# Patient Record
Sex: FEMALE | Race: WHITE | Employment: OTHER | RURAL
[De-identification: names, ages, dates, MRNs, and addresses within clinical notes are randomized per-mention and may not be internally consistent; named-entity substitution may affect disease eponyms.]

---

## 2017-02-01 ENCOUNTER — TELEPHONE (OUTPATIENT)
Dept: FAMILY MEDICINE CLINIC | Age: 77
End: 2017-02-01

## 2017-02-01 NOTE — TELEPHONE ENCOUNTER
Pt has decided to go to Urgent care in [address] [facility] for signs of UTI. She states it is closer for her anyway.

## 2017-02-03 ENCOUNTER — TELEPHONE (OUTPATIENT)
Dept: FAMILY MEDICINE CLINIC | Age: 77
End: 2017-02-03

## 2017-02-03 NOTE — TELEPHONE ENCOUNTER
UTI.  WENT TO URGENT CARE WHEN WE COULDN'T GET HER IN.  NOW SHE IS WORSE. WANTED TO COME FOR A VISIT.   [phone]

## 2017-02-03 NOTE — TELEPHONE ENCOUNTER
HEMALATHA SW patient and she stated that she was diagnosed with an UTI on Wednesday / Urgent Care and was started on Cipro. She had to stop taking the medication last night because it was making her feel so sick, but now is having back pain and a temp this morning of 99.9. I stated to her that the UTI probably has not cleared up since she did not complete the treatment and that John Gautam might have her to come in to recheck her urine or give her another medication, but will send the message to John Gautam.

## 2017-02-03 NOTE — TELEPHONE ENCOUNTER
Returned patient call    She states she is continuing with the cipro she is cutting the pill in half and taking about an hour apart. She has a week prescription. Advised her to continue with current prescription  Increase fluid intake    Call next week if symptoms do not improve.

## 2017-02-13 ENCOUNTER — OFFICE VISIT (OUTPATIENT)
Dept: FAMILY MEDICINE CLINIC | Age: 77
End: 2017-02-13

## 2017-02-13 VITALS
DIASTOLIC BLOOD PRESSURE: 80 MMHG | HEART RATE: 56 BPM | OXYGEN SATURATION: 97 % | TEMPERATURE: 97.6 F | RESPIRATION RATE: 20 BRPM | HEIGHT: 65 IN | SYSTOLIC BLOOD PRESSURE: 160 MMHG | WEIGHT: 191.2 LBS | BODY MASS INDEX: 31.86 KG/M2

## 2017-02-13 DIAGNOSIS — R73.02 GLUCOSE INTOLERANCE (IMPAIRED GLUCOSE TOLERANCE): ICD-10-CM

## 2017-02-13 DIAGNOSIS — Z78.0 POSTMENOPAUSAL: ICD-10-CM

## 2017-02-13 DIAGNOSIS — Z12.11 COLON CANCER SCREENING: ICD-10-CM

## 2017-02-13 DIAGNOSIS — Z00.00 MEDICARE ANNUAL WELLNESS VISIT, INITIAL: ICD-10-CM

## 2017-02-13 DIAGNOSIS — I10 ESSENTIAL HYPERTENSION: Primary | ICD-10-CM

## 2017-02-13 DIAGNOSIS — Z09 NEED FOR IMMUNIZATION FOLLOW-UP: ICD-10-CM

## 2017-02-13 DIAGNOSIS — Z71.89 ACP (ADVANCE CARE PLANNING): ICD-10-CM

## 2017-02-13 DIAGNOSIS — E55.9 UNSPECIFIED VITAMIN D DEFICIENCY: ICD-10-CM

## 2017-02-13 DIAGNOSIS — E78.2 MIXED HYPERLIPIDEMIA: ICD-10-CM

## 2017-02-13 RX ORDER — LOSARTAN POTASSIUM 100 MG/1
100 TABLET ORAL DAILY
Qty: 90 TAB | Refills: 0 | Status: SHIPPED | OUTPATIENT
Start: 2017-02-13 | End: 2017-05-30 | Stop reason: SDUPTHER

## 2017-02-13 RX ORDER — PRAVASTATIN SODIUM 40 MG/1
40 TABLET ORAL
COMMUNITY
End: 2017-05-01 | Stop reason: SDUPTHER

## 2017-02-13 NOTE — MR AVS SNAPSHOT
Visit Information Date & Time Provider Department Dept. Phone Encounter #  
 2/13/2017  8:00 AM Chanell White MD [facility] 74 [phone] [id number] Upcoming Health Maintenance Date Due DTaP/Tdap/Td series (1 - Tdap) 6/12/1963 FOBT Q 1 YEAR AGE 50-75 6/12/1992 ZOSTER VACCINE AGE 60> 6/12/2002 GLAUCOMA SCREENING Q2Y 6/12/2007 OSTEOPOROSIS SCREENING (DEXA) 6/12/2007 Pneumococcal 65+ Low/Medium Risk (1 of 2 - PCV13) 6/12/2007 MEDICARE YEARLY EXAM 6/12/2007 Allergies as of 2/13/2017  Review Complete On: 2/13/2017 By: Gabriella Mahajan LPN Severity Noted Reaction Type Reactions Bactrim [Sulfamethoprim Ss] High 02/13/2017   Side Effect Nausea Only, Other (comments) Blisters On  Lip Ciprofloxacin  08/24/2015    Hives Lisinopril  08/05/2013    Other (comments) Felt bad Current Immunizations  Never Reviewed Name Date Pneumococcal Conjugate (PCV-13) 2/13/2017  9:15 AM  
  
 Not reviewed this visit You Were Diagnosed With   
  
 Codes Comments Essential hypertension    -  Primary ICD-10-CM: I10 
ICD-9-CM: 401.9 Mixed hyperlipidemia     ICD-10-CM: E78.2 ICD-9-CM: 272.2 Glucose intolerance (impaired glucose tolerance)     ICD-10-CM: R73.02 
ICD-9-CM: 790.22 Unspecified vitamin D deficiency     ICD-10-CM: E55.9 ICD-9-CM: 268.9 Need for immunization follow-up     ICD-10-CM: [address] ICD-9-CM: V67.59 Postmenopausal     ICD-10-CM: Z78.0 ICD-9-CM: V49.81 Medicare annual wellness visit, initial     ICD-10-CM: Z00.00 ICD-9-CM: V70.0 Colon cancer screening     ICD-10-CM: Z12.11 ICD-9-CM: V76.51   
 ACP (advance care planning)     ICD-10-CM: Z71.89 ICD-9-CM: V65.49 Vitals BP Pulse Temp Resp Height(growth percentile) Weight(growth percentile)  160/80 (BP 1 Location: Left arm, BP Patient Position: Sitting) (!) 56 97.6 °F (36.4 °C) (Oral) 20 5' 4.5\" (1.638 m) 191 lb 3.2 oz (86.7 kg) SpO2 BMI OB Status Smoking Status 97% 32.31 kg/m2 Postmenopausal Former Smoker BMI and BSA Data Body Mass Index Body Surface Area  
 32.31 kg/m 2 1.99 m 2 Preferred Pharmacy Pharmacy Name Phone [facility] [address] [facility], [address] [facility] [phone] Your Updated Medication List  
  
   
This list is accurate as of: 2/13/17  9:21 AM.  Always use your most recent med list.  
  
  
  
  
 aspirin delayed-release 81 mg tablet Take  by mouth daily. bisoprolol-hydroCHLOROthiazide 5-6.25 mg per tablet [facility] known [facility] TA[facility] ONE TABLET BY MOUTH ONCE DAILY  
  
 clorazepate 7.5 mg tablet Commonly known as:  TRANXENE Take 0.5 Tabs by mouth nightly. Max Daily Amount: 3.75 mg. COQ10  100-100 mg-unit Cap Generic drug:  coenzyme q10-vitamin e Take 1 Tab by mouth daily. FISH OIL 1,000 mg Cap Generic drug:  omega-3 fatty acids-vitamin e Take 2 Caps by mouth daily. losartan 100 mg tablet Commonly known as:  COZAAR Take 1 Tab by mouth daily. PRAVACHOL 40 mg tablet Generic drug:  pravastatin Take 40 mg by mouth nightly. VITAMIN D3 1,000 unit Cap Generic drug:  cholecalciferol Take 2 Caps by mouth daily. Prescriptions Sent to Pharmacy Refills  
 losartan (COZAAR) 100 mg tablet 0 Sig: Take 1 Tab by mouth daily. Class: Normal  
 Pharmacy: [id number] [facility], [address] [facility] Ph #: [phone] Route: Oral  
  
We Performed the Following CBC WITH AUTOMATED DIFF [15961 CPT(R)] COLLECTION VENOUS BLOOD,VENIPUNCTURE [id number] CPT(R)] HEMOGLOBIN A1C W/O EAG [83940 CPT(R)] LIPID PANEL WITH LDL/HDL RATIO [56036 CPT(R)] METABOLIC PANEL, COMPREHENSIVE [73358 CPT(R)] PNEUMOCOCCAL CONJ VACCINE 13 VALENT IM [id number] CPT(R)] VITAMIN D, 25 HYDROXY [id number] CPT(R)] To-Do List   
 02/13/2017 Lab:  OCCULT BLOOD, IMMUNOASSAY (FIT)   
  
 02/14/2017 Imaging:  DEXA BONE DENSITY STUDY AXIAL Patient Instructions Dual-Energy X-Ray Absorptiometry (DXA) Test: About This Test 
What is it? Dual-energy X-ray absorptiometry (DXA) is a test that uses two different X-ray beams to check bone thickness (density) in your spine and hip. This information is used to estimate the strength of your bones. Why is this test done? A DXA test is done to check for bone thinning and weakness, which can make it easier for you to break a bone. It is often done for: 
· People who are at risk for osteoporosis, including: ¨ Women who are age 72 and older. ¨ Older men. ¨ People who take some medications, such as corticosteroids. ¨ People who have certain medical conditions, such as hyperparathyroidism. · People who have osteoporosis, to see how well treatment is working. What happens before the test? 
· Women who are pregnant should not have this test. Let your doctor know if you are or might be pregnant. · You may be able to leave your clothes on for the test, but you will remove any metal buttons or jun for the test. 
What happens during the test? 
· You will lie down on your back on a padded table. · You may need to lie with your legs straight or with your lower legs resting on a platform built into the table. · The machine will scan your bones and measure the amount of radiation they absorb. During this test you are exposed to a very low dose of radiation. How long does the test take? · The test will take about 20 minutes. What happens after the test? 
· You will probably be able to go home right away. · You can go back to your usual activities right away. Follow-up care is a key part of your treatment and safety.  Be sure to make and go to all appointments, and call your doctor if you are having problems. It's also a good idea to keep a list of the medicines you take. Ask your doctor when you can expect to have your test results. Where can you learn more? Go to http://ruma-robert.info/. Enter N570 in the search box to learn more about \"Dual-Energy X-Ray Absorptiometry (DXA) Test: About This Test.\" Current as of: August 4, 2016 Content Version: 11.1 © 3326-5861 [employer] Care instructions adapted under license by [employer] (which disclaims liability or warranty for this information). If you have questions about a medical condition or this instruction, always ask your healthcare professional. [address] any warranty or liability for your use of this information. Medicare Wellness Visit, Female The best way to live healthy is to have a healthy lifestyle by eating a well-balanced diet, exercising regularly, limiting alcohol and stopping smoking. Regular physical exams and screening tests are another way to keep healthy. Preventive exams provided by your health care provider can find health problems before they become diseases or illnesses. Preventive services including immunizations, screening tests, monitoring and exams can help you take care of your own health. All people over age 72 should have a pneumovax  and and a prevnar shot to prevent pneumonia. These are once in a lifetime unless you and your provider decide differently. All people over 65 should have a yearly flu shot and a tetanus vaccine every 10 years. A bone mass density to screen for osteoporosis or thinning of the bones should be done every 2 years after 65. Screening for diabetes mellitus with a blood sugar test should be done every year.  
 
Glaucoma is a disease of the eye due to increased ocular pressure that can lead to blindness and it should be done every year by an eye professional. 
 
 Cardiovascular screening tests that check for elevated lipids (fatty part of blood) which can lead to heart disease and strokes should be done every 5 years. Colorectal screening that evaluates for blood or polyps in your colon should be done yearly as a stool test or every five years as a flexible sigmoidoscope or every 10 years as a colonoscopy up to age 76. Breast cancer screening with a mammogram is recommended biennially  for women age 54-69. Screening for cervical cancer with a pap smear and pelvic exam is recommended for women after age 72 years every 2 years up to age 79 or when the provider and patient decide to stop. If there is a history of cervical abnormalities or other increased risk for cancer then the test is recommended yearly. Hepatitis C screening is also recommended for anyone born between 80 through [address]. A shingles vaccine is also recommended once in a lifetime after age 61. Your Medicare Wellness Exam is recommended annually. Here is a list of your current Health Maintenance items with a due date: 
Health Maintenance Due Topic Date Due  
 DTaP/Tdap/Td  (1 - Tdap) 06/12/1963  Stool testing for trace blood  06/12/1992  Shingles Vaccine  06/12/2002  Glaucoma Screening   06/12/2007  Bone Density Screening  06/12/2007  Pneumococcal Vaccine (1 of 2 - PCV13) 06/12/2007 [facility] Annual Well Visit  06/12/2007 Pneumococcal 13-Valent Vaccine, Diphtheria Conjugate (By injection) Pneumococcal 13-Valent Vaccine, Diphtheria Conjugate (TGW-mrb-ZNN-al 13-VAY-lent VAX-een, dif-THEER-ee-a QNO-xgq-ynsn) Prevents infections, such as pneumonia and meningitis. Brand Name(s):Prevnar 13 There may be other brand names for this medicine. When This Medicine Should Not Be Used: This vaccine is not right for everyone. You should not receive this vaccine if you had an allergic reaction to pneumococcal or diphtheria vaccine. How to Use This Medicine:  
Injectable · A nurse or other health provider will give you this medicine. This vaccine is usually given as a shot into a muscle in the thigh or upper arm. · The vaccine schedule is different for different people. ¨ Tell your doctor if your child was born prematurely. Children who were premature may need to follow a different schedule. ¨ Children under 6: This vaccine is usually given as 3 or 4 separate shots over several months. Your child's doctor will tell you how many shots are needed and when to come back for the next one. ¨ Children over 6: This vaccine is given as a single shot. If your child recently received another pneumonia vaccine, this one should be given at least 8 weeks later. ¨ Adults over 50: This vaccine is given as a single dose. · It is very important for your child to receive all of the shots for the vaccine. · Missed dose: This vaccine must be given on a fixed schedule. If your child misses a dose, call your child's doctor for another appointment. Drugs and Foods to Avoid: Ask your doctor or pharmacist before using any other medicine, including over-the-counter medicines, vitamins, and herbal products. · Some medicines can affect how this vaccine works. Tell your doctor if you are receiving a treatment or medicine that causes a weak immune system. This includes radiation treatment, steroid medicine (such as hydrocortisone, methylprednisolone, prednisolone, prednisone), or cancer medicine. Warnings While Using This Medicine: · Adults and adolescents: Tell your doctor if you are pregnant or breastfeeding. · Tell your doctor if you have a weak immune system. You may not be fully protected by this vaccine. Possible Side Effects While Using This Medicine:  
Call your doctor right away if you notice any of these side effects: · Allergic reaction: Itching or hives, swelling in your face or hands, swelling or tingling in your mouth or throat, chest tightness, trouble breathing · High fever If you notice these less serious side effects, talk with your doctor: · Crying, irritability, or fussiness · Joint or muscle pain · Mild skin rash · Pain, burning, redness, or swelling where the shot was given · Poor appetite · Sleep changes If you notice other side effects that you think are caused by this medicine, tell your doctor. Call your doctor for medical advice about side effects. You may report side effects to FDA at 3-167-HHL-3116 © 2016 [address] is for End User's use only and may not be sold, redistributed or otherwise used for commercial purposes. The above information is an  only. It is not intended as medical advice for individual conditions or treatments. Talk to your doctor, nurse or pharmacist before following any medical regimen to see if it is safe and effective for you. Introducing [facility]! Dear Delaney Diehl: 
Thank you for requesting a [employer] account. Our records indicate that you have previously registered for a [employer] account but its currently inactive. Please call our [employer] support line at [phone]. Additional Information If you have questions, please visit the Frequently Asked Questions section of the [employer] website at [email or web address] Remember, MyChart is NOT to be used for urgent needs. For medical emergencies, dial 911. Now available from your iPhone and Android! Please provide this summary of care documentation to your next provider. Your primary care clinician is listed as Teresa Torrez. If you have any questions after today's visit, please call [phone].

## 2017-02-13 NOTE — ACP (ADVANCE CARE PLANNING)
Patient does not have a ACP in place at this time. Documents given to patient along with information about documents given to patient. Will discuss with family and once documents are completed they will bring copy into the office.

## 2017-02-13 NOTE — PROGRESS NOTES
HISTORY OF PRESENT ILLNESS  Mark Jorge is a 78 y.o. female. Chief Complaint   Patient presents with    Annual Wellness Visit       HPI  HTN  BP at home 140 syst  Once 200 syst    Hyperlipidemia  Had SE with 80 mg of Pravachol  Cutting it back to 40mg and aches went away    Prediabetes  Watching diet    HM reviewed    Review of Systems   HENT: Negative for congestion. Eyes: Positive for blurred vision (occ). Respiratory: Negative for cough. Cardiovascular: Negative for chest pain. Genitourinary: Negative for frequency. Neurological: Negative for dizziness and headaches. Endo/Heme/Allergies: Negative for polydipsia. Past Medical History   Diagnosis Date    Edema     Hypercholesterolemia     Hypertension     Insomnia, unspecified     Prediabetes     Unspecified vitamin D deficiency      Past Surgical History   Procedure Laterality Date    Hx tubal ligation       Current Outpatient Prescriptions   Medication Sig Dispense Refill    pravastatin (PRAVACHOL) 40 mg tablet Take 40 mg by mouth nightly.  losartan (COZAAR) 100 mg tablet Take 1 Tab by mouth daily. 90 Tab 0    clorazepate (TRANXENE) 7.5 mg tablet Take 0.5 Tabs by mouth nightly. Max Daily Amount: 3.75 mg. 45 Tab 1    bisoprolol-hydrochlorothiazide (ZIAC) 5-6.25 mg per tablet TAKE ONE TABLET BY MOUTH ONCE DAILY 90 Tab 3    coenzyme q10-vitamin e (COQ10 ) 100-100 mg-unit cap Take 1 Tab by mouth daily.  omega-3 fatty acids-vitamin e (FISH OIL) 1,000 mg cap Take 2 Caps by mouth daily.  Cholecalciferol, Vitamin D3, (VITAMIN D3) 1,000 unit cap Take 2 Caps by mouth daily.  aspirin delayed-release 81 mg tablet Take  by mouth daily.          Allergies   Allergen Reactions    Bactrim [Sulfamethoprim Ss] Nausea Only and Other (comments)     Blisters On  Lip     Ciprofloxacin Hives    Lisinopril Other (comments)     Felt bad     Visit Vitals    /80 (BP 1 Location: Left arm, BP Patient Position: Sitting)    Pulse (!) 56    Temp 97.6 °F (36.4 °C) (Oral)    Resp 20    Ht 5' 4.5\" (1.638 m)    Wt 191 lb 3.2 oz (86.7 kg)    SpO2 97%    BMI 32.31 kg/m2       Physical Exam   Constitutional: She is oriented to person, place, and time. She appears well-developed and well-nourished. No distress. HENT:   Head: Normocephalic and atraumatic. Eyes: Conjunctivae and EOM are normal.   Cardiovascular: Normal rate, regular rhythm and normal heart sounds. Pulmonary/Chest: Effort normal and breath sounds normal.   Musculoskeletal: She exhibits no edema. Neurological: She is alert and oriented to person, place, and time. Skin: Skin is warm and dry. Psychiatric: She has a normal mood and affect. Nursing note and vitals reviewed. ASSESSMENT and PLAN    ICD-10-CM ICD-9-CM    1. Essential hypertension I10 401.9 losartan (COZAAR) 100 mg tablet      CBC WITH AUTOMATED DIFF      METABOLIC PANEL, COMPREHENSIVE   2. Mixed hyperlipidemia E78.2 272.2 LIPID PANEL WITH LDL/HDL RATIO   3. Glucose intolerance (impaired glucose tolerance) R73.02 790.22 HEMOGLOBIN A1C W/O EAG   4. Unspecified vitamin D deficiency E55.9 268.9 VITAMIN D, 25 HYDROXY   5. Need for immunization follow-up Z09 V67.59 PNEUMOCOCCAL CONJ VACCINE 13 VALENT IM   6. Postmenopausal Z78.0 V49.81 DEXA BONE DENSITY STUDY AXIAL   7. Medicare annual wellness visit, initial Z00.00 V70.0    8. Colon cancer screening Z12.11 V76.51 OCCULT BLOOD, IMMUNOASSAY (FIT)   9.  ACP (advance care planning) Z71.89 V65.49

## 2017-02-13 NOTE — PATIENT INSTRUCTIONS
Dual-Energy X-Ray Absorptiometry (DXA) Test: About This Test  What is it? Dual-energy X-ray absorptiometry (DXA) is a test that uses two different X-ray beams to check bone thickness (density) in your spine and hip. This information is used to estimate the strength of your bones. Why is this test done? A DXA test is done to check for bone thinning and weakness, which can make it easier for you to break a bone. It is often done for:  · People who are at risk for osteoporosis, including:  ¨ Women who are age 72 and older. ¨ Older men. ¨ People who take some medications, such as corticosteroids. ¨ People who have certain medical conditions, such as hyperparathyroidism. · People who have osteoporosis, to see how well treatment is working. What happens before the test?  · Women who are pregnant should not have this test. Let your doctor know if you are or might be pregnant. · You may be able to leave your clothes on for the test, but you will remove any metal buttons or jun for the test.  What happens during the test?  · You will lie down on your back on a padded table. · You may need to lie with your legs straight or with your lower legs resting on a platform built into the table. · The machine will scan your bones and measure the amount of radiation they absorb. During this test you are exposed to a very low dose of radiation. How long does the test take? · The test will take about 20 minutes. What happens after the test?  · You will probably be able to go home right away. · You can go back to your usual activities right away. Follow-up care is a key part of your treatment and safety. Be sure to make and go to all appointments, and call your doctor if you are having problems. It's also a good idea to keep a list of the medicines you take. Ask your doctor when you can expect to have your test results. Where can you learn more? Go to http://ruma-robert.info/.   Enter J524 in the search box to learn more about \"Dual-Energy X-Ray Absorptiometry (DXA) Test: About This Test.\"  Current as of: August 4, 2016  Content Version: 11.1  © 3816-1322 [employer]. Care instructions adapted under license by [employer] (which disclaims liability or warranty for this information). If you have questions about a medical condition or this instruction, always ask your healthcare professional. [place] [address] any warranty or liability for your use of this information. Medicare Wellness Visit, Female    The best way to live healthy is to have a healthy lifestyle by eating a well-balanced diet, exercising regularly, limiting alcohol and stopping smoking. Regular physical exams and screening tests are another way to keep healthy. Preventive exams provided by your health care provider can find health problems before they become diseases or illnesses. Preventive services including immunizations, screening tests, monitoring and exams can help you take care of your own health. All people over age 72 should have a pneumovax  and and a prevnar shot to prevent pneumonia. These are once in a lifetime unless you and your provider decide differently. All people over 65 should have a yearly flu shot and a tetanus vaccine every 10 years. A bone mass density to screen for osteoporosis or thinning of the bones should be done every 2 years after 65. Screening for diabetes mellitus with a blood sugar test should be done every year. Glaucoma is a disease of the eye due to increased ocular pressure that can lead to blindness and it should be done every year by an eye professional.    Cardiovascular screening tests that check for elevated lipids (fatty part of blood) which can lead to heart disease and strokes should be done every 5 years.     Colorectal screening that evaluates for blood or polyps in your colon should be done yearly as a stool test or every five years as a flexible sigmoidoscope or every 10 years as a colonoscopy up to age 76. Breast cancer screening with a mammogram is recommended biennially  for women age 54-69. Screening for cervical cancer with a pap smear and pelvic exam is recommended for women after age 72 years every 2 years up to age 79 or when the provider and patient decide to stop. If there is a history of cervical abnormalities or other increased risk for cancer then the test is recommended yearly. Hepatitis C screening is also recommended for anyone born between 80 through [address]. A shingles vaccine is also recommended once in a lifetime after age 61. Your Medicare Wellness Exam is recommended annually. Here is a list of your current Health Maintenance items with a due date:  Health Maintenance Due   Topic Date Due    DTaP/Tdap/Td  (1 - Tdap) 06/12/1963    Stool testing for trace blood  06/12/1992    Shingles Vaccine  06/12/2002    Glaucoma Screening   06/12/2007    Bone Density Screening  06/12/2007    Pneumococcal Vaccine (1 of 2 - PCV13) 06/12/2007    Annual Well Visit  06/12/2007     Pneumococcal 13-Valent Vaccine, Diphtheria Conjugate (By injection)   Pneumococcal 13-Valent Vaccine, Diphtheria Conjugate (YAG-eca-ULB-al 13-VAY-lent VAX-een, dif-THEER-ee-a EUA-cqv-govm)  Prevents infections, such as pneumonia and meningitis. Brand Name(s):Prevnar 13   There may be other brand names for this medicine. When This Medicine Should Not Be Used: This vaccine is not right for everyone. You should not receive this vaccine if you had an allergic reaction to pneumococcal or diphtheria vaccine. How to Use This Medicine:   Injectable  · A nurse or other health provider will give you this medicine. This vaccine is usually given as a shot into a muscle in the thigh or upper arm. · The vaccine schedule is different for different people. ¨ Tell your doctor if your child was born prematurely.  Children who were premature may need to follow a different schedule. ¨ Children under 6: This vaccine is usually given as 3 or 4 separate shots over several months. Your child's doctor will tell you how many shots are needed and when to come back for the next one. ¨ Children over 6: This vaccine is given as a single shot. If your child recently received another pneumonia vaccine, this one should be given at least 8 weeks later. ¨ Adults over 50: This vaccine is given as a single dose. · It is very important for your child to receive all of the shots for the vaccine. · Missed dose: This vaccine must be given on a fixed schedule. If your child misses a dose, call your child's doctor for another appointment. Drugs and Foods to Avoid:   Ask your doctor or pharmacist before using any other medicine, including over-the-counter medicines, vitamins, and herbal products. · Some medicines can affect how this vaccine works. Tell your doctor if you are receiving a treatment or medicine that causes a weak immune system. This includes radiation treatment, steroid medicine (such as hydrocortisone, methylprednisolone, prednisolone, prednisone), or cancer medicine. Warnings While Using This Medicine:   · Adults and adolescents: Tell your doctor if you are pregnant or breastfeeding. · Tell your doctor if you have a weak immune system. You may not be fully protected by this vaccine.   Possible Side Effects While Using This Medicine:   Call your doctor right away if you notice any of these side effects:  · Allergic reaction: Itching or hives, swelling in your face or hands, swelling or tingling in your mouth or throat, chest tightness, trouble breathing  · High fever  If you notice these less serious side effects, talk with your doctor:   · Crying, irritability, or fussiness  · Joint or muscle pain  · Mild skin rash  · Pain, burning, redness, or swelling where the shot was given  · Poor appetite  · Sleep changes  If you notice other side effects that you think are caused by this medicine, tell your doctor. Call your doctor for medical advice about side effects. You may report side effects to FDA at 9-069-HQJ-8106  © 2016 [address] is for End User's use only and may not be sold, redistributed or otherwise used for commercial purposes. The above information is an  only. It is not intended as medical advice for individual conditions or treatments. Talk to your doctor, nurse or pharmacist before following any medical regimen to see if it is safe and effective for you.

## 2017-02-13 NOTE — ACP (ADVANCE CARE PLANNING)
Advance Care Planning (ACP) Provider Conversation Snapshot    Date of ACP Conversation: 02/13/17  Persons included in Conversation:  patient     HO given and pt will review and RTC

## 2017-02-14 LAB
25(OH)D3+25(OH)D2 SERPL-MCNC: 37.4 NG/ML (ref 30–100)
ALBUMIN SERPL-MCNC: 4.8 G/DL (ref 3.5–4.8)
ALBUMIN/GLOB SERPL: 1.8 {RATIO} (ref 1.1–2.5)
ALP SERPL-CCNC: 70 IU/L (ref 39–117)
ALT SERPL-CCNC: 27 IU/L (ref 0–32)
AST SERPL-CCNC: 30 IU/L (ref 0–40)
BASOPHILS # BLD AUTO: 0.1 X10E3/UL (ref 0–0.2)
BASOPHILS NFR BLD AUTO: 1 %
BILIRUB SERPL-MCNC: 0.5 MG/DL (ref 0–1.2)
BUN SERPL-MCNC: 23 MG/DL (ref 8–27)
BUN/CREAT SERPL: 21 (ref 11–26)
CALCIUM SERPL-MCNC: 9.8 MG/DL (ref 8.7–10.3)
CHLORIDE SERPL-SCNC: 92 MMOL/L (ref 96–106)
CHOLEST SERPL-MCNC: 160 MG/DL (ref 100–199)
CO2 SERPL-SCNC: 21 MMOL/L (ref 18–29)
CREAT SERPL-MCNC: 1.08 MG/DL (ref 0.57–1)
EOSINOPHIL # BLD AUTO: 0.4 X10E3/UL (ref 0–0.4)
EOSINOPHIL NFR BLD AUTO: 5 %
ERYTHROCYTE [DISTWIDTH] IN BLOOD BY AUTOMATED COUNT: 13.6 % (ref 12.3–15.4)
GLOBULIN SER CALC-MCNC: 2.6 G/DL (ref 1.5–4.5)
GLUCOSE SERPL-MCNC: 90 MG/DL (ref 65–99)
HBA1C MFR BLD: 6.3 % (ref 4.8–5.6)
HCT VFR BLD AUTO: 37.2 % (ref 34–46.6)
HDLC SERPL-MCNC: 35 MG/DL
HGB BLD-MCNC: 12.5 G/DL (ref 11.1–15.9)
IMM GRANULOCYTES # BLD: 0 X10E3/UL (ref 0–0.1)
IMM GRANULOCYTES NFR BLD: 0 %
INTERPRETATION: NORMAL
LDLC SERPL CALC-MCNC: 68 MG/DL (ref 0–99)
LDLC/HDLC SERPL: 1.9 RATIO UNITS (ref 0–3.2)
LYMPHOCYTES # BLD AUTO: 2.1 X10E3/UL (ref 0.7–3.1)
LYMPHOCYTES NFR BLD AUTO: 28 %
MCH RBC QN AUTO: 30.3 PG (ref 26.6–33)
MCHC RBC AUTO-ENTMCNC: 33.6 G/DL (ref 31.5–35.7)
MCV RBC AUTO: 90 FL (ref 79–97)
MONOCYTES # BLD AUTO: 0.5 X10E3/UL (ref 0.1–0.9)
MONOCYTES NFR BLD AUTO: 7 %
NEUTROPHILS # BLD AUTO: 4.4 X10E3/UL (ref 1.4–7)
NEUTROPHILS NFR BLD AUTO: 59 %
PLATELET # BLD AUTO: 270 X10E3/UL (ref 150–379)
POTASSIUM SERPL-SCNC: 5.5 MMOL/L (ref 3.5–5.2)
PROT SERPL-MCNC: 7.4 G/DL (ref 6–8.5)
RBC # BLD AUTO: 4.13 X10E6/UL (ref 3.77–5.28)
SODIUM SERPL-SCNC: 132 MMOL/L (ref 134–144)
TRIGL SERPL-MCNC: 283 MG/DL (ref 0–149)
VLDLC SERPL CALC-MCNC: 57 MG/DL (ref 5–40)
WBC # BLD AUTO: 7.5 X10E3/UL (ref 3.4–10.8)

## 2017-02-14 NOTE — PROGRESS NOTES
Call pt, the Chol is good except the Trig are high, add on a Vitamin called Niacin 500 mg and recheck fasting in 3 mo  And increase exercise to bring the good Chol up  The CBC is normal  The kidney tests are borderline elevated  The Potassium is high, recheck Potassium asap  The liver tests are normal  The HbA1C is 6.3, still in the prediabetes range, cont low conc sweets diet and recheck in 6 mo  The Vit D is normal

## 2017-03-02 DIAGNOSIS — Z78.0 POSTMENOPAUSAL: ICD-10-CM

## 2017-03-23 LAB — HEMOCCULT STL QL IA: NEGATIVE

## 2017-05-01 RX ORDER — PRAVASTATIN SODIUM 40 MG/1
40 TABLET ORAL
Qty: 90 TAB | Refills: 0 | Status: SHIPPED | OUTPATIENT
Start: 2017-05-01 | End: 2017-07-31 | Stop reason: SDUPTHER

## 2017-05-01 NOTE — TELEPHONE ENCOUNTER
Patient seen most recently by Dr. Linda Fan  Requesting refill of pravastatin  Patient recently started on niacin by Dr. Linda Fan  She supposed to have repeat lab work this month  Refill okay ×3 months  Remind patient to get lab work

## 2017-05-30 DIAGNOSIS — I10 ESSENTIAL HYPERTENSION: ICD-10-CM

## 2017-05-30 RX ORDER — LOSARTAN POTASSIUM 100 MG/1
100 TABLET ORAL DAILY
Qty: 30 TAB | Refills: 0 | Status: SHIPPED | OUTPATIENT
Start: 2017-05-30 | End: 2017-06-19 | Stop reason: SDUPTHER

## 2017-06-19 DIAGNOSIS — I10 ESSENTIAL HYPERTENSION: ICD-10-CM

## 2017-06-19 RX ORDER — LOSARTAN POTASSIUM 100 MG/1
TABLET ORAL
Qty: 20 TAB | Refills: 0 | Status: SHIPPED | OUTPATIENT
Start: 2017-06-19 | End: 2017-07-03 | Stop reason: SDUPTHER

## 2017-06-30 NOTE — TELEPHONE ENCOUNTER
Patient was only prescribed #20 on the losartan. She was seen by Dr. Deanne Flor on February 13 with no noted F/U. Can we find out why it wasn't a 90d supply?

## 2017-07-03 DIAGNOSIS — I10 ESSENTIAL HYPERTENSION: ICD-10-CM

## 2017-07-03 RX ORDER — LOSARTAN POTASSIUM 100 MG/1
TABLET ORAL
Qty: 15 TAB | Refills: 0 | Status: SHIPPED | OUTPATIENT
Start: 2017-07-03 | End: 2017-07-31 | Stop reason: SDUPTHER

## 2017-07-03 NOTE — TELEPHONE ENCOUNTER
Called and SW patient and she stated that she normally is seen every 6 months for a check up. Her last refill was 06-, but only picked up # 20 pills with no refill and last OV 02-. I informed her that maybe it was because she needed a follow up appointment sooner then the 6 months time, but that I will check with Dr. Dennis Kingsley. She stated that she is planing to come in next month for her 6 months follow up, but if she has to come sooner, then she will and that she has been keeping a log of her BP's and they have been doing very good. Message and refill request has been routed to Dr. Dennis Kingsley.

## 2017-07-03 NOTE — TELEPHONE ENCOUNTER
Does Dr. Dianne Denney want a NV or OV for the BP check? (See last phone note asking for refill on BP medication). Patient has been keeping a log at home.

## 2017-07-12 DIAGNOSIS — F41.9 ANXIETY: ICD-10-CM

## 2017-07-12 NOTE — TELEPHONE ENCOUNTER
Mrs Sobia Paredes called to see if Aldo Tisha had sent a refill request for Clorazepate. Please send Rx to [facility] order.   Last saw Dr Esme Villa on 2/17/2017

## 2017-07-13 RX ORDER — CLORAZEPATE DIPOTASSIUM 7.5 MG/1
3.75 TABLET ORAL
Qty: 45 TAB | Refills: 1 | OUTPATIENT
Start: 2017-07-13

## 2017-07-31 ENCOUNTER — OFFICE VISIT (OUTPATIENT)
Dept: FAMILY MEDICINE CLINIC | Age: 77
End: 2017-07-31

## 2017-07-31 VITALS
TEMPERATURE: 97.2 F | DIASTOLIC BLOOD PRESSURE: 70 MMHG | WEIGHT: 188.8 LBS | OXYGEN SATURATION: 96 % | SYSTOLIC BLOOD PRESSURE: 170 MMHG | HEART RATE: 60 BPM | BODY MASS INDEX: 31.46 KG/M2 | HEIGHT: 65 IN | RESPIRATION RATE: 18 BRPM

## 2017-07-31 DIAGNOSIS — E78.2 MIXED HYPERLIPIDEMIA: ICD-10-CM

## 2017-07-31 DIAGNOSIS — I10 ESSENTIAL HYPERTENSION: Primary | ICD-10-CM

## 2017-07-31 RX ORDER — PRAVASTATIN SODIUM 40 MG/1
40 TABLET ORAL
Qty: 90 TAB | Refills: 1 | Status: SHIPPED | OUTPATIENT
Start: 2017-07-31 | End: 2017-11-20 | Stop reason: SDUPTHER

## 2017-07-31 RX ORDER — BISOPROLOL FUMARATE 5 MG/1
5 TABLET ORAL DAILY
Qty: 90 TAB | Refills: 1 | Status: SHIPPED | OUTPATIENT
Start: 2017-07-31 | End: 2017-10-23

## 2017-07-31 RX ORDER — LOSARTAN POTASSIUM 100 MG/1
TABLET ORAL
Qty: 90 TAB | Refills: 0 | Status: SHIPPED | OUTPATIENT
Start: 2017-07-31 | End: 2017-09-25 | Stop reason: SDUPTHER

## 2017-07-31 RX ORDER — HYDROCHLOROTHIAZIDE 12.5 MG/1
12.5 TABLET ORAL DAILY
Qty: 30 TAB | Refills: 0 | Status: SHIPPED | OUTPATIENT
Start: 2017-07-31 | End: 2017-09-05 | Stop reason: SDUPTHER

## 2017-07-31 NOTE — MR AVS SNAPSHOT
Visit Information Date & Time Provider Department Dept. Phone Encounter #  
 7/31/2017  1:00 PM Rory Cain MD [address] [place] [address] [phone] [id number] Follow-up Instructions Return in about 4 weeks (around 8/28/2017). Follow-up and Disposition History Upcoming Health Maintenance Date Due DTaP/Tdap/Td series (1 - Tdap) 6/12/1963 ZOSTER VACCINE AGE 60> 4/12/2002 GLAUCOMA SCREENING Q2Y 6/12/2007 INFLUENZA AGE 9 TO ADULT 8/1/2017 Pneumococcal 65+ Low/Medium Risk (2 of 2 - PPSV23) 2/13/2018 MEDICARE YEARLY EXAM 2/14/2018 Allergies as of 7/31/2017  Review Complete On: 7/31/2017 By: Miya Gil RN Severity Noted Reaction Type Reactions Bactrim [Sulfamethoprim Ss] High 02/13/2017   Side Effect Nausea Only, Other (comments) Blisters On  Lip Ciprofloxacin  08/24/2015    Hives Lisinopril  08/05/2013    Other (comments) Felt bad Current Immunizations  Never Reviewed Name Date Pneumococcal Conjugate (PCV-13) 2/13/2017  9:15 AM  
  
 Not reviewed this visit You Were Diagnosed With   
  
 Codes Comments Essential hypertension    -  Primary ICD-10-CM: I10 
ICD-9-CM: 401.9 Mixed hyperlipidemia     ICD-10-CM: E78.2 ICD-9-CM: 272.2 Vitals BP Pulse Temp Resp Height(growth percentile) 170/70 (BP 1 Location: Left arm, BP Patient Position: Sitting) 60 97.2 °F (36.2 °C) (Temporal) 18 5' 4.5\" (1.638 m) Weight(growth percentile) SpO2 BMI OB Status Smoking Status 188 lb 12.8 oz (85.6 kg) 96% 31.91 kg/m2 Postmenopausal Former Smoker Vitals History BMI and BSA Data Body Mass Index Body Surface Area  
 31.91 kg/m 2 1.97 m 2 Preferred Pharmacy Pharmacy Name [facility] [address] [facility] [address] [phone] Your Updated Medication List  
  
   
This list is accurate as of: 7/31/17  1:31 PM.  Always use your most recent med list.  
  
  
  
  
 aspirin delayed-release 81 mg tablet Take  by mouth daily. bisoprolol 5 mg tablet Commonly known as:  [employer] Take 1 Tab by mouth daily. clorazepate 7.5 mg tablet Commonly known as:  TRANXENE Take 0.5 Tabs by mouth nightly. Max Daily Amount: 3.75 mg. COQ10  100-100 mg-unit Cap Generic drug:  coenzyme q10-vitamin e Take 1 Tab by mouth daily. FISH OIL 1,000 mg Cap Generic drug:  omega-3 fatty acids-vitamin e Take 2 Caps by mouth daily. hydroCHLOROthiazide 12.5 mg tablet Commonly known as:  HYDRODIURIL Take 1 Tab by mouth daily. losartan 100 mg tablet Commonly known as:  COZAAR  
TAKE ONE TABLET BY MOUTH ONCE DAILY pravastatin 40 mg tablet Commonly known as:  PRAVACHOL Take 1 Tab by mouth nightly. VITAMIN D3 1,000 unit Cap Generic drug:  cholecalciferol Take 2 Caps by mouth daily. Prescriptions Sent to Pharmacy Refills  
 bisoprolol (ZEBETA) 5 mg tablet 1 Sig: Take 1 Tab by mouth daily. Class: Normal  
 Pharmacy: [address], [facility] Ph #: [phone] Route: Oral  
 hydroCHLOROthiazide (HYDRODIURIL) 12.5 mg tablet 0 Sig: Take 1 Tab by mouth daily. Class: Normal  
 Pharmacy: [address], [facility] Ph #: [phone] Route: Oral  
 losartan (COZAAR) 100 mg tablet 0 Sig: TAKE ONE TABLET BY MOUTH ONCE DAILY Class: Normal  
 Pharmacy: [address], [facility] Ph #: [phone]  
 pravastatin (PRAVACHOL) 40 mg tablet 1 Sig: Take 1 Tab by mouth nightly. Class: Normal  
 Pharmacy: [address] [place] [address], [address] [facility] Ph #: [phone] Route: Oral  
  
Follow-up Instructions Return in about 4 weeks (around 8/28/2017). Introducing [facility]! Dear Cynthia Gomes: Thank you for requesting a [facility] account. Our records indicate that you have previously registered for a [facility] account but its currently inactive. Please call our [employer] support line at [phone]. Additional Information If you have questions, please visit the Frequently Asked Questions section of the [employer] website at [email or web address] Remember, [facility] is NOT to be used for urgent needs. For medical emergencies, dial 911. Now available from your iPhone and Android! Please provide this summary of care documentation to your next provider. Your primary care clinician is listed as Zulma Alegria. If you have any questions after today's visit, please call [phone].

## 2017-07-31 NOTE — PROGRESS NOTES
HISTORY OF PRESENT ILLNESS  Sue Oliva is a 78 y.o. female. Chief Complaint   Patient presents with    Hypertension     f/u       HPI  BP at home 130-140's  Pulse low  No SE with Losartan    Not fasting today  Due for BW in a mo except Potassium    Potassium was high    Taking Ca and Vit D 2 a day    Needs refill on Chol med  And BP med  Not fasting today  Will RTC in 1 mo      Review of Systems   Respiratory: Negative for cough and shortness of breath. Cardiovascular: Negative for chest pain and leg swelling. Musculoskeletal: Negative for myalgias. Neurological: Negative for dizziness and headaches. Past Medical History:   Diagnosis Date    Edema     Hypercholesterolemia     Hypertension     Insomnia, unspecified     Prediabetes     Unspecified vitamin D deficiency      Current Outpatient Prescriptions   Medication Sig Dispense Refill    bisoprolol (ZEBETA) 5 mg tablet Take 1 Tab by mouth daily. 90 Tab 1    hydroCHLOROthiazide (HYDRODIURIL) 12.5 mg tablet Take 1 Tab by mouth daily. 30 Tab 0    losartan (COZAAR) 100 mg tablet TAKE ONE TABLET BY MOUTH ONCE DAILY 90 Tab 0    pravastatin (PRAVACHOL) 40 mg tablet Take 1 Tab by mouth nightly. 90 Tab 1    coenzyme q10-vitamin e (COQ10 ) 100-100 mg-unit cap Take 1 Tab by mouth daily.  omega-3 fatty acids-vitamin e (FISH OIL) 1,000 mg cap Take 2 Caps by mouth daily.  Cholecalciferol, Vitamin D3, (VITAMIN D3) 1,000 unit cap Take 2 Caps by mouth daily.  aspirin delayed-release 81 mg tablet Take  by mouth daily.  clorazepate (TRANXENE) 7.5 mg tablet Take 0.5 Tabs by mouth nightly.  Max Daily Amount: 3.75 mg. 45 Tab 1     Allergies   Allergen Reactions    Bactrim [Sulfamethoprim Ss] Nausea Only and Other (comments)     Blisters On  Lip     Ciprofloxacin Hives    Lisinopril Other (comments)     Felt bad     Visit Vitals    /70 (BP 1 Location: Left arm, BP Patient Position: Sitting)    Pulse 60    Temp 97.2 °F (36.2 °C) (Temporal)    Resp 18    Ht 5' 4.5\" (1.638 m)    Wt 188 lb 12.8 oz (85.6 kg)    SpO2 96%    BMI 31.91 kg/m2     Physical Exam   Constitutional: She is oriented to person, place, and time. She appears well-developed and well-nourished. No distress. HENT:   Head: Normocephalic and atraumatic. Eyes: Conjunctivae and EOM are normal.   Pulmonary/Chest: Effort normal.   Neurological: She is alert and oriented to person, place, and time. Nursing note and vitals reviewed. ASSESSMENT and PLAN    ICD-10-CM ICD-9-CM    1. Essential hypertension I10 401.9 bisoprolol (ZEBETA) 5 mg tablet      hydroCHLOROthiazide (HYDRODIURIL) 12.5 mg tablet      losartan (COZAAR) 100 mg tablet   2.  Mixed hyperlipidemia E78.2 272.2 pravastatin (PRAVACHOL) 40 mg tablet     Recheck BP and BW in 1 mo FASTING

## 2017-09-25 ENCOUNTER — OFFICE VISIT (OUTPATIENT)
Dept: FAMILY MEDICINE CLINIC | Age: 77
End: 2017-09-25

## 2017-09-25 VITALS
RESPIRATION RATE: 18 BRPM | DIASTOLIC BLOOD PRESSURE: 74 MMHG | SYSTOLIC BLOOD PRESSURE: 192 MMHG | WEIGHT: 183.8 LBS | HEIGHT: 65 IN | HEART RATE: 58 BPM | TEMPERATURE: 97.4 F | OXYGEN SATURATION: 98 % | BODY MASS INDEX: 30.62 KG/M2

## 2017-09-25 DIAGNOSIS — E78.2 MIXED HYPERLIPIDEMIA: Primary | ICD-10-CM

## 2017-09-25 DIAGNOSIS — I10 ESSENTIAL HYPERTENSION: ICD-10-CM

## 2017-09-25 DIAGNOSIS — N28.9 RENAL INSUFFICIENCY: ICD-10-CM

## 2017-09-25 DIAGNOSIS — E87.5 HYPERKALEMIA: ICD-10-CM

## 2017-09-25 DIAGNOSIS — R73.09 ELEVATED GLUCOSE: ICD-10-CM

## 2017-09-25 RX ORDER — LOSARTAN POTASSIUM 100 MG/1
TABLET ORAL
Qty: 90 TAB | Refills: 1 | Status: SHIPPED | OUTPATIENT
Start: 2017-09-25 | End: 2018-04-16 | Stop reason: SDUPTHER

## 2017-09-25 RX ORDER — HYDROCHLOROTHIAZIDE 25 MG/1
25 TABLET ORAL DAILY
Qty: 90 TAB | Refills: 0 | Status: SHIPPED | OUTPATIENT
Start: 2017-09-25 | End: 2017-10-23

## 2017-09-25 RX ORDER — HYDROCHLOROTHIAZIDE 12.5 MG/1
TABLET ORAL
Qty: 90 TAB | Refills: 1 | Status: SHIPPED | OUTPATIENT
Start: 2017-09-25 | End: 2017-09-25 | Stop reason: DRUGHIGH

## 2017-09-25 RX ORDER — NIACIN 500 MG/1
500 TABLET, EXTENDED RELEASE ORAL
Qty: 30 TAB | Refills: 3 | Status: SHIPPED | OUTPATIENT
Start: 2017-09-25 | End: 2018-10-11 | Stop reason: ALTCHOICE

## 2017-09-25 NOTE — PROGRESS NOTES
HISTORY OF PRESENT ILLNESS  Jaclyn Brady is a 78 y.o. female. Chief Complaint   Patient presents with    Cholesterol Problem     checkup    Hypertension       HPI  HTN  BP at home 120-130/50's  Pulse in 50's  Brought in home cuff and reading same as our cuff  Took meds this am  No Sy    Hyperlipidemia  Last Triglycerides  Did not take Niacin, got flushing with it  Fasting today  No SE with any other meds    Prediabetes  Exercising more  Watching diet    Takes nerve pill half at night  Tried without and feels depressed if not taking it    Last BW K was high    Kidney tests borderline  Not on Aleve or Advil    Review of Systems   Constitutional: Negative for weight loss. HENT:        Itchy ears   Respiratory: Negative for cough and shortness of breath. Cardiovascular: Negative for chest pain. Gastrointestinal: Negative for blood in stool. Genitourinary: Negative for hematuria. Neurological: Negative for dizziness and headaches. Past Medical History:   Diagnosis Date    Edema     Hypercholesterolemia     Hypertension     Insomnia, unspecified     Prediabetes     Unspecified vitamin D deficiency      Current Outpatient Prescriptions   Medication Sig Dispense Refill    niacin ER (NIASPAN) 500 mg tablet Take 1 Tab by mouth nightly. 30 Tab 3    losartan (COZAAR) 100 mg tablet TAKE ONE TABLET BY MOUTH ONCE DAILY 90 Tab 1    hydroCHLOROthiazide (HYDRODIURIL) 25 mg tablet Take 1 Tab by mouth daily. 90 Tab 0    bisoprolol (ZEBETA) 5 mg tablet Take 1 Tab by mouth daily. 90 Tab 1    pravastatin (PRAVACHOL) 40 mg tablet Take 1 Tab by mouth nightly. 90 Tab 1    clorazepate (TRANXENE) 7.5 mg tablet Take 0.5 Tabs by mouth nightly. Max Daily Amount: 3.75 mg. 45 Tab 1    coenzyme q10-vitamin e (COQ10 ) 100-100 mg-unit cap Take 1 Tab by mouth daily.  omega-3 fatty acids-vitamin e (FISH OIL) 1,000 mg cap Take 2 Caps by mouth daily.       Cholecalciferol, Vitamin D3, (VITAMIN D3) 1,000 unit cap Take 2 Caps by mouth daily.  aspirin delayed-release 81 mg tablet Take  by mouth daily. Allergies   Allergen Reactions    Bactrim [Sulfamethoprim Ss] Nausea Only and Other (comments)     Blisters On  Lip     Ciprofloxacin Hives    Lisinopril Other (comments)     Felt bad     Visit Vitals    /74 (BP 1 Location: Left arm, BP Patient Position: Sitting)  Comment: wrist monitor 186/85    Pulse (!) 58    Temp 97.4 °F (36.3 °C) (Temporal)    Resp 18    Ht 5' 4.5\" (1.638 m)    Wt 183 lb 12.8 oz (83.4 kg)    SpO2 98%    BMI 31.06 kg/m2       Physical Exam   Constitutional: She is oriented to person, place, and time. She appears well-developed and well-nourished. No distress. HENT:   Head: Normocephalic and atraumatic. Right Ear: External ear normal.   Left Ear: External ear normal.   Mouth/Throat: Oropharynx is clear and moist. No oropharyngeal exudate. Eyes: Conjunctivae and EOM are normal. Pupils are equal, round, and reactive to light. Cardiovascular: Normal rate, regular rhythm and normal heart sounds. Pulmonary/Chest: Effort normal and breath sounds normal.   Musculoskeletal: She exhibits no edema. Lymphadenopathy:     She has no cervical adenopathy. Neurological: She is alert and oriented to person, place, and time. Skin: Skin is warm and dry. Psychiatric: She has a normal mood and affect. Nursing note and vitals reviewed. BP with own cuff 20 points lower    ASSESSMENT and PLAN    ICD-10-CM ICD-9-CM    1. Mixed hyperlipidemia E78.2 272.2 LIPID PANEL WITH LDL/HDL RATIO      niacin ER (NIASPAN) 500 mg tablet   2. Essential hypertension J95 402.7 METABOLIC PANEL, COMPREHENSIVE      losartan (COZAAR) 100 mg tablet      DISCONTINUED: hydroCHLOROthiazide (HYDRODIURIL) 12.5 mg tablet  Increased HCTZ to 25 mg  Recheck BP in 2 w   3. Elevated glucose R73.09 790.29 HEMOGLOBIN A1C W/O EAG   4. Hyperkalemia E87.5 276.7    5.  Renal insufficiency N28.9 593.9    recheck BP in 2 w

## 2017-09-25 NOTE — MR AVS SNAPSHOT
Visit Information Date & Time Provider Department Dept. Phone Encounter #  
 9/25/2017  8:00 AM Adams Talamantes MD [address] [place] [address] [phone] [id number] Follow-up Instructions Return in about 6 months (around 3/25/2018). Upcoming Health Maintenance Date Due DTaP/Tdap/Td series (1 - Tdap) 6/12/1963 ZOSTER VACCINE AGE 60> 4/12/2002 GLAUCOMA SCREENING Q2Y 6/12/2007 Pneumococcal 65+ Low/Medium Risk (2 of 2 - PPSV23) 2/13/2018 MEDICARE YEARLY EXAM 2/14/2018 Allergies as of 9/25/2017  Review Complete On: 9/25/2017 By: Peace Michele RN Severity Noted Reaction Type Reactions Bactrim [Sulfamethoprim Ss] High 02/13/2017   Side Effect Nausea Only, Other (comments) Blisters On  Lip Ciprofloxacin  08/24/2015    Hives Lisinopril  08/05/2013    Other (comments) Felt bad Current Immunizations  Never Reviewed Name Date Pneumococcal Conjugate (PCV-13) 2/13/2017  9:15 AM  
  
 Not reviewed this visit You Were Diagnosed With   
  
 Codes Comments Mixed hyperlipidemia    -  Primary ICD-10-CM: Q39.2 ICD-9-CM: 272.2 Essential hypertension     ICD-10-CM: I10 
ICD-9-CM: 401.9 Elevated glucose     ICD-10-CM: R73.09 
ICD-9-CM: 790.29 Hyperkalemia     ICD-10-CM: E87.5 ICD-9-CM: 276.7 Renal insufficiency     ICD-10-CM: N28.9 ICD-9-CM: 593.9 Vitals BP Pulse Temp Resp Height(growth percentile) 192/74 (BP 1 Location: Left arm, BP Patient Position: Sitting) (!) 58 97.4 °F (36.3 °C) (Temporal) 18 5' 4.5\" (1.638 m) Weight(growth percentile) SpO2 BMI OB Status Smoking Status 183 lb 12.8 oz (83.4 kg) 98% 31.06 kg/m2 Postmenopausal Former Smoker Vitals History BMI and BSA Data Body Mass Index Body Surface Area 31.06 kg/m 2 1.95 m 2 Preferred Pharmacy Pharmacy Name Phone [facility] [address] [facility], [facility] [address] [facility] [phone] Your Updated Medication List  
  
   
This list is accurate as of: 9/25/17  9:32 AM.  Always use your most recent med list.  
  
  
  
  
 aspirin delayed-release 81 mg tablet Take  by mouth daily. bisoprolol 5 mg tablet Commonly known as:  Aliza Bowling Take 1 Tab by mouth daily. clorazepate 7.5 mg tablet Commonly known as:  TRANXENE Take 0.5 Tabs by mouth nightly. Max Daily Amount: 3.75 mg. COQ10  100-100 mg-unit Cap Generic drug:  coenzyme q10-vitamin e Take 1 Tab by mouth daily. FISH OIL 1,000 mg Cap Generic drug:  omega-3 fatty acids-vitamin e Take 2 Caps by mouth daily. hydroCHLOROthiazide 25 mg tablet Commonly known as:  HYDRODIURIL Take 1 Tab by mouth daily. losartan 100 mg tablet Commonly known as:  COZAAR  
TAKE ONE TABLET BY MOUTH ONCE DAILY  
  
 niacin  mg tablet Commonly known as:  Nicholas Bacon Take 1 Tab by mouth nightly. pravastatin 40 mg tablet Commonly known as:  PRAVACHOL Take 1 Tab by mouth nightly. VITAMIN D3 1,000 unit Cap Generic drug:  cholecalciferol Take 2 Caps by mouth daily. Prescriptions Sent to Pharmacy Refills  
 niacin ER (NIASPAN) 500 mg tablet 3 Sig: Take 1 Tab by mouth nightly. Class: Normal  
 Pharmacy: [facility] [facility], [facility] [address] [facility] Ph #: [phone] Route: Oral  
 losartan (COZAAR) 100 mg tablet 1 Sig: TAKE ONE TABLET BY MOUTH ONCE DAILY Class: Normal  
 Pharmacy: [facility] [facility], [facility] [address] [facility] Ph #: [phone]  
 hydroCHLOROthiazide (HYDRODIURIL) 25 mg tablet 0 Sig: Take 1 Tab by mouth daily. Class: Normal  
 Pharmacy: [facility] [facility], [facility] [address] [facility] Ph #: [phone] Route: Oral  
  
We Performed the Following HEMOGLOBIN A1C W/O EAG [99655 CPT(R)] LIPID PANEL WITH LDL/HDL RATIO [41205 CPT(R)] METABOLIC PANEL, COMPREHENSIVE [37651 CPT(R)] Follow-up Instructions Return in about 6 months (around 3/25/2018). Introducing [facility]! Dear [facility]: 
Thank you for requesting a [employer] account. Our records indicate that you have previously registered for a [employer] account but its currently inactive. Please call our [employer] support line at [phone]. Additional Information If you have questions, please visit the Frequently Asked Questions section of the [employer] website at [email or web address] Remember, [employer] is NOT to be used for urgent needs. For medical emergencies, dial 911. Now available from your iPhone and Android! Please provide this summary of care documentation to your next provider. Your primary care clinician is listed as Ignacio Burrows. If you have any questions after today's visit, please call [phone].

## 2017-09-26 LAB
ALBUMIN SERPL-MCNC: 4.9 G/DL (ref 3.5–4.8)
ALBUMIN/GLOB SERPL: 1.9 {RATIO} (ref 1.2–2.2)
ALP SERPL-CCNC: 56 IU/L (ref 39–117)
ALT SERPL-CCNC: 35 IU/L (ref 0–32)
AST SERPL-CCNC: 34 IU/L (ref 0–40)
BILIRUB SERPL-MCNC: 0.6 MG/DL (ref 0–1.2)
BUN SERPL-MCNC: 21 MG/DL (ref 8–27)
BUN/CREAT SERPL: 19 (ref 12–28)
CALCIUM SERPL-MCNC: 9.8 MG/DL (ref 8.7–10.3)
CHLORIDE SERPL-SCNC: 88 MMOL/L (ref 96–106)
CHOLEST SERPL-MCNC: 150 MG/DL (ref 100–199)
CO2 SERPL-SCNC: 24 MMOL/L (ref 18–29)
CREAT SERPL-MCNC: 1.12 MG/DL (ref 0.57–1)
GLOBULIN SER CALC-MCNC: 2.6 G/DL (ref 1.5–4.5)
GLUCOSE SERPL-MCNC: 97 MG/DL (ref 65–99)
HBA1C MFR BLD: 5.9 % (ref 4.8–5.6)
HDLC SERPL-MCNC: 39 MG/DL
INTERPRETATION: NORMAL
LDLC SERPL CALC-MCNC: 73 MG/DL (ref 0–99)
LDLC/HDLC SERPL: 1.9 RATIO UNITS (ref 0–3.2)
POTASSIUM SERPL-SCNC: 5.3 MMOL/L (ref 3.5–5.2)
PROT SERPL-MCNC: 7.5 G/DL (ref 6–8.5)
SODIUM SERPL-SCNC: 128 MMOL/L (ref 134–144)
TRIGL SERPL-MCNC: 189 MG/DL (ref 0–149)
VLDLC SERPL CALC-MCNC: 38 MG/DL (ref 5–40)

## 2017-09-27 DIAGNOSIS — E87.8 ELECTROLYTE ABNORMALITY: Primary | ICD-10-CM

## 2017-09-27 NOTE — PROGRESS NOTES
Call pt, the HbA1C is 5.9, better than 6 mo ago, still in the prediabetes range, cont low conc sweets diet and exercises and weight loss  The Chol is better, still the Triglycerides are a little high, try the Niacin and recheck in 3-6 mo  The kidney tests are stable  The electrolytes are low, limit fluids to 64 oz a day and recheck a CMP in 2 w, I have placed a future order

## 2017-10-09 ENCOUNTER — OFFICE VISIT (OUTPATIENT)
Dept: FAMILY MEDICINE CLINIC | Age: 77
End: 2017-10-09

## 2017-10-09 VITALS
WEIGHT: 184.8 LBS | DIASTOLIC BLOOD PRESSURE: 70 MMHG | HEIGHT: 65 IN | BODY MASS INDEX: 30.79 KG/M2 | RESPIRATION RATE: 18 BRPM | TEMPERATURE: 98 F | SYSTOLIC BLOOD PRESSURE: 170 MMHG | HEART RATE: 56 BPM | OXYGEN SATURATION: 97 %

## 2017-10-09 DIAGNOSIS — I10 ESSENTIAL HYPERTENSION: Primary | ICD-10-CM

## 2017-10-09 DIAGNOSIS — G47.00 INSOMNIA, UNSPECIFIED TYPE: ICD-10-CM

## 2017-10-09 RX ORDER — CLORAZEPATE DIPOTASSIUM 3.75 MG/1
TABLET ORAL
Qty: 45 TAB | Refills: 0 | Status: SHIPPED | OUTPATIENT
Start: 2017-10-09 | End: 2017-12-21 | Stop reason: SDUPTHER

## 2017-10-09 NOTE — PROGRESS NOTES
HISTORY OF PRESENT ILLNESS  Jason Bethea is a 78 y.o. female. Chief Complaint   Patient presents with    Hypertension     f/u       HPI  HTN  -381 systolic at home  One morning felt bad and syst BP was 103     Bought new machine and brought it in  BP with that 168/62  Here with ours 174/64    Tried to drink more water  Here for recheck of CMP    Needs refill of Clorazepate  Taking it for burning in mouth and sleep  Taking half of 7.5 mg  Has tried to taper down      Review of Systems   Respiratory: Positive for cough (occ dry cough). Negative for shortness of breath. Cardiovascular: Negative for chest pain. Neurological: Negative for dizziness and headaches. Past Medical History:   Diagnosis Date    Edema     Hypercholesterolemia     Hypertension     Insomnia, unspecified     Prediabetes     Unspecified vitamin D deficiency      Current Outpatient Prescriptions   Medication Sig Dispense Refill    clorazepate (TRANXENE) 3.75 mg tablet Take half to one at night 45 Tab 0    niacin ER (NIASPAN) 500 mg tablet Take 1 Tab by mouth nightly. 30 Tab 3    losartan (COZAAR) 100 mg tablet TAKE ONE TABLET BY MOUTH ONCE DAILY 90 Tab 1    hydroCHLOROthiazide (HYDRODIURIL) 25 mg tablet Take 1 Tab by mouth daily. 90 Tab 0    bisoprolol (ZEBETA) 5 mg tablet Take 1 Tab by mouth daily. 90 Tab 1    pravastatin (PRAVACHOL) 40 mg tablet Take 1 Tab by mouth nightly. 90 Tab 1    coenzyme q10-vitamin e (COQ10 ) 100-100 mg-unit cap Take 1 Tab by mouth daily.  omega-3 fatty acids-vitamin e (FISH OIL) 1,000 mg cap Take 2 Caps by mouth daily.  Cholecalciferol, Vitamin D3, (VITAMIN D3) 1,000 unit cap Take 2 Caps by mouth daily.  aspirin delayed-release 81 mg tablet Take  by mouth daily.        Allergies   Allergen Reactions    Bactrim [Sulfamethoprim Ss] Nausea Only and Other (comments)     Blisters On  Lip     Ciprofloxacin Hives    Lisinopril Other (comments)     Felt bad     Visit Vitals  /70 (BP 1 Location: Left arm, BP Patient Position: Sitting)    Pulse (!) 56    Temp 98 °F (36.7 °C) (Temporal)    Resp 18    Ht 5' 4.5\" (1.638 m)    Wt 184 lb 12.8 oz (83.8 kg)    SpO2 97%    BMI 31.23 kg/m2     Physical Exam   Constitutional: She is oriented to person, place, and time. She appears well-developed and well-nourished. No distress. HENT:   Head: Normocephalic and atraumatic. Eyes: Conjunctivae and EOM are normal.   Cardiovascular: Normal rate and regular rhythm. Pulmonary/Chest: Effort normal and breath sounds normal.   Musculoskeletal: She exhibits no edema. Neurological: She is alert and oriented to person, place, and time. Skin: Skin is warm and dry. Psychiatric: She has a normal mood and affect. Nursing note and vitals reviewed. ASSESSMENT and PLAN    ICD-10-CM ICD-9-CM    1. Essential hypertension G99 961.6 METABOLIC PANEL, COMPREHENSIVE   2.  Insomnia, unspecified type G47.00 780.52 clorazepate (TRANXENE) 3.75 mg tablet   monitor BP at home   RTC if a lot over 140/90    Cont to try to taper Clorazepate to taking half of 3.75 prn

## 2017-10-09 NOTE — MR AVS SNAPSHOT
Visit Information Date & Time Provider Department Dept. Phone Encounter #  
 10/9/2017 10:00 AM Treasure Bullock MD [address] [place] [address] [phone] [id number] Upcoming Health Maintenance Date Due DTaP/Tdap/Td series (1 - Tdap) 6/12/1963 ZOSTER VACCINE AGE 60> 4/12/2002 GLAUCOMA SCREENING Q2Y 6/12/2007 Pneumococcal 65+ Low/Medium Risk (2 of 2 - PPSV23) 2/13/2018 MEDICARE YEARLY EXAM 2/14/2018 Allergies as of 10/9/2017  Review Complete On: 10/9/2017 By: Prema Wellington RN Severity Noted Reaction Type Reactions Bactrim [Sulfamethoprim Ss] High 02/13/2017   Side Effect Nausea Only, Other (comments) Blisters On  Lip Ciprofloxacin  08/24/2015    Hives Lisinopril  08/05/2013    Other (comments) Felt bad Current Immunizations  Never Reviewed Name Date Pneumococcal Conjugate (PCV-13) 2/13/2017  9:15 AM  
  
 Not reviewed this visit You Were Diagnosed With   
  
 Codes Comments Essential hypertension    -  Primary ICD-10-CM: I10 
ICD-9-CM: 401.9 Insomnia, unspecified type     ICD-10-CM: G47.00 ICD-9-CM: 780.52 Vitals BP Pulse Temp Resp Height(growth percentile) Weight(growth percentile) 170/70 (BP 1 Location: Left arm, BP Patient Position: Sitting) (!) 56 98 °F (36.7 °C) (Temporal) 18 5' 4.5\" (1.638 m) 184 lb 12.8 oz (83.8 kg) SpO2 BMI OB Status Smoking Status 97% 31.23 kg/m2 Postmenopausal Former Smoker BMI and BSA Data Body Mass Index Body Surface Area  
 31.23 kg/m 2 1.95 m 2 Preferred Pharmacy Pharmacy Name Phone [facility] [address] & [facility] [phone] Your Updated Medication List  
  
   
This list is accurate as of: 10/9/17 10:31 AM.  Always use your most recent med list.  
  
  
  
  
 aspirin delayed-release 81 mg tablet Take  by mouth daily. bisoprolol 5 mg tablet Commonly known as:  Randy Hernández Take 1 Tab by mouth daily. clorazepate 3.75 mg tablet Commonly known as:  TRANXENE Take half to one at night  
  
 COQ10  100-100 mg-unit Cap Generic drug:  coenzyme q10-vitamin e Take 1 Tab by mouth daily. FISH OIL 1,000 mg Cap Generic drug:  omega-3 fatty acids-vitamin e Take 2 Caps by mouth daily. hydroCHLOROthiazide 25 mg tablet Commonly known as:  HYDRODIURIL Take 1 Tab by mouth daily. losartan 100 mg tablet Commonly known as:  COZAAR  
TAKE ONE TABLET BY MOUTH ONCE DAILY  
  
 niacin  mg tablet Commonly known as:  Nicole Brayden Take 1 Tab by mouth nightly. pravastatin 40 mg tablet Commonly known as:  PRAVACHOL Take 1 Tab by mouth nightly. VITAMIN D3 1,000 unit Cap Generic drug:  cholecalciferol Take 2 Caps by mouth daily. Prescriptions Printed Refills  
 clorazepate (TRANXENE) 3.75 mg tablet 0 Sig: Take half to one at night Class: Print We Performed the Following METABOLIC PANEL, COMPREHENSIVE [24219 CPT(R)] Introducing [facility]! Dear Thania Arana: 
Thank you for requesting a [employer] account. Our records indicate that you have previously registered for a [employer] account but its currently inactive. Please call our [employer] support line at [phone]. Additional Information If you have questions, please visit the Frequently Asked Questions section of the [employer] website at [email or web address] Remember, [employer] is NOT to be used for urgent needs. For medical emergencies, dial 911. Now available from your iPhone and Android! Please provide this summary of care documentation to your next provider. Your primary care clinician is listed as Radha Day. If you have any questions after today's visit, please call [phone].

## 2017-10-10 LAB
ALBUMIN SERPL-MCNC: 4.7 G/DL (ref 3.5–4.8)
ALBUMIN/GLOB SERPL: 1.9 {RATIO} (ref 1.2–2.2)
ALP SERPL-CCNC: 55 IU/L (ref 39–117)
ALT SERPL-CCNC: 30 IU/L (ref 0–32)
AST SERPL-CCNC: 29 IU/L (ref 0–40)
BILIRUB SERPL-MCNC: 0.4 MG/DL (ref 0–1.2)
BUN SERPL-MCNC: 22 MG/DL (ref 8–27)
BUN/CREAT SERPL: 20 (ref 12–28)
CALCIUM SERPL-MCNC: 9.7 MG/DL (ref 8.7–10.3)
CHLORIDE SERPL-SCNC: 87 MMOL/L (ref 96–106)
CO2 SERPL-SCNC: 25 MMOL/L (ref 18–29)
CREAT SERPL-MCNC: 1.1 MG/DL (ref 0.57–1)
GLOBULIN SER CALC-MCNC: 2.5 G/DL (ref 1.5–4.5)
GLUCOSE SERPL-MCNC: 116 MG/DL (ref 65–99)
INTERPRETATION: NORMAL
POTASSIUM SERPL-SCNC: 4.9 MMOL/L (ref 3.5–5.2)
PROT SERPL-MCNC: 7.2 G/DL (ref 6–8.5)
SODIUM SERPL-SCNC: 125 MMOL/L (ref 134–144)

## 2017-10-10 NOTE — PROGRESS NOTES
Call pt, the sugar is a little up, avoid conc sweets  The kidney tests are stable  The electrolytes are low, limit fluids to 64 oz a day  The liver tests are normal

## 2017-10-23 ENCOUNTER — OFFICE VISIT (OUTPATIENT)
Dept: FAMILY MEDICINE CLINIC | Age: 77
End: 2017-10-23

## 2017-10-23 VITALS
WEIGHT: 182.6 LBS | HEIGHT: 65 IN | OXYGEN SATURATION: 97 % | TEMPERATURE: 97.8 F | BODY MASS INDEX: 30.42 KG/M2 | SYSTOLIC BLOOD PRESSURE: 173 MMHG | RESPIRATION RATE: 16 BRPM | HEART RATE: 76 BPM | DIASTOLIC BLOOD PRESSURE: 57 MMHG

## 2017-10-23 DIAGNOSIS — I10 ESSENTIAL HYPERTENSION: Primary | ICD-10-CM

## 2017-10-23 DIAGNOSIS — K21.9 GASTROESOPHAGEAL REFLUX DISEASE WITHOUT ESOPHAGITIS: ICD-10-CM

## 2017-10-23 DIAGNOSIS — G89.29 CHRONIC BILATERAL THORACIC BACK PAIN: ICD-10-CM

## 2017-10-23 DIAGNOSIS — M54.6 CHRONIC BILATERAL THORACIC BACK PAIN: ICD-10-CM

## 2017-10-23 RX ORDER — BISOPROLOL FUMARATE AND HYDROCHLOROTHIAZIDE 5; 6.25 MG/1; MG/1
TABLET ORAL
Qty: 90 TAB | Refills: 3 | OUTPATIENT
Start: 2017-10-23

## 2017-10-23 RX ORDER — PHENOL/SODIUM PHENOLATE
20 AEROSOL, SPRAY (ML) MUCOUS MEMBRANE DAILY
Qty: 30 TAB | Refills: 0 | Status: SHIPPED | OUTPATIENT
Start: 2017-10-23 | End: 2017-11-20 | Stop reason: SDUPTHER

## 2017-10-23 RX ORDER — AMLODIPINE BESYLATE 2.5 MG/1
2.5 TABLET ORAL DAILY
Qty: 30 TAB | Refills: 0 | Status: SHIPPED | OUTPATIENT
Start: 2017-10-23 | End: 2017-11-20 | Stop reason: SDUPTHER

## 2017-10-23 RX ORDER — BISOPROLOL FUMARATE AND HYDROCHLOROTHIAZIDE 5; 6.25 MG/1; MG/1
1 TABLET ORAL DAILY
Qty: 30 TAB | Refills: 0 | Status: SHIPPED | OUTPATIENT
Start: 2017-10-23 | End: 2017-11-20 | Stop reason: SDUPTHER

## 2017-10-23 NOTE — TELEPHONE ENCOUNTER
Mrs Jewel Hernández could not take the new medication Dr. Portia Pedroza put her on. She went back on her old medication(Bisoprolol HCTZ). Her BP is doing fine. Needs new RX for Bisoprolol sent to [facility] [address] [facility].

## 2017-10-23 NOTE — PATIENT INSTRUCTIONS
Therapeutic Ball: Back Exercises  Your Care Instructions  Here are some examples of typical exercises for your condition. Start each exercise slowly. Ease off the exercise if you start to have pain. Your doctor or physical therapist will tell you when you can start these exercises and which ones will work best for you. To prepare, make sure that your ball is the right size for you. When inflated and firm, it should allow you to sit with your hips and knees bent at about a 90-degree angle (like the letter L). How to do the exercises  Seated position on ball    Use this exercise to get used to moving on the ball and to find your best sitting position. 1. Sit comfortably on the ball with your feet about hip-width apart. If you feel unsteady, rest your hands on the ball near your hips. 2. As you do this exercise, try to keep your shoulders and upper body relaxed and still. 3. Using your stomach and back muscles to move your pelvis, roll the ball forward. This will round your back. 4. Still using your stomach and back muscles, roll the ball back. You will arch your back. 5. Repeat this rounding-arching motion a few times. 6. Stop in between the two positions, where your back is not rounded or arched. This is called your neutral position. Pelvic rotation    1. Sit tall on the ball. 2. Slowly rotate your hips in a Apache pattern. Keep the movement focused at your hips. 3. Repeat, but Apache in the other direction. 4. Repeat 8 to 12 times. Postural sitting    Use this position to find a stable, relaxed posture on the ball. You can use this position as your starting point for other ball exercises. If you feel unsteady on the ball, start on a chair first.  1. Sit on a ball or chair, with your feet planted straight in front of you. 2. Imagine that a string at the top of your head is pulling you straight up. Think of yourself as 2 inches taller than you are.  3. Slightly tuck your chin.   4. Keep your shoulders back and relaxed. Knee extension    1. Sit tall on the ball with your feet planted in front of you, hip-width apart. As you do this exercise, avoid slumping your shoulders and arching your back. 2. Rest your hands on the ball near your hip or a steady object next to you. (If you feel very stable on the ball, rest your hands in your lap or at your side.)  3. Slowly straighten one leg at the knee. Slowly lower it back down. Repeat with the other leg. 4. Repeat this exercise 8 to 12 times. Roll-ups    1. Lie on your back with your knees bent, feet resting on the floor. 2. Lay the ball on your thighs. Rest your hands up high on the ball. 3. Raising your head and shoulder blades, roll the ball up your thighs. Exhale as you roll up. 4. If this is hard on your neck, gently support your lower head and upper neck with one hand. Don't use that hand to pull your head up. 5. Repeat 8 to 12 times. Ball curls    1. Lie on your back with your ankles resting on the ball, knees straight. 2. Use your legs to roll the exercise ball toward you. Allow your knees to bend and move closer to your chest.  3. Pause briefly, and then roll the ball to the starting position. Try to keep the ball rolling straight. You will feel the muscles in your lower belly working. 4. Repeat 8 to 12 times. Bridge with ball under legs    1. Lie on your back with your legs up, calves resting on the ball. For more challenge, rest your heels on the ball. 2. Look up at the ceiling, and keep your chin relaxed. You can place a small pillow under your head or neck for comfort. 3. With your arms by your side, press your hands onto the floor for stability. 4. Tighten your belly muscles by pulling in your belly button toward your spine. 5. Push your heels down toward the floor, squeeze your buttocks, and lift your hips off the floor until your shoulders, hips, and knees are all in a straight line. 6. Try to keep the ball steady.  Hold for about 6 seconds as you continue to breathe normally. 7. Slowly lower your hips back down to the floor. 8. Repeat 8 to 12 times. Ball curls with bridge    1. Start flat on your back with your ankles resting on the ball. 2. Look up at the ceiling, and keep your chin relaxed. You can place a small pillow under your head or neck for comfort. 3. With your arms by your side, press your hands onto the floor for stability. 4. Tighten your belly muscles by pulling in your belly button toward your spine. 5. Push your heels down toward the floor, squeeze your buttocks, and lift your hips off the floor until your shoulders, hips, and knees are all in a straight line. 6. While holding the bridge position, roll the ball toward you with your heels. Keep your hips as level as you can. 7. Pause briefly, and then roll the ball back out. Try to keep the ball rolling straight. You will feel the muscles in your lower belly working as you straighten your legs. 8. Lower your hips, and return to your starting position. 9. Repeat 8 to 12 times. When you can keep your body and the ball steady throughout this exercise, you're ready for more challenge. Try keeping your hips raised while rolling the ball out, holding the bridge, and rolling back, a few times in a row. Praying veda    1. Kneel upright with the ball in front of you. 2. To start, clasp your hands together. Rest them on the ball in front of you. 3. As you do this exercise, keep your back and hips straight and tighten your belly and buttocks muscles. Keep your knees in place. 4. Press on the ball with your arms. Lean forward from the knees. This rolls the ball forward. You will bear most of your weight on your arms. 5. If your back starts to ache, you've gone too far. Pull back a bit. 6. Roll back to the start position. 7. Repeat 8 to 12 times. Walk-out plank on ball    1. Kneel over the ball. Place your hands on the floor in front of you.   2. Walk your hands forward until your legs are straight on the ball. This is the plank position. 3. When in plank position, hold your body straight and tighten your belly and buttocks muscles. Keep your chin slightly tucked. 4. Roll as far forward as you can without losing your balance or letting your hips drop. You may stop with the ball under your thighs, or even under your knees or shins. 5. Hold a few seconds, then walk your hands back and return to the start position. 6. Repeat 8 to 12 times. Push-up with thighs on ball    1. Kneel over the ball. Place your hands on the floor in front of you. 2. Walk your hands forward until your legs are straight on the ball. This is the plank position. 3. When in plank position, hold your body straight and tighten your belly and buttocks muscles. Keep your chin slightly tucked. 4. Roll as far forward as you can without losing your balance or letting your hips drop. You may stop with the ball under your thighs, or even under your knees or shins. 5. Bend your elbows. Slowly lower your body toward the ground as far as you can without losing your balance. 6. If your wrists hurt, try moving your hands a little farther apart so they're not right under your shoulders. 7. Slowly straighten your arms. 8. Do 8 to 12 of these push-ups. Wall squat with ball    1. Stand facing away from a wall. Place your feet about shoulder-width apart. 2. Place the ball between your middle back and the wall. Move your feet out in front of you so they are about a foot in front of your hips. 3. Keep your arms at your sides, or put your hands on your hips. 4. Slowly squat down as if you are going to sit in a chair, rolling your back over the ball as you squat. The ball should move with you but stay pressed into the wall. 5. Be sure that your knees do not go in front of your toes as you squat. 6. Hold for 6 seconds. 7. Slowly rise to your standing position. 8. Repeat 8 to 12 times.   Child's pose with ball    1. Kneeling upright with your back straight, rest your hands on the ball in front of you. 2. Breathe out as you bend at the hips, and roll the ball forward. Lower your chest toward the ground, and drop your hips back toward your heels. 3. To stretch your upper back and shoulders, hold this position for 15 to 30 seconds. 4. Repeat 2 to 4 times. Follow-up care is a key part of your treatment and safety. Be sure to make and go to all appointments, and call your doctor if you are having problems. It's also a good idea to know your test results and keep a list of the medicines you take. Where can you learn more? Go to http://ruma-robert.info/. Enter T177 in the search box to learn more about \"Therapeutic Ball: Back Exercises. \"  Current as of: March 21, 2017  Content Version: 11.3  © 8568-6013 [employer]. Care instructions adapted under license by [employer] (which disclaims liability or warranty for this information). If you have questions about a medical condition or this instruction, always ask your healthcare professional. [place] [address] any warranty or liability for your use of this information. Gastroesophageal Reflux Disease (GERD): Care Instructions  Your Care Instructions    Gastroesophageal reflux disease (GERD) is the backward flow of stomach acid into the esophagus. The esophagus is the tube that leads from your throat to your stomach. A one-way valve prevents the stomach acid from moving up into this tube. When you have GERD, this valve does not close tightly enough. If you have mild GERD symptoms including heartburn, you may be able to control the problem with antacids or over-the-counter medicine. Changing your diet, losing weight, and making other lifestyle changes can also help reduce symptoms. Follow-up care is a key part of your treatment and safety.  Be sure to make and go to all appointments, and call your doctor if you are having problems. Its also a good idea to know your test results and keep a list of the medicines you take. How can you care for yourself at home? · Take your medicines exactly as prescribed. Call your doctor if you think you are having a problem with your medicine. · Your doctor may recommend over-the-counter medicine. For mild or occasional indigestion, antacids, such as Tums, Gaviscon, Mylanta, or Maalox, may help. Your doctor also may recommend over-the-counter acid reducers, such as Pepcid AC, Tagamet HB, Zantac 75, or Prilosec. Read and follow all instructions on the label. If you use these medicines often, talk with your doctor. · Change your eating habits. ¨ Its best to eat several small meals instead of two or three large meals. ¨ After you eat, wait 2 to 3 hours before you lie down. ¨ Chocolate, mint, and alcohol can make GERD worse. ¨ Spicy foods, foods that have a lot of acid (like tomatoes and oranges), and coffee can make GERD symptoms worse in some people. If your symptoms are worse after you eat a certain food, you may want to stop eating that food to see if your symptoms get better. · Do not smoke or chew tobacco. Smoking can make GERD worse. If you need help quitting, talk to your doctor about stop-smoking programs and medicines. These can increase your chances of quitting for good. · If you have GERD symptoms at night, raise the head of your bed 6 to 8 inches by putting the frame on blocks or placing a foam wedge under the head of your mattress. (Adding extra pillows does not work.)  · Do not wear tight clothing around your middle. · Lose weight if you need to. Losing just 5 to 10 pounds can help. When should you call for help? Call your doctor now or seek immediate medical care if:  · You have new or different belly pain. · Your stools are black and tarlike or have streaks of blood.   Watch closely for changes in your health, and be sure to contact your doctor if:  · Your symptoms have not improved after 2 days. · Food seems to catch in your throat or chest.  Where can you learn more? Go to http://ruma-robert.info/. Enter R514 in the search box to learn more about \"Gastroesophageal Reflux Disease (GERD): Care Instructions. \"  Current as of: August 9, 2016  Content Version: 11.3  © 6801-5684 [employer]. Care instructions adapted under license by [employer] (which disclaims liability or warranty for this information). If you have questions about a medical condition or this instruction, always ask your healthcare professional. [place] [address] any warranty or liability for your use of this information.

## 2017-10-23 NOTE — PROGRESS NOTES
HISTORY OF PRESENT ILLNESS  Larry Live is a 78 y.o. female. Chief Complaint   Patient presents with    Hypertension    Follow-up       HPI  HTN  Was on Ziac and did well    When on Bisoprolol 5 and HCTZ 25  did not feel well  Less energy and jittery inside and diarrhea    Would like to go back to Ziac  BP still high here    Back pain in thoracic area in area of bra  [place] twice last Xmas  Hit back in that area, but was hurting before hand  Has to straighten up  9/10 sometimes  No radiation  Would like Xray  Sees Chiropracter    Review of Systems   Respiratory: Negative for cough. Cardiovascular: Negative for chest pain. Gastrointestinal: Positive for heartburn. Negative for abdominal pain. Belching   Genitourinary: Negative for dysuria, frequency and urgency. Musculoskeletal: Positive for back pain. Neurological: Negative for sensory change and focal weakness. Past Medical History:   Diagnosis Date    Edema     Hypercholesterolemia     Hypertension     Insomnia, unspecified     Prediabetes     Unspecified vitamin D deficiency      Current Outpatient Prescriptions   Medication Sig Dispense Refill    bisoprolol-hydroCHLOROthiazide (ZIAC) 5-6.25 mg per tablet Take 1 Tab by mouth daily. 30 Tab 0    amLODIPine (NORVASC) 2.5 mg tablet Take 1 Tab by mouth daily. 30 Tab 0    Omeprazole delayed release (PRILOSEC D/R) 20 mg tablet Take 1 Tab by mouth daily. Indications: Heartburn 30 Tab 0    clorazepate (TRANXENE) 3.75 mg tablet Take half to one at night 45 Tab 0    niacin ER (NIASPAN) 500 mg tablet Take 1 Tab by mouth nightly. 30 Tab 3    losartan (COZAAR) 100 mg tablet TAKE ONE TABLET BY MOUTH ONCE DAILY 90 Tab 1    pravastatin (PRAVACHOL) 40 mg tablet Take 1 Tab by mouth nightly. 90 Tab 1    coenzyme q10-vitamin e (COQ10 ) 100-100 mg-unit cap Take 1 Tab by mouth daily.  omega-3 fatty acids-vitamin e (FISH OIL) 1,000 mg cap Take 2 Caps by mouth daily.       Cholecalciferol, Vitamin D3, (VITAMIN D3) 1,000 unit cap Take 2 Caps by mouth daily.  aspirin delayed-release 81 mg tablet Take  by mouth daily. Allergies   Allergen Reactions    Bactrim [Sulfamethoprim Ss] Nausea Only and Other (comments)     Blisters On  Lip     Ciprofloxacin Hives    Lisinopril Other (comments)     Felt bad     Visit Vitals    /57    Pulse 76    Temp 97.8 °F (36.6 °C)    Resp 16    Ht 5' 4.5\" (1.638 m)    Wt 182 lb 9.6 oz (82.8 kg)    SpO2 97%    BMI 30.86 kg/m2       Physical Exam   Constitutional: She is oriented to person, place, and time. She appears well-developed and well-nourished. No distress. HENT:   Head: Normocephalic and atraumatic. Eyes: Conjunctivae and EOM are normal.   Cardiovascular: Normal rate and regular rhythm. Pulmonary/Chest: Effort normal and breath sounds normal.   Musculoskeletal: She exhibits no edema or tenderness. Neurological: She is alert and oriented to person, place, and time. Skin: Skin is warm and dry. Psychiatric: She has a normal mood and affect. Nursing note and vitals reviewed. ASSESSMENT and PLAN    ICD-10-CM ICD-9-CM    1. Essential hypertension I10 401.9 bisoprolol-hydroCHLOROthiazide (ZIAC) 5-6.25 mg per tablet  Cont Cozaar      amLODIPine (NORVASC) 2.5 mg tablet started   2. Chronic bilateral thoracic back pain M54.6 724.1 XR SPINE THORAC 3 V    G89.29 338.29    3.  Gastroesophageal reflux disease without esophagitis K21.9 530.81 Omeprazole delayed release (PRILOSEC D/R) 20 mg tablet  Diet discussed   exercises discussed and HO given  recheck in 1 mo

## 2017-10-27 ENCOUNTER — TELEPHONE (OUTPATIENT)
Dept: FAMILY MEDICINE CLINIC | Age: 77
End: 2017-10-27

## 2017-10-27 DIAGNOSIS — M54.6 CHRONIC BILATERAL THORACIC BACK PAIN: ICD-10-CM

## 2017-10-27 DIAGNOSIS — G89.29 CHRONIC BILATERAL THORACIC BACK PAIN: ICD-10-CM

## 2017-10-27 NOTE — TELEPHONE ENCOUNTER
Patient had xray done @ [facility]. I have given them a call and they will fax over results shortly to us. I will ask one of the PSR to scan to Dr Alan Torres in basket as soon as it comes in .

## 2017-11-20 ENCOUNTER — OFFICE VISIT (OUTPATIENT)
Dept: FAMILY MEDICINE CLINIC | Age: 77
End: 2017-11-20

## 2017-11-20 VITALS
HEIGHT: 65 IN | BODY MASS INDEX: 30.49 KG/M2 | DIASTOLIC BLOOD PRESSURE: 62 MMHG | RESPIRATION RATE: 22 BRPM | TEMPERATURE: 96.2 F | HEART RATE: 56 BPM | SYSTOLIC BLOOD PRESSURE: 160 MMHG | WEIGHT: 183 LBS | OXYGEN SATURATION: 99 %

## 2017-11-20 DIAGNOSIS — K21.9 GASTROESOPHAGEAL REFLUX DISEASE WITHOUT ESOPHAGITIS: ICD-10-CM

## 2017-11-20 DIAGNOSIS — M54.6 MIDLINE THORACIC BACK PAIN, UNSPECIFIED CHRONICITY: ICD-10-CM

## 2017-11-20 DIAGNOSIS — I10 ESSENTIAL HYPERTENSION: Primary | ICD-10-CM

## 2017-11-20 DIAGNOSIS — E78.2 MIXED HYPERLIPIDEMIA: ICD-10-CM

## 2017-11-20 RX ORDER — PHENOL/SODIUM PHENOLATE
20 AEROSOL, SPRAY (ML) MUCOUS MEMBRANE DAILY
Qty: 90 TAB | Refills: 1 | Status: SHIPPED | OUTPATIENT
Start: 2017-11-20 | End: 2017-12-21 | Stop reason: SDUPTHER

## 2017-11-20 RX ORDER — BISOPROLOL FUMARATE AND HYDROCHLOROTHIAZIDE 5; 6.25 MG/1; MG/1
1 TABLET ORAL DAILY
Qty: 90 TAB | Refills: 1 | Status: SHIPPED | OUTPATIENT
Start: 2017-11-20 | End: 2017-11-28 | Stop reason: SDUPTHER

## 2017-11-20 RX ORDER — PRAVASTATIN SODIUM 40 MG/1
40 TABLET ORAL
Qty: 90 TAB | Refills: 1 | Status: SHIPPED | OUTPATIENT
Start: 2017-11-20 | End: 2018-03-12 | Stop reason: SDUPTHER

## 2017-11-20 RX ORDER — AMLODIPINE BESYLATE 2.5 MG/1
2.5 TABLET ORAL DAILY
Qty: 90 TAB | Refills: 1 | Status: SHIPPED | OUTPATIENT
Start: 2017-11-20 | End: 2018-04-26 | Stop reason: SDUPTHER

## 2017-11-20 NOTE — MR AVS SNAPSHOT
Visit Information Date & Time Provider Department Dept. Phone Encounter #  
 11/20/2017 10:30 AM Prince Sara MD [address] [place] [address] [phone] [id number] Follow-up Instructions Return in about 3 months (around 2/20/2018). Follow-up and Disposition History Upcoming Health Maintenance Date Due DTaP/Tdap/Td series (1 - Tdap) 6/12/1963 ZOSTER VACCINE AGE 60> 4/12/2002 GLAUCOMA SCREENING Q2Y 6/12/2007 Pneumococcal 65+ Low/Medium Risk (2 of 2 - PPSV23) 2/13/2018 MEDICARE YEARLY EXAM 2/14/2018 Allergies as of 11/20/2017  Review Complete On: 11/20/2017 By: Ray Holt RN Severity Noted Reaction Type Reactions Bactrim [Sulfamethoprim Ss] High 02/13/2017   Side Effect Nausea Only, Other (comments) Blisters On  Lip Ciprofloxacin  08/24/2015    Hives Lisinopril  08/05/2013    Other (comments) Felt bad Current Immunizations  Never Reviewed Name Date Pneumococcal Conjugate (PCV-13) 2/13/2017  9:15 AM  
  
 Not reviewed this visit You Were Diagnosed With   
  
 Codes Comments Essential hypertension    -  Primary ICD-10-CM: I10 
ICD-9-CM: 401.9 Midline thoracic back pain, unspecified chronicity     ICD-10-CM: M54.6 ICD-9-CM: 724.1 Gastroesophageal reflux disease without esophagitis     ICD-10-CM: K21.9 ICD-9-CM: 530.81 Mixed hyperlipidemia     ICD-10-CM: E78.2 ICD-9-CM: 272.2 Vitals BP Pulse Temp Resp Height(growth percentile) 160/62 (BP 1 Location: Left arm, BP Patient Position: Sitting) (!) 56 96.2 °F (35.7 °C) (Temporal) 22 5' 4.5\" (1.638 m) Weight(growth percentile) SpO2 BMI OB Status Smoking Status 183 lb (83 kg) 99% 30.93 kg/m2 Postmenopausal Former Smoker Vitals History BMI and BSA Data Body Mass Index Body Surface Area 30.93 kg/m 2 1.94 m 2 Preferred Pharmacy Pharmacy Name [facility] [facility] [address] [phone] Your Updated Medication List  
  
   
This list is accurate as of: 11/20/17 11:20 AM.  Always use your most recent med list. amLODIPine 2.5 mg tablet Commonly known as:  Rajivmat Dhillon Take 1 Tab by mouth daily. aspirin delayed-release 81 mg tablet Take  by mouth daily. bisoprolol-hydroCHLOROthiazide 5-6.25 mg per tablet Commonly known as:  [facility] Take 1 Tab by mouth daily. clorazepate 3.75 mg tablet Commonly known as:  TRANXENE Take half to one at night  
  
 COQ10  100-100 mg-unit Cap Generic drug:  coenzyme q10-vitamin e Take 1 Tab by mouth daily. FISH OIL 1,000 mg Cap Generic drug:  omega-3 fatty acids-vitamin e Take 2 Caps by mouth daily. losartan 100 mg tablet Commonly known as:  COZAAR  
TAKE ONE TABLET BY MOUTH ONCE DAILY  
  
 niacin  mg tablet Commonly known as:  Chauncey Rye Take 1 Tab by mouth nightly. Omeprazole delayed release 20 mg tablet Commonly known as:  PRILOSEC D/R Take 1 Tab by mouth daily. Indications: Heartburn  
  
 pravastatin 40 mg tablet Commonly known as:  PRAVACHOL Take 1 Tab by mouth nightly. VITAMIN D3 1,000 unit Cap Generic drug:  cholecalciferol Take 2 Caps by mouth daily. Prescriptions Sent to Pharmacy Refills Omeprazole delayed release (PRILOSEC D/R) 20 mg tablet 1 Sig: Take 1 Tab by mouth daily. Indications: Heartburn Class: Normal  
 Pharmacy: [address] [facility], [address] [facility] Ph #: [phone] Route: Oral  
 pravastatin (PRAVACHOL) 40 mg tablet 1 Sig: Take 1 Tab by mouth nightly. Class: Normal  
 Pharmacy: [address] [facility], [address] [facility] Ph #: [phone] Route: Oral  
 amLODIPine (NORVASC) 2.5 mg tablet 1 Sig: Take 1 Tab by mouth daily.   
 Class: Normal  
 Pharmacy: [address] [facility], [address]  [place] Ph #: [phone] Route: Oral  
 bisoprolol-hydroCHLOROthiazide (ZIAC) 5-6.25 mg per tablet 1 Sig: Take 1 Tab by mouth daily. Class: Normal  
 Pharmacy: [address] [facility], [address]  [place] Ph #: [phone] Route: Oral  
  
Follow-up Instructions Return in about 3 months (around 2/20/2018). Introducing [facility] SERVICES! Dear Rafael Aden: 
Thank you for requesting a Risk Ident account. Our records indicate that you have previously registered for a Risk Ident account but its currently inactive. Please call our Risk Ident support line at [phone]. Additional Information If you have questions, please visit the Frequently Asked Questions section of the Risk Ident website at [email or web address] Remember, Risk Ident is NOT to be used for urgent needs. For medical emergencies, dial 911. Now available from your iPhone and Android! Please provide this summary of care documentation to your next provider. Your primary care clinician is listed as Nancy Wing. If you have any questions after today's visit, please call [phone].

## 2017-11-20 NOTE — PROGRESS NOTES
HISTORY OF PRESENT ILLNESS  Shamika Delgadillo is a 78 y.o. female. Chief Complaint   Patient presents with    Hypertension     BP check       HPI  HTN  On Ziac, Cozaar and Norvasc 2.5  Here for recheck of BP  BP at home 120-140, mostly 130's/70's  Higher when coming here  No SE  Has checked out home cuff and reading correctly    Cough for a while  Worse with talking  Dust in house    Back pain better  Doing strengthening exercises  Xrays showed spondilosis and mild scoliosis and osteopenia  On Ca and Vit D    Needs refills of BP meds      Review of Systems   HENT: Positive for congestion (allergies in winter with dry heat). Respiratory: Positive for cough. Negative for sputum production, shortness of breath and wheezing. Cardiovascular: Negative for chest pain. Neurological: Positive for dizziness (occ when getting up). Negative for headaches. Past Medical History:   Diagnosis Date    Edema     Hypercholesterolemia     Hypertension     Insomnia, unspecified     Osteopenia     Prediabetes     Unspecified vitamin D deficiency      Current Outpatient Prescriptions   Medication Sig Dispense Refill    Omeprazole delayed release (PRILOSEC D/R) 20 mg tablet Take 1 Tab by mouth daily. Indications: Heartburn 90 Tab 1    pravastatin (PRAVACHOL) 40 mg tablet Take 1 Tab by mouth nightly. 90 Tab 1    amLODIPine (NORVASC) 2.5 mg tablet Take 1 Tab by mouth daily. 90 Tab 1    bisoprolol-hydroCHLOROthiazide (ZIAC) 5-6.25 mg per tablet Take 1 Tab by mouth daily. 90 Tab 1    clorazepate (TRANXENE) 3.75 mg tablet Take half to one at night 45 Tab 0    losartan (COZAAR) 100 mg tablet TAKE ONE TABLET BY MOUTH ONCE DAILY 90 Tab 1    coenzyme q10-vitamin e (COQ10 ) 100-100 mg-unit cap Take 1 Tab by mouth daily.  omega-3 fatty acids-vitamin e (FISH OIL) 1,000 mg cap Take 2 Caps by mouth daily.  Cholecalciferol, Vitamin D3, (VITAMIN D3) 1,000 unit cap Take 2 Caps by mouth daily.       aspirin delayed-release 81 mg tablet Take  by mouth daily.  niacin ER (NIASPAN) 500 mg tablet Take 1 Tab by mouth nightly. 30 Tab 3     Allergies   Allergen Reactions    Bactrim [Sulfamethoprim Ss] Nausea Only and Other (comments)     Blisters On  Lip     Ciprofloxacin Hives    Lisinopril Other (comments)     Felt bad     Visit Vitals    /62 (BP 1 Location: Left arm, BP Patient Position: Sitting)    Pulse (!) 56    Temp 96.2 °F (35.7 °C) (Temporal)    Resp 22    Ht 5' 4.5\" (1.638 m)    Wt 183 lb (83 kg)    SpO2 99%    BMI 30.93 kg/m2     Physical Exam   Constitutional: She is oriented to person, place, and time. She appears well-developed and well-nourished. No distress. HENT:   Head: Normocephalic and atraumatic. Eyes: Conjunctivae and EOM are normal. Pupils are equal, round, and reactive to light. Cardiovascular: Normal rate, regular rhythm and normal heart sounds. Pulmonary/Chest: Effort normal and breath sounds normal.   Musculoskeletal: She exhibits no edema. Lymphadenopathy:     She has no cervical adenopathy. Neurological: She is alert and oriented to person, place, and time. Skin: Skin is warm and dry. Psychiatric: She has a normal mood and affect. Nursing note and vitals reviewed. ASSESSMENT and PLAN    ICD-10-CM ICD-9-CM    1. Essential hypertension I10 401.9 amLODIPine (NORVASC) 2.5 mg tablet      bisoprolol-hydroCHLOROthiazide (ZIAC) 5-6.25 mg per tablet   2. Midline thoracic back pain, unspecified chronicity M54.6 724.1    3. Gastroesophageal reflux disease without esophagitis K21.9 530.81 Omeprazole delayed release (PRILOSEC D/R) 20 mg tablet   4.  Mixed hyperlipidemia E78.2 272.2 pravastatin (PRAVACHOL) 40 mg tablet

## 2017-11-21 DIAGNOSIS — I10 ESSENTIAL HYPERTENSION: ICD-10-CM

## 2017-11-21 RX ORDER — BISOPROLOL FUMARATE AND HYDROCHLOROTHIAZIDE 5; 6.25 MG/1; MG/1
TABLET ORAL
Qty: 30 TAB | Refills: 0 | OUTPATIENT
Start: 2017-11-21

## 2017-11-28 RX ORDER — BISOPROLOL FUMARATE AND HYDROCHLOROTHIAZIDE 5; 6.25 MG/1; MG/1
1 TABLET ORAL DAILY
Qty: 90 TAB | Refills: 1 | Status: SHIPPED | OUTPATIENT
Start: 2017-11-28 | End: 2018-06-05 | Stop reason: SDUPTHER

## 2017-11-28 NOTE — TELEPHONE ENCOUNTER
Please put in as a refill request  3 month supply  And set up her next appointment with me.   Thanks  OG

## 2017-11-29 NOTE — TELEPHONE ENCOUNTER
Patient would like to see Lucy Arshad now and schedule this appointment around her next lab draw. Can you either put in orders or let me know when they are due?

## 2017-12-15 ENCOUNTER — TELEPHONE (OUTPATIENT)
Dept: FAMILY MEDICINE CLINIC | Age: 77
End: 2017-12-15

## 2017-12-15 NOTE — TELEPHONE ENCOUNTER
----- Message from Luz Ayala sent at 12/15/2017  8:11 AM EST -----  Regarding: DALE Guerrero/Telephone  Pt stated she has problem with her nerves and requested an appt for Monday. Best contact number [phone](until 9:30 a.m.) (v)[phone].

## 2017-12-15 NOTE — TELEPHONE ENCOUNTER
Advised patient that at this time I do not have any openings but to call that morning for a SD. I'll also be on the look out for any cancellations.

## 2017-12-18 DIAGNOSIS — G47.00 INSOMNIA, UNSPECIFIED TYPE: ICD-10-CM

## 2017-12-18 RX ORDER — CLORAZEPATE DIPOTASSIUM 3.75 MG/1
TABLET ORAL
Qty: 45 TAB | Refills: 0 | OUTPATIENT
Start: 2017-12-18

## 2017-12-21 ENCOUNTER — OFFICE VISIT (OUTPATIENT)
Dept: FAMILY MEDICINE CLINIC | Age: 77
End: 2017-12-21

## 2017-12-21 VITALS
OXYGEN SATURATION: 99 % | TEMPERATURE: 97.3 F | BODY MASS INDEX: 30.49 KG/M2 | WEIGHT: 183 LBS | DIASTOLIC BLOOD PRESSURE: 74 MMHG | HEART RATE: 55 BPM | SYSTOLIC BLOOD PRESSURE: 140 MMHG | RESPIRATION RATE: 22 BRPM | HEIGHT: 65 IN

## 2017-12-21 DIAGNOSIS — K21.9 GASTROESOPHAGEAL REFLUX DISEASE WITHOUT ESOPHAGITIS: ICD-10-CM

## 2017-12-21 DIAGNOSIS — G47.00 INSOMNIA, UNSPECIFIED TYPE: ICD-10-CM

## 2017-12-21 RX ORDER — PHENOL/SODIUM PHENOLATE
20 AEROSOL, SPRAY (ML) MUCOUS MEMBRANE DAILY
Qty: 90 TAB | Refills: 1 | Status: SHIPPED | OUTPATIENT
Start: 2017-12-21 | End: 2019-03-26 | Stop reason: SDUPTHER

## 2017-12-21 RX ORDER — CLORAZEPATE DIPOTASSIUM 3.75 MG/1
TABLET ORAL
Qty: 45 TAB | Refills: 0 | Status: SHIPPED | OUTPATIENT
Start: 2017-12-21 | End: 2018-02-21 | Stop reason: SDUPTHER

## 2017-12-21 NOTE — MR AVS SNAPSHOT
Visit Information Date & Time Provider Department Dept. Phone Encounter #  
 12/21/2017  2:15 PM Leonides Farah NP [place] [id number] [facility] [phone] [id number] Upcoming Health Maintenance Date Due DTaP/Tdap/Td series (1 - Tdap) 6/12/1963 ZOSTER VACCINE AGE 60> 4/12/2002 GLAUCOMA SCREENING Q2Y 6/12/2007 Pneumococcal 65+ Low/Medium Risk (2 of 2 - PPSV23) 2/13/2018 MEDICARE YEARLY EXAM 2/14/2018 Allergies as of 12/21/2017  Review Complete On: 12/21/2017 By: Leonides Farah NP Severity Noted Reaction Type Reactions Bactrim [Sulfamethoprim Ss] High 02/13/2017   Side Effect Nausea Only, Other (comments) Blisters On  Lip Ciprofloxacin  08/24/2015    Hives Lisinopril  08/05/2013    Other (comments) Felt bad Current Immunizations  Never Reviewed Name Date Pneumococcal Conjugate (PCV-13) 2/13/2017  9:15 AM  
  
 Not reviewed this visit You Were Diagnosed With   
  
 Codes Comments Insomnia, unspecified type     ICD-10-CM: G47.00 ICD-9-CM: 780.52 Gastroesophageal reflux disease without esophagitis     ICD-10-CM: K21.9 ICD-9-CM: 530.81 Vitals BP Pulse Temp Resp Height(growth percentile) 140/74 (BP 1 Location: Left arm, BP Patient Position: Sitting) (!) 55 97.3 °F (36.3 °C) (Temporal) 22 5' 4.5\" (1.638 m) Weight(growth percentile) SpO2 BMI OB Status Smoking Status 183 lb (83 kg) 99% 30.93 kg/m2 Postmenopausal Former Smoker Vitals History BMI and BSA Data Body Mass Index Body Surface Area 30.93 kg/m 2 1.94 m 2 Preferred Pharmacy Pharmacy Name Phone [address] [facility] [address] [phone] Your Updated Medication List  
  
   
This list is accurate as of: 12/21/17  3:31 PM.  Always use your most recent med list. amLODIPine 2.5 mg tablet Commonly known as:  Kodi Elder Take 1 Tab by mouth daily. aspirin delayed-release 81 mg tablet Take  by mouth daily. bisoprolol-hydroCHLOROthiazide 5-6.25 mg per tablet Commonly known as:  [facility] Take 1 Tab by mouth daily. clorazepate 3.75 mg tablet Commonly known as:  TRANXENE Take half to one at night  Indications: anxiety COQ10  100-100 mg-unit Cap Generic drug:  coenzyme q10-vitamin e Take 1 Tab by mouth daily. FISH OIL 1,000 mg Cap Generic drug:  omega-3 fatty acids-vitamin e Take 2 Caps by mouth daily. losartan 100 mg tablet Commonly known as:  COZAAR  
TAKE ONE TABLET BY MOUTH ONCE DAILY  
  
 niacin  mg tablet Commonly known as:  Chauncey Rye Take 1 Tab by mouth nightly. Omeprazole delayed release 20 mg tablet Commonly known as:  PRILOSEC D/R Take 1 Tab by mouth daily. Indications: Heartburn  
  
 pravastatin 40 mg tablet Commonly known as:  PRAVACHOL Take 1 Tab by mouth nightly. VITAMIN D3 1,000 unit Cap Generic drug:  cholecalciferol Take 2 Caps by mouth daily. Prescriptions Printed Refills  
 clorazepate (TRANXENE) 3.75 mg tablet 0 Sig: Take half to one at night  Indications: anxiety Class: Print Prescriptions Sent to Pharmacy Refills Omeprazole delayed release (PRILOSEC D/R) 20 mg tablet 1 Sig: Take 1 Tab by mouth daily. Indications: Heartburn Class: Normal  
 Pharmacy: [address] [facility], [address] [facility] #: [phone] Route: Oral  
  
Introducing [facility] & HE[facility] SERVICES! Dear Anabell Rust: 
Thank you for requesting a Rain account. Our records indicate that you have previously registered for a Rain account but its currently inactive. Please call our Rain support line at [phone]. Additional Information If you have questions, please visit the Frequently Asked Questions section of the Rain website at [email or web address] Remember, MyChart is NOT to be used for urgent needs. For medical emergencies, dial 911. Now available from your iPhone and Android! Please provide this summary of care documentation to your next provider. Your primary care clinician is listed as Isidra Pereyra. If you have any questions after today's visit, please call [phone].

## 2018-02-21 ENCOUNTER — OFFICE VISIT (OUTPATIENT)
Dept: FAMILY MEDICINE CLINIC | Age: 78
End: 2018-02-21

## 2018-02-21 VITALS
TEMPERATURE: 98.1 F | OXYGEN SATURATION: 98 % | RESPIRATION RATE: 16 BRPM | HEIGHT: 63 IN | HEART RATE: 50 BPM | WEIGHT: 183 LBS | DIASTOLIC BLOOD PRESSURE: 70 MMHG | BODY MASS INDEX: 32.43 KG/M2 | SYSTOLIC BLOOD PRESSURE: 160 MMHG

## 2018-02-21 DIAGNOSIS — Z51.89 ENCOUNTER FOR PATIENT COMPLIANCE MONITORING IN DRUG TREATMENT PROGRAM: ICD-10-CM

## 2018-02-21 DIAGNOSIS — I10 ESSENTIAL HYPERTENSION: Primary | ICD-10-CM

## 2018-02-21 DIAGNOSIS — Z00.00 ENCOUNTER FOR MEDICARE ANNUAL WELLNESS EXAM: ICD-10-CM

## 2018-02-21 DIAGNOSIS — G47.00 INSOMNIA, UNSPECIFIED TYPE: ICD-10-CM

## 2018-02-21 DIAGNOSIS — E78.5 HYPERLIPIDEMIA, UNSPECIFIED HYPERLIPIDEMIA TYPE: ICD-10-CM

## 2018-02-21 RX ORDER — CLORAZEPATE DIPOTASSIUM 3.75 MG/1
TABLET ORAL
Qty: 45 TAB | Refills: 0 | Status: SHIPPED | OUTPATIENT
Start: 2018-02-21 | End: 2018-03-27 | Stop reason: SDUPTHER

## 2018-02-21 NOTE — MR AVS SNAPSHOT
[facility] 
 
 
 [address] 
[phone] Patient: Irene Corey MRN: [id number] RWS:[id number] Visit Information Date & Time Provider Department Dept. Phone Encounter #  
 2/21/2018  9:00 AM Mayi Sagastume NP [facility] [address] [facility] [phone] [id number] Your Appointments 2/21/2018  9:00 AM  
ESTABLISHED PATIENT with Mayi Sagastume NP  
[address] ([address]) Appt Note: check up/labs [address] [address]  
[address] [facility] [address] [address] Upcoming Health Maintenance Date Due  
 GLAUCOMA SCREENING Q2Y 6/12/2007 MEDICARE YEARLY EXAM 2/22/2019 DTaP/Tdap/Td series (2 - Td) 2/21/2028 Allergies as of 2/21/2018  Review Complete On: 2/21/2018 By: Karolina Hess RN Severity Noted Reaction Type Reactions Bactrim [Sulfamethoprim Ss] High 02/13/2017   Side Effect Nausea Only, Other (comments) Blisters On  Lip Ciprofloxacin  08/24/2015    Hives Lisinopril  08/05/2013    Other (comments) Felt bad Current Immunizations  Never Reviewed Name Date Pneumococcal Conjugate (PCV-13) 2/13/2017  9:15 AM  
  
 Not reviewed this visit You Were Diagnosed With   
  
 Codes Comments Essential hypertension    -  Primary ICD-10-CM: I10 
ICD-9-CM: 401.9 BMI 32.0-32.9,adult     ICD-10-CM: X80.23 
ICD-9-CM: V85.32 Insomnia, unspecified type     ICD-10-CM: G47.00 ICD-9-CM: 780.52 Encounter for Medicare annual wellness exam     ICD-10-CM: Z00.00 ICD-9-CM: V70.0 Hyperlipidemia, unspecified hyperlipidemia type     ICD-10-CM: E78.5 ICD-9-CM: 272.4 Encounter for patient compliance monitoring in drug treatment program     ICD-10-CM: Z51.89 ICD-9-CM: V72.85 Vitals BP Pulse Temp Resp Height(growth percentile) Weight(growth percentile) 160/70 (BP 1 Location: Left arm, BP Patient Position: Sitting) (!) 50 98.1 °F (36.7 °C) (Oral) 16 5' 2.5\" (1.588 m) 183 lb (83 kg) SpO2 BMI OB Status Smoking Status 98% 32.94 kg/m2 Postmenopausal Former Smoker BMI and BSA Data Body Mass Index Body Surface Area 32.94 kg/m 2 1.91 m 2 Preferred Pharmacy Pharmacy Name Phone [facility] [phone] [facility] [phone] Your Updated Medication List  
  
   
This list is accurate as of 2/21/18  8:46 AM.  Always use your most recent med list. amLODIPine 2.5 mg tablet Commonly known as:  Talat Nearing Take 1 Tab by mouth daily. aspirin delayed-release 81 mg tablet Take  by mouth daily. bisoprolol-hydroCHLOROthiazide 5-6.25 mg per tablet Commonly known as:  [facility] Take 1 Tab by mouth daily. clorazepate 3.75 mg tablet Commonly known as:  TRANXENE Take half to one at night  Indications: anxiety COQ10  100-100 mg-unit Cap Generic drug:  coenzyme q10-vitamin e Take 1 Tab by mouth daily. FISH OIL 1,000 mg Cap Generic drug:  omega-3 fatty acids-vitamin e Take 2 Caps by mouth daily. losartan 100 mg tablet Commonly known as:  COZAAR  
TAKE ONE TABLET BY MOUTH ONCE DAILY  
  
 niacin  mg tablet Commonly known as:  Masood He Take 1 Tab by mouth nightly. Omeprazole delayed release 20 mg tablet Commonly known as:  PRILOSEC D/R Take 1 Tab by mouth daily. Indications: Heartburn  
  
 pravastatin 40 mg tablet Commonly known as:  PRAVACHOL Take 1 Tab by mouth nightly. VITAMIN D3 1,000 unit Cap Generic drug:  cholecalciferol Take 2 Caps by mouth daily. Prescriptions Printed Refills  
 clorazepate (TRANXENE) 3.75 mg tablet 0 Sig: Take half to one at night  Indications: anxiety Class: Print We Performed the Following CBC WITH AUTOMATED DIFF [23369 CPT(R)] COLLECTION VENOUS BLOOD,VENIPUNCTURE [id number] CPT(R)] LIPID PANEL [32664 CPT(R)] METABOLIC PANEL, COMPREHENSIVE [05241 CPT(R)] MONITOR 14-DRUG CLASS PROFILE [facility] MEDROSA ELENA) [[id number] Custom] Introducing [facility]! Dear Edmond Felix: 
Thank you for requesting a [employer] account. Our records indicate that you have previously registered for a [employer] account but its currently inactive. Please call our [employer] support line at [phone]. Additional Information If you have questions, please visit the Frequently Asked Questions section of the [employer] website at [email or web address] 2 Step. [employer]. [employer]/Sunlight Foundationt/. Remember, It[employer] Pellets is NOT to be used for urgent needs. For medical emergencies, dial 911. Now available from your iPhone and Android! Please provide this summary of care documentation to your next provider. Your primary care clinician is listed as Mendel Pacheco. If you have any questions after today's visit, please call [phone].

## 2018-02-21 NOTE — ACP (ADVANCE CARE PLANNING)
Advance care planning discussed with patient form given and instructed to return to file a copy in chart.

## 2018-02-22 NOTE — PATIENT INSTRUCTIONS

## 2018-02-22 NOTE — PROGRESS NOTES
Subjective: Yoselin Mckinnon is a 78 y.o. female who presents today with the following:  Chief Complaint   Patient presents with    Annual Wellness Visit     subsequent       COMPLIANT WITH MEDICATION:   HTN; Denies chest pain, dyspnea, palpitations, headache and blurred vision. Blood pressure normotensive. ROS:  Gen: denies fever, chills, fatigue, weight loss, weight gain  HEENT:denies blurry vision, nasal congestion, sore throat  Resp: denies dypsnea, cough, wheezing  CV: denies chest pain radiating to the jaws or arms, palpitations, lower extremity edema  Abd: denies nausea, vomiting, diarrhea, constipation  Neuro: denies numbness/tingling  Endo: denies polyuria, polydipsia, heat/cold intolerance  Heme: no lymphadenopathy    Allergies   Allergen Reactions    Bactrim [Sulfamethoprim Ss] Nausea Only and Other (comments)     Blisters On  Lip     Ciprofloxacin Hives    Lisinopril Other (comments)     Felt bad         Current Outpatient Prescriptions:     clorazepate (TRANXENE) 3.75 mg tablet, Take half to one at night  Indications: anxiety, Disp: 45 Tab, Rfl: 0    Omeprazole delayed release (PRILOSEC D/R) 20 mg tablet, Take 1 Tab by mouth daily. Indications: Heartburn, Disp: 90 Tab, Rfl: 1    pravastatin (PRAVACHOL) 40 mg tablet, Take 1 Tab by mouth nightly., Disp: 90 Tab, Rfl: 1    amLODIPine (NORVASC) 2.5 mg tablet, Take 1 Tab by mouth daily. , Disp: 90 Tab, Rfl: 1    niacin ER (NIASPAN) 500 mg tablet, Take 1 Tab by mouth nightly., Disp: 30 Tab, Rfl: 3    losartan (COZAAR) 100 mg tablet, TAKE ONE TABLET BY MOUTH ONCE DAILY, Disp: 90 Tab, Rfl: 1    coenzyme q10-vitamin e (COQ10 ) 100-100 mg-unit cap, Take 1 Tab by mouth daily. , Disp: , Rfl:     omega-3 fatty acids-vitamin e (FISH OIL) 1,000 mg cap, Take 2 Caps by mouth daily. , Disp: , Rfl:     Cholecalciferol, Vitamin D3, (VITAMIN D3) 1,000 unit cap, Take 2 Caps by mouth daily. , Disp: , Rfl:     aspirin delayed-release 81 mg tablet, Take by mouth daily. , Disp: , Rfl:     bisoprolol-hydroCHLOROthiazide (ZIAC) 5-6.25 mg per tablet, Take 1 Tab by mouth daily. , Disp: 80 Tab, Rfl: 1    Past Medical History:   Diagnosis Date    Edema     Hypercholesterolemia     Hypertension     Insomnia, unspecified     Osteopenia     Prediabetes     Unspecified vitamin D deficiency        Past Surgical History:   Procedure Laterality Date    HX TUBAL LIGATION         History   Smoking Status    Former Smoker    Years: 40.00   Smokeless Tobacco    Never Used       Social History     Social History    Marital status:      Spouse name: N/A    Number of children: N/A    Years of education: N/A     Social History Main Topics    Smoking status: Former Smoker     Years: 40.00    Smokeless tobacco: Never Used    Alcohol use No    Drug use: None    Sexual activity: Not Asked     Other Topics Concern     Service No    Blood Transfusions No    Caffeine Concern No    Occupational Exposure No    Hobby Hazards No    Sleep Concern No    Stress Concern No    Weight Concern No    Special Diet No    Exercise Yes     Goes To [employer].  Seat Belt Yes    Self-Exams Yes     Social History Narrative       Family History   Problem Relation Age of Onset    Hypertension Mother     Diabetes Mother     Hypertension Father     Kidney Disease Father     Diabetes Sister     Diabetes Brother          Objective:     Visit Vitals    /70 (BP 1 Location: Left arm, BP Patient Position: Sitting)    Pulse (!) 50    Temp 98.1 °F (36.7 °C) (Oral)    Resp 16    Ht 5' 2.5\" (1.588 m)    Wt 183 lb (83 kg)    SpO2 98%    BMI 32.94 kg/m2     Body mass index is 32.94 kg/(m^2). General: Alert and oriented. No acute distress. Well nourished  HEENT :  Ears:TMs are normal. Canals are clear. Eyes: pupils equal, round, react to light and accommodation. Extra ocular movements intact. Nose: patent. Mouth and throat is clear.   Neck:supple full range of motion no thyromegaly. Trachea midline, No carotid bruits. No significant lymphadenopathy  Lungs[de-identified] clear to auscultation without wheezes, rales, or rhonchi. Heart :RRR, S1 & S2 are normal intensity. No murmur; no gallop  Abdomen: bowel sounds active. No tenderness, guarding, rebound, masses, hepatic or spleen enlargement  Back: no CVA tenderness. Extremities: without clubbing, cyanosis, or edema  Pulses: radial and femoral pulses are normal  Neuro: HMF intact. Cranial nerves II through XII grossly normal.  Motor: is 5 over 5 and symmetrical.   Deep tendon reflexes: +2 equal    Results for orders placed or performed in visit on 97/45/46   METABOLIC PANEL, COMPREHENSIVE   Result Value Ref Range    Glucose 116 (H) 65 - 99 mg/dL    BUN 22 8 - 27 mg/dL    Creatinine 1.10 (H) 0.57 - 1.00 mg/dL    GFR est non-AA 49 (L) >59 mL/min/1.73    GFR est AA 57 (L) >59 mL/min/1.73    BUN/Creatinine ratio 20 12 - 28    Sodium 125 (L) 134 - 144 mmol/L    Potassium 4.9 3.5 - 5.2 mmol/L    Chloride 87 (L) 96 - 106 mmol/L    CO2 25 18 - 29 mmol/L    Calcium 9.7 8.7 - 10.3 mg/dL    Protein, total 7.2 6.0 - 8.5 g/dL    Albumin 4.7 3.5 - 4.8 g/dL    GLOBULIN, TOTAL 2.5 1.5 - 4.5 g/dL    A-G Ratio 1.9 1.2 - 2.2    Bilirubin, total 0.4 0.0 - 1.2 mg/dL    Alk. phosphatase 55 39 - 117 IU/L    AST (SGOT) 29 0 - 40 IU/L    ALT (SGPT) 30 0 - 32 IU/L   CKD REPORT   Result Value Ref Range    Interpretation Note        No results found for this visit on 02/21/18. Assessment/ Plan:     Diagnoses and all orders for this visit:    1. Essential hypertension  -     CBC WITH AUTOMATED DIFF  -     LIPID PANEL  -     METABOLIC PANEL, COMPREHENSIVE  -     COLLECTION VENOUS BLOOD,VENIPUNCTURE    2. BMI 32.0-32.9,adult  -     CBC WITH AUTOMATED DIFF  -     LIPID PANEL  -     METABOLIC PANEL, COMPREHENSIVE  -     COLLECTION VENOUS BLOOD,VENIPUNCTURE    3. Insomnia, unspecified type  -     clorazepate (TRANXENE) 3.75 mg tablet;  Take half to one at night Indications: anxiety  -     MONITOR 14-DRUG CLASS PROFILE ([facility])    4. Encounter for Medicare annual wellness exam    5. Hyperlipidemia, unspecified hyperlipidemia type  -     CBC WITH AUTOMATED DIFF  -     LIPID PANEL  -     METABOLIC PANEL, COMPREHENSIVE  -     COLLECTION VENOUS BLOOD,VENIPUNCTURE    6. Encounter for patient compliance monitoring in drug treatment program         1. Essential hypertension    2. BMI 32.0-32.9,adult    3. Insomnia, unspecified type    4. Encounter for Medicare annual wellness exam    5. Hyperlipidemia, unspecified hyperlipidemia type    6. Encounter for patient compliance monitoring in drug treatment program        Orders Placed This Encounter    COLLECTION VENOUS BLOOD,VENIPUNCTURE    CBC WITH AUTOMATED DIFF    LIPID PANEL    METABOLIC PANEL, COMPREHENSIVE    MONITOR 14-DRUG CLASS PROFILE ([facility])    clorazepate (TRANXENE) 3.75 mg tablet     Sig: Take half to one at night  Indications: anxiety     Dispense:  45 Tab     Refill:  0         Verbal and written instructions (see AVS) provided.  Patient expresses understanding of diagnosis and treatment plan. Follow-up Disposition:  Return in about 6 months (around 8/21/2018). ALF Gonzalez        Brenda Katz is a 78 y.o. female and presents for annual Medicare Wellness Visit. Problem List: Reviewed with patient and discussed risk factors.     Patient Active Problem List   Diagnosis Code    Hypertension I10    Hyperlipemia E78.5    Unspecified vitamin D deficiency E55.9    Pre-diabetes R73.03    Prediabetes R73.03    ACP (advance care planning) Z71.89       Current medical providers:  Patient Care Team:  Tomeka Baron NP as PCP - General (Nurse Practitioner)    PSH: Reviewed with patient  Past Surgical History:   Procedure Laterality Date    HX TUBAL LIGATION          SH: Reviewed with patient  Social History   Substance Use Topics    Smoking status: Former Smoker     Years: 40.00  Smokeless tobacco: Never Used    Alcohol use No       FH: Reviewed with patient  Family History   Problem Relation Age of Onset   [employer] Hypertension Mother     Diabetes Mother     Hypertension Father     Kidney Disease Father     Diabetes Sister     Diabetes Brother        Medications/Allergies: Reviewed with patient  Current Outpatient Prescriptions on File Prior to Visit   Medication Sig Dispense Refill    Omeprazole delayed release (PRILOSEC D/R) 20 mg tablet Take 1 Tab by mouth daily. Indications: Heartburn 90 Tab 1    pravastatin (PRAVACHOL) 40 mg tablet Take 1 Tab by mouth nightly. 90 Tab 1    amLODIPine (NORVASC) 2.5 mg tablet Take 1 Tab by mouth daily. 90 Tab 1    niacin ER (NIASPAN) 500 mg tablet Take 1 Tab by mouth nightly. 30 Tab 3    losartan (COZAAR) 100 mg tablet TAKE ONE TABLET BY MOUTH ONCE DAILY 90 Tab 1    coenzyme q10-vitamin e (COQ10 ) 100-100 mg-unit cap Take 1 Tab by mouth daily.  omega-3 fatty acids-vitamin e (FISH OIL) 1,000 mg cap Take 2 Caps by mouth daily.  Cholecalciferol, Vitamin D3, (VITAMIN D3) 1,000 unit cap Take 2 Caps by mouth daily.  aspirin delayed-release 81 mg tablet Take  by mouth daily.  bisoprolol-hydroCHLOROthiazide (ZIAC) 5-6.25 mg per tablet Take 1 Tab by mouth daily. 90 Tab 1     No current facility-administered medications on file prior to visit. Allergies   Allergen Reactions    Bactrim [Sulfamethoprim Ss] Nausea Only and Other (comments)     Blisters On  Lip     Ciprofloxacin Hives    Lisinopril Other (comments)     Felt bad       Objective:  Visit Vitals    /70 (BP 1 Location: Left arm, BP Patient Position: Sitting)    Pulse (!) 50    Temp 98.1 °F (36.7 °C) (Oral)    Resp 16    Ht 5' 2.5\" (1.588 m)    Wt 183 lb (83 kg)    SpO2 98%    BMI 32.94 kg/m2    Body mass index is 32.94 kg/(m^2).     Assessment of cognitive impairment: Alert and oriented x 3    Depression Screen:   PHQ over the last two weeks 2/21/2018 Little interest or pleasure in doing things Not at all   Feeling down, depressed or hopeless Not at all   Total Score PHQ 2 0       Fall Risk Assessment:    Fall Risk Assessment, last 12 mths 2/21/2018   Able to walk? Yes   Fall in past 12 months? Yes   Fall with injury? No   Number of falls in past 12 months 1   Fall Risk Score 1       Functional Ability:   Does the patient exhibit a steady gait? yes   How long did it take the patient to get up and walk from a sitting position? 3seconds   Is the patient self reliant?  (ie can do own laundry, meals, household chores)  yes     Does the patient handle his/her own medications? yes     Does the patient handle his/her own money? yes     Is the patients home safe (ie good lighting, handrails on stairs and bath, etc.)? yes     Did you notice or did patient express any hearing difficulties? no     Did you notice or did patient express any vision difficulties?   no     Were distance and reading eye charts used? no       Advance Care Planning:   Patient was offered the opportunity to discuss advance care planning:  yes     Does patient have an Advance Directive:  no   If no, did you provide information on Caring Connections? yes       Plan:      Orders Placed This Encounter    COLLECTION VENOUS BLOOD,VENIPUNCTURE    CBC WITH AUTOMATED DIFF    LIPID PANEL    METABOLIC PANEL, COMPREHENSIVE    MONITOR 14-DRUG CLASS PROFILE ([facility])    clorazepate (TRANXENE) 3.75 mg tablet       Health Maintenance   Topic Date Due    GLAUCOMA SCREENING Q2Y  06/12/2007    MEDICARE YEARLY EXAM  02/22/2019    DTaP/Tdap/Td series (2 - Td) 02/21/2028    OSTEOPOROSIS SCREENING (DEXA)  Completed    ZOSTER VACCINE AGE 60>  Addressed    Pneumococcal 65+ Low/Medium Risk  Addressed    Influenza Age 5 to Adult  Addressed       *Patient verbalized understanding and agreement with the plan.   A copy of the After Visit Summary with personalized health plan was given to the patient today. Follow-up Disposition:  Return in about 6 months (around 8/21/2018).         ROSALIE Pichardo

## 2018-02-26 LAB
ALBUMIN SERPL-MCNC: 4.8 G/DL (ref 3.5–4.8)
ALBUMIN/GLOB SERPL: 1.9 {RATIO} (ref 1.2–2.2)
ALP SERPL-CCNC: 62 IU/L (ref 39–117)
ALPRAZ UR QL: NEGATIVE
ALT SERPL-CCNC: 37 IU/L (ref 0–32)
AMPHETAMINES UR QL SCN: NEGATIVE NG/ML
AST SERPL-CCNC: 38 IU/L (ref 0–40)
BARBITURATES UR QL SCN: NEGATIVE NG/ML
BASOPHILS # BLD AUTO: 0 X10E3/UL (ref 0–0.2)
BASOPHILS NFR BLD AUTO: 1 %
BENZODIAZ UR QL SCN: NORMAL NG/ML
BENZODIAZ UR QL: POSITIVE NG/ML
BILIRUB SERPL-MCNC: 0.5 MG/DL (ref 0–1.2)
BUN SERPL-MCNC: 22 MG/DL (ref 8–27)
BUN/CREAT SERPL: 18 (ref 12–28)
BUPRENORPHINE UR QL: NEGATIVE NG/ML
BZE UR QL SCN: NEGATIVE NG/ML
CALCIUM SERPL-MCNC: 9.8 MG/DL (ref 8.7–10.3)
CANNABINOIDS UR QL SCN: NEGATIVE NG/ML
CHLORIDE SERPL-SCNC: 91 MMOL/L (ref 96–106)
CHOLEST SERPL-MCNC: 172 MG/DL (ref 100–199)
CLONAZEPAM UR QL: NEGATIVE
CO2 SERPL-SCNC: 22 MMOL/L (ref 18–29)
CREAT SERPL-MCNC: 1.24 MG/DL (ref 0.57–1)
CREAT UR-MCNC: 51.1 MG/DL (ref 20–300)
EOSINOPHIL # BLD AUTO: 0.5 X10E3/UL (ref 0–0.4)
EOSINOPHIL NFR BLD AUTO: 8 %
ERYTHROCYTE [DISTWIDTH] IN BLOOD BY AUTOMATED COUNT: 13.4 % (ref 12.3–15.4)
FENTANYL+NORFENTANYL UR QL SCN: NEGATIVE PG/ML
FLURAZEPAM UR QL: NEGATIVE
GFR SERPLBLD CREATININE-BSD FMLA CKD-EPI: 43 ML/MIN/1.73
GFR SERPLBLD CREATININE-BSD FMLA CKD-EPI: 49 ML/MIN/1.73
GLOBULIN SER CALC-MCNC: 2.5 G/DL (ref 1.5–4.5)
GLUCOSE SERPL-MCNC: 93 MG/DL (ref 65–99)
HCT VFR BLD AUTO: 35.9 % (ref 34–46.6)
HDLC SERPL-MCNC: 40 MG/DL
HGB BLD-MCNC: 11.8 G/DL (ref 11.1–15.9)
IMM GRANULOCYTES # BLD: 0 X10E3/UL (ref 0–0.1)
IMM GRANULOCYTES NFR BLD: 0 %
INTERPRETATION: NORMAL
LDLC SERPL CALC-MCNC: 93 MG/DL (ref 0–99)
LORAZEPAM UR QL: NEGATIVE
LYMPHOCYTES # BLD AUTO: 2.1 X10E3/UL (ref 0.7–3.1)
LYMPHOCYTES NFR BLD AUTO: 33 %
MCH RBC QN AUTO: 30.2 PG (ref 26.6–33)
MCHC RBC AUTO-ENTMCNC: 32.9 G/DL (ref 31.5–35.7)
MCV RBC AUTO: 92 FL (ref 79–97)
MEPERIDINE UR QL: NEGATIVE NG/ML
METHADONE UR QL SCN: NEGATIVE NG/ML
MIDAZOLAM UR QL CFM: NEGATIVE
MONOCYTES # BLD AUTO: 0.4 X10E3/UL (ref 0.1–0.9)
MONOCYTES NFR BLD AUTO: 7 %
NEUTROPHILS # BLD AUTO: 3.2 X10E3/UL (ref 1.4–7)
NEUTROPHILS NFR BLD AUTO: 51 %
NORDIAZEPAM UR QL: NEGATIVE
OPIATES UR QL SCN: NEGATIVE NG/ML
OXAZEPAM UR CFM-MCNC: 329 NG/ML
OXAZEPAM UR QL: POSITIVE
OXYCODONE+OXYMORPHONE UR QL SCN: NEGATIVE NG/ML
PCP UR QL: NEGATIVE NG/ML
PH UR: 6.6 [PH] (ref 4.5–8.9)
PLATELET # BLD AUTO: 260 X10E3/UL (ref 150–379)
PLEASE NOTE:, 733157: NORMAL
POTASSIUM SERPL-SCNC: 4.7 MMOL/L (ref 3.5–5.2)
PROPOXYPH UR QL SCN: NEGATIVE NG/ML
PROT SERPL-MCNC: 7.3 G/DL (ref 6–8.5)
RBC # BLD AUTO: 3.91 X10E6/UL (ref 3.77–5.28)
SODIUM SERPL-SCNC: 133 MMOL/L (ref 134–144)
SP GR UR: 1.01
TEMAZEPAM UR QL CFM: NEGATIVE
TRAMADOL UR QL SCN: NEGATIVE NG/ML
TRIAZOLAM UR QL: NEGATIVE
TRIGL SERPL-MCNC: 195 MG/DL (ref 0–149)
VLDLC SERPL CALC-MCNC: 39 MG/DL (ref 5–40)
WBC # BLD AUTO: 6.3 X10E3/UL (ref 3.4–10.8)

## 2018-03-01 NOTE — PROGRESS NOTES
CBC essentially normal  Lipid panel triglycerides are up slightly  Metabolic panel: Decline in kidney function persistent from 2 years ago. Plan to continue monitor closely. Recommend tylenol for pain relief.   UDS compliant

## 2018-03-02 ENCOUNTER — TELEPHONE (OUTPATIENT)
Dept: FAMILY MEDICINE CLINIC | Age: 78
End: 2018-03-02

## 2018-03-02 NOTE — TELEPHONE ENCOUNTER
----- Message from Gwendolyn Singleton sent at 3/2/2018 10:36 AM EST -----  Regarding: Haritha HUNTER Ion / Telephone  Pt stated she has not yet received her blood work test results.  Best contact: [phone] and [phone]

## 2018-03-12 ENCOUNTER — TELEPHONE (OUTPATIENT)
Dept: FAMILY MEDICINE CLINIC | Age: 78
End: 2018-03-12

## 2018-03-12 DIAGNOSIS — E78.2 MIXED HYPERLIPIDEMIA: ICD-10-CM

## 2018-03-12 RX ORDER — PRAVASTATIN SODIUM 40 MG/1
40 TABLET ORAL
Qty: 90 TAB | Refills: 1 | Status: SHIPPED | OUTPATIENT
Start: 2018-03-12 | End: 2018-10-11 | Stop reason: ALTCHOICE

## 2018-03-27 DIAGNOSIS — G47.00 INSOMNIA, UNSPECIFIED TYPE: ICD-10-CM

## 2018-03-27 NOTE — TELEPHONE ENCOUNTER
Patient would like to start getting clorazepate from her home delivery. She is requesting a 90d supply with refills please. [employer].

## 2018-03-28 RX ORDER — CLORAZEPATE DIPOTASSIUM 3.75 MG/1
TABLET ORAL
Qty: 45 TAB | Refills: 0 | Status: SHIPPED | OUTPATIENT
Start: 2018-03-28 | End: 2018-05-18 | Stop reason: SDUPTHER

## 2018-04-16 DIAGNOSIS — I10 ESSENTIAL HYPERTENSION: ICD-10-CM

## 2018-04-16 RX ORDER — LOSARTAN POTASSIUM 100 MG/1
TABLET ORAL
Qty: 90 TAB | Refills: 1 | Status: SHIPPED | OUTPATIENT
Start: 2018-04-16 | End: 2019-01-16 | Stop reason: SDUPTHER

## 2018-04-16 NOTE — TELEPHONE ENCOUNTER
Wants to switch her Losartan Rx  from [facility] to [facility]. 90 day supply please.

## 2018-04-26 DIAGNOSIS — I10 ESSENTIAL HYPERTENSION: ICD-10-CM

## 2018-04-27 RX ORDER — AMLODIPINE BESYLATE 2.5 MG/1
2.5 TABLET ORAL DAILY
Qty: 90 TAB | Refills: 1 | Status: SHIPPED | OUTPATIENT
Start: 2018-04-27 | End: 2018-10-22 | Stop reason: SDUPTHER

## 2018-05-18 DIAGNOSIS — G47.00 INSOMNIA, UNSPECIFIED TYPE: ICD-10-CM

## 2018-05-18 RX ORDER — CLORAZEPATE DIPOTASSIUM 3.75 MG/1
TABLET ORAL
Qty: 45 TAB | Refills: 0 | Status: SHIPPED | OUTPATIENT
Start: 2018-05-18 | End: 2018-06-29 | Stop reason: SDUPTHER

## 2018-05-18 NOTE — TELEPHONE ENCOUNTER
----- Message from Pedrito Garza sent at 5/18/2018  8:29 AM EDT -----  Regarding: Dr Ayala Score  Pt needs a refill on Clorazepate 3.75 mg call into [facility] [phone], pt can be reach [phone].

## 2018-06-05 DIAGNOSIS — I10 ESSENTIAL HYPERTENSION: ICD-10-CM

## 2018-06-05 RX ORDER — BISOPROLOL FUMARATE AND HYDROCHLOROTHIAZIDE 5; 6.25 MG/1; MG/1
TABLET ORAL
Qty: 90 TAB | Refills: 1 | Status: SHIPPED | OUTPATIENT
Start: 2018-06-05 | End: 2019-02-19 | Stop reason: SDUPTHER

## 2018-06-29 DIAGNOSIS — G47.00 INSOMNIA, UNSPECIFIED TYPE: ICD-10-CM

## 2018-07-03 NOTE — TELEPHONE ENCOUNTER
Cesar Osorio Np and Dr Kamari Alvarez out of office. Message routed to covering provider to approve refill.

## 2018-07-05 RX ORDER — CLORAZEPATE DIPOTASSIUM 3.75 MG/1
TABLET ORAL
Qty: 10 TAB | Refills: 0 | OUTPATIENT
Start: 2018-07-05 | End: 2018-07-13 | Stop reason: SDUPTHER

## 2018-07-05 NOTE — TELEPHONE ENCOUNTER
Rx called in pharmacy per instruction from Dr Eduardo Mitchell. You can authorize fill of #10 pills, please call in.  (Routing comment

## 2018-07-12 DIAGNOSIS — G47.00 INSOMNIA, UNSPECIFIED TYPE: ICD-10-CM

## 2018-07-12 NOTE — TELEPHONE ENCOUNTER
Please send in Clorazepate to [facility]. Only #10 was sent last time. Please send in #30 with some refills so she will not have to call every month. [phone]  It costs her the same to get 10 pills as well as 30.

## 2018-07-13 DIAGNOSIS — G47.00 INSOMNIA, UNSPECIFIED TYPE: ICD-10-CM

## 2018-07-13 RX ORDER — CLORAZEPATE DIPOTASSIUM 3.75 MG/1
TABLET ORAL
Qty: 30 TAB | Refills: 0 | Status: CANCELLED | OUTPATIENT
Start: 2018-07-13

## 2018-07-13 RX ORDER — CLORAZEPATE DIPOTASSIUM 3.75 MG/1
TABLET ORAL
Qty: 30 TAB | Refills: 0 | Status: SHIPPED | OUTPATIENT
Start: 2018-07-13 | End: 2018-08-07 | Stop reason: SDUPTHER

## 2018-07-13 NOTE — TELEPHONE ENCOUNTER
Pt called again requesting to have this filled, she is on her last two pills and needs more for the weekend.  Please send to [facility] in [address]

## 2018-08-16 DIAGNOSIS — G47.00 INSOMNIA, UNSPECIFIED TYPE: ICD-10-CM

## 2018-08-16 NOTE — TELEPHONE ENCOUNTER
RX refill request has been put in and routed to [place]. I did not change the quantity, left for Shoshana to decide because the previous order has 0 refill. The correct pharmacy is already on the previous order.

## 2018-08-16 NOTE — TELEPHONE ENCOUNTER
Patient needs to change her Clorazepate to [facility]. She takes one a day. Needs a 90 day supply. Her  has just passed away.

## 2018-08-17 RX ORDER — CLORAZEPATE DIPOTASSIUM 3.75 MG/1
3.75 TABLET ORAL
Qty: 90 TAB | Refills: 0 | Status: SHIPPED | OUTPATIENT
Start: 2018-08-17 | End: 2018-11-15 | Stop reason: SDUPTHER

## 2018-10-22 DIAGNOSIS — I10 ESSENTIAL HYPERTENSION: ICD-10-CM

## 2018-10-22 RX ORDER — AMLODIPINE BESYLATE 2.5 MG/1
TABLET ORAL
Qty: 90 TAB | Refills: 1 | Status: SHIPPED | OUTPATIENT
Start: 2018-10-22 | End: 2019-04-16 | Stop reason: SDUPTHER

## 2019-01-16 DIAGNOSIS — I10 ESSENTIAL HYPERTENSION: ICD-10-CM

## 2019-01-17 RX ORDER — LOSARTAN POTASSIUM 100 MG/1
TABLET ORAL
Qty: 90 TAB | Refills: 1 | Status: SHIPPED | OUTPATIENT
Start: 2019-01-17 | End: 2019-06-28 | Stop reason: SDUPTHER

## 2019-02-19 DIAGNOSIS — I10 ESSENTIAL HYPERTENSION: ICD-10-CM

## 2019-02-20 RX ORDER — BISOPROLOL FUMARATE AND HYDROCHLOROTHIAZIDE 5; 6.25 MG/1; MG/1
TABLET ORAL
Qty: 90 TAB | Refills: 1 | Status: SHIPPED | OUTPATIENT
Start: 2019-02-20 | End: 2019-05-22 | Stop reason: ALTCHOICE

## 2019-05-06 PROBLEM — E87.1 HYPONATREMIA: Status: ACTIVE | Noted: 2019-05-06

## 2020-01-27 NOTE — PROGRESS NOTES
Subjective: Hu Meyers is a 78 y.o. female who presents today with the following:  Chief Complaint   Patient presents with    Heartburn    [address] [facility] presents for f/u [facility] medical conditions listed below. COMPLIANT WITH MEDICATION:   HTN; Denies chest pain, dyspnea, palpitations, headache and blurred vision. Blood pressure normotensive today. GERD: Diet controlled    Insomnia{ discussed the use of benadryl for sleep in the elderly. ROS:  Gen: denies fever, chills, fatigue, weight loss, weight gain  HEENT:denies blurry vision, nasal congestion, sore throat  Resp: denies dypsnea, cough, wheezing  CV: denies chest pain radiating to the jaws or arms, palpitations, lower extremity edema  Abd: denies nausea, vomiting, diarrhea, constipation  Neuro: denies numbness/tingling  Endo: denies polyuria, polydipsia, heat/cold intolerance  Heme: no lymphadenopathy    Allergies   Allergen Reactions    Bactrim [Sulfamethoprim Ss] Nausea Only and Other (comments)     Blisters On  Lip     Ciprofloxacin Hives    Lisinopril Other (comments)     Felt bad         Current Outpatient Prescriptions:     clorazepate (TRANXENE) 3.75 mg tablet, Take half to one at night  Indications: anxiety, Disp: 45 Tab, Rfl: 0    Omeprazole delayed release (PRILOSEC D/R) 20 mg tablet, Take 1 Tab by mouth daily. Indications: Heartburn, Disp: 90 Tab, Rfl: 1    bisoprolol-hydroCHLOROthiazide (ZIAC) 5-6.25 mg per tablet, Take 1 Tab by mouth daily. , Disp: 90 Tab, Rfl: 1    pravastatin (PRAVACHOL) 40 mg tablet, Take 1 Tab by mouth nightly., Disp: 90 Tab, Rfl: 1    amLODIPine (NORVASC) 2.5 mg tablet, Take 1 Tab by mouth daily. , Disp: 90 Tab, Rfl: 1    niacin ER (NIASPAN) 500 mg tablet, Take 1 Tab by mouth nightly., Disp: 30 Tab, Rfl: 3    losartan (COZAAR) 100 mg tablet, TAKE ONE TABLET BY MOUTH ONCE DAILY, Disp: 90 Tab, Rfl: 1    coenzyme q10-vitamin e (COQ10 ) 100-100 mg-unit cap, Take 1 Tab by mouth daily., Disp: , Rfl:     omega-3 fatty acids-vitamin e (FISH OIL) 1,000 mg cap, Take 2 Caps by mouth daily. , Disp: , Rfl:     Cholecalciferol, Vitamin D3, (VITAMIN D3) 1,000 unit cap, Take 2 Caps by mouth daily. , Disp: , Rfl:     aspirin delayed-release 81 mg tablet, Take  by mouth daily. , Disp: , Rfl:     Past Medical History:   Diagnosis Date    Edema     Hypercholesterolemia     Hypertension     Insomnia, unspecified     Osteopenia     Prediabetes     Unspecified vitamin D deficiency        Past Surgical History:   Procedure Laterality Date    HX TUBAL LIGATION         History   Smoking Status    Former Smoker    Years: 40.00   Smokeless Tobacco    Never Used       Social History     Social History    Marital status:      Spouse name: N/A    Number of children: N/A    Years of education: N/A     Social History Main Topics    Smoking status: Former Smoker     Years: 40.00    Smokeless tobacco: Never Used    Alcohol use No    Drug use: None    Sexual activity: Not Asked     Other Topics Concern     Service No    Blood Transfusions No    Caffeine Concern No    Occupational Exposure No    Hobby Hazards No    Sleep Concern No    Stress Concern No    Weight Concern No    Special Diet No    Exercise Yes     Goes To [employer].  Seat Belt Yes    Self-Exams Yes     Social History Narrative       Family History   Problem Relation Age of Onset   Phyllis Coleman Hypertension Mother     Diabetes Mother     Hypertension Father     Kidney Disease Father     Diabetes Sister     Diabetes Brother          Objective:     Visit Vitals    /74 (BP 1 Location: Left arm, BP Patient Position: Sitting)    Pulse (!) 55    Temp 97.3 °F (36.3 °C) (Temporal)    Resp 22    Ht 5' 4.5\" (1.638 m)    Wt 183 lb (83 kg)    SpO2 99%    BMI 30.93 kg/m2     Body mass index is 30.93 kg/(m^2). General: Alert and oriented. No acute distress.   Well nourished  HEENT :  Ears:TMs are normal. Canals are clear.   Eyes: pupils equal, round, react to light and accommodation. Extra ocular movements intact. Nose: patent. Mouth and throat is clear. Neck:supple full range of motion no thyromegaly. Trachea midline, No carotid bruits. No significant lymphadenopathy  Lungs[de-identified] clear to auscultation without wheezes, rales, or rhonchi. Heart :RRR, S1 & S2 are normal intensity. No murmur; no gallop  Abdomen: bowel sounds active. No tenderness, guarding, rebound, masses, hepatic or spleen enlargement  Back: no CVA tenderness. Extremities: without clubbing, cyanosis, or edema  Pulses: radial and femoral pulses are normal  Neuro: HMF intact. Cranial nerves II through XII grossly normal.  Motor: is 5 over 5 and symmetrical.   Deep tendon reflexes: +2 equal    Results for orders placed or performed in visit on 83/67/39   METABOLIC PANEL, COMPREHENSIVE   Result Value Ref Range    Glucose 116 (H) 65 - 99 mg/dL    BUN 22 8 - 27 mg/dL    Creatinine 1.10 (H) 0.57 - 1.00 mg/dL    GFR est non-AA 49 (L) >59 mL/min/1.73    GFR est AA 57 (L) >59 mL/min/1.73    BUN/Creatinine ratio 20 12 - 28    Sodium 125 (L) 134 - 144 mmol/L    Potassium 4.9 3.5 - 5.2 mmol/L    Chloride 87 (L) 96 - 106 mmol/L    CO2 25 18 - 29 mmol/L    Calcium 9.7 8.7 - 10.3 mg/dL    Protein, total 7.2 6.0 - 8.5 g/dL    Albumin 4.7 3.5 - 4.8 g/dL    GLOBULIN, TOTAL 2.5 1.5 - 4.5 g/dL    A-G Ratio 1.9 1.2 - 2.2    Bilirubin, total 0.4 0.0 - 1.2 mg/dL    Alk. phosphatase 55 39 - 117 IU/L    AST (SGOT) 29 0 - 40 IU/L    ALT (SGPT) 30 0 - 32 IU/L   CKD REPORT   Result Value Ref Range    Interpretation Note        No results found for this visit on 12/21/17. Assessment/ Plan:     Diagnoses and all orders for this visit:    1. Insomnia, unspecified type  -     clorazepate (TRANXENE) 3.75 mg tablet; Take half to one at night  Indications: anxiety    2. Gastroesophageal reflux disease without esophagitis  -     Omeprazole delayed release (PRILOSEC D/R) 20 mg tablet;  Take 1 Tab by mouth daily. Indications: Heartburn         1. Insomnia, unspecified type    2. Gastroesophageal reflux disease without esophagitis        Orders Placed This Encounter    clorazepate (TRANXENE) 3.75 mg tablet     Sig: Take half to one at night  Indications: anxiety     Dispense:  45 Tab     Refill:  0    Omeprazole delayed release (PRILOSEC D/R) 20 mg tablet     Sig: Take 1 Tab by mouth daily. Indications: Heartburn     Dispense:  90 Tab     Refill:  1         Verbal and written instructions (see AVS) provided.  Patient expresses understanding of diagnosis and treatment plan. Follow-up Disposition:  Return in about 4 months (around 4/21/2018).       ALF Martinez ROM intact/strength intact/normal shape

## 2021-03-11 ENCOUNTER — OFFICE VISIT (OUTPATIENT)
Dept: FAMILY MEDICINE CLINIC | Age: 81
End: 2021-03-11
Payer: MEDICARE

## 2021-03-11 VITALS
SYSTOLIC BLOOD PRESSURE: 170 MMHG | HEART RATE: 63 BPM | TEMPERATURE: 97.7 F | BODY MASS INDEX: 30.96 KG/M2 | RESPIRATION RATE: 17 BRPM | OXYGEN SATURATION: 98 % | HEIGHT: 63 IN | DIASTOLIC BLOOD PRESSURE: 80 MMHG

## 2021-03-11 DIAGNOSIS — I10 ESSENTIAL HYPERTENSION: ICD-10-CM

## 2021-03-11 PROCEDURE — 99213 OFFICE O/P EST LOW 20 MIN: CPT | Performed by: NURSE PRACTITIONER

## 2021-03-11 RX ORDER — AMLODIPINE BESYLATE 5 MG/1
5 TABLET ORAL 2 TIMES DAILY
Qty: 90 TAB | Refills: 3
Start: 2021-03-11 | End: 2021-03-19 | Stop reason: SDUPTHER

## 2021-03-11 NOTE — PROGRESS NOTES
Chief Complaint   Patient presents with    Blood Pressure Check       HPI:    Ann Gilbert is a 68 y.o. female for a follow up visit. R Knee pain - tolerable. No longer seeing ortho, unable to go to PT due to time issues. 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     Anxiety/depression - Symptoms have improved. She is still grieving the death of her sister and she also lost her brother in February 2020. Reports strong social support. See below.     Hyponatremia - Stable, no longer on sodium supplements. Hypertension - On metoprolol QAM, amlodipine 5 mg QAM, and losartan QPM. Denies CP, weakness. Monitors BP at home, average systolic 956M. Highest reading was 269 systolic and this was abnormal.     New issues: Anxiety issues have improved, palpitations have improved. She feels well overall. Home BPs are good per patient but she doesn't have her log with her today. She reports this morning's BP was 120/64. She is compliant with her medication. She wants to discuss stopping losartan. She notes for an hour or two after taking it, she feels \"badly\" and not like herself. She also reports it doesn't lower her blood pressure when she checks it after the losartan. Allergies   Allergen Reactions    Bactrim [Sulfamethoprim Ss] Nausea Only and Other (comments)     Blisters On  Lip     Ciprofloxacin Hives    Lisinopril Other (comments)     Felt bad       Current Outpatient Medications   Medication Sig    amLODIPine (NORVASC) 5 mg tablet Take 1 Tab by mouth two (2) times a day.  LORazepam (ATIVAN) 0.5 mg tablet Take 0.5 mg by mouth every eight (8) hours as needed.  hydrOXYzine HCL (ATARAX) 25 mg tablet 1-2 tablets PO TID PRN anxiety    metoprolol succinate (TOPROL-XL) 50 mg XL tablet Take 1 Tab by mouth daily.  pantoprazole (PROTONIX) 40 mg tablet     Omeprazole delayed release (PRILOSEC D/R) 20 mg tablet Take 1 Tab by mouth daily.     atorvastatin (LIPITOR) 40 mg tablet Take 1 Tab by mouth daily.   • fluticasone (FLONASE) 50 mcg/actuation nasal spray 2 sprays in each nostril once daily PRN   • Cholecalciferol, Vitamin D3, (VITAMIN D3) 1,000 unit cap Take 2 Caps by mouth daily.   • aspirin delayed-release 81 mg tablet Take  by mouth daily.     No current facility-administered medications for this visit.        Past Medical History:   Diagnosis Date   • Edema    • Hypercholesterolemia    • Hypertension    • Insomnia, unspecified    • Osteopenia    • Prediabetes    • Unspecified vitamin D deficiency        Family History   Problem Relation Age of Onset   • Hypertension Mother    • Diabetes Mother    • Hypertension Father    • Kidney Disease Father    • Diabetes Sister    • Diabetes Brother        ROS:  Denies fever, chills, cough, chest pain, SOB,  nausea, vomiting, diarrhea, dysuria. Denies rashes, wounds, arthralgias, weakness, numbness, visual changes, depression.  Denies wt loss, wt gain, hemoptysis, hematochezia or melena.    Patient is not experiencing chest pain radiating to the jaw and/or down the arms.    Physical Examination:    BP (!) 170/80 (BP 1 Location: Right arm, BP Patient Position: Sitting, BP Cuff Size: Adult)   Pulse 63   Temp 97.7 °F (36.5 °C) (Temporal)   Resp 17   Ht 5' 2.5\" (1.588 m)   SpO2 98%   BMI 30.96 kg/m²     Wt Readings from Last 3 Encounters:   03/02/21 172 lb (78 kg)   02/25/21 172 lb 6.4 oz (78.2 kg)   02/04/21 172 lb 9.6 oz (78.3 kg)     Constitutional: WDWN Female in no acute distress  HENT:  NC/AT  EYES: EOMI, PERRL  Neck:  Supple, no JVD, mass or bruit. No thyromegaly.  Respiratory:  Respirations even and unlabored without use of accessory muscles, CTA throughout without wheezes, rales, rubs or rhonchi. Symmetrical chest expansion.  Cardiac: RRR no clicks, murmurs. No ectopic beats, no tachycardia.  Musculoskeletal:  No cyanosis, clubbing or edema of extremities. Moves all extremities without difficulty.   Neurologic:  Smooth, even gait  without assistance, CN 2-12 grossly intact. Skin: intact and warm to the touch, no rash   Lymphadenopathy: no cervical or supraclavicular nodes  Psych: Pleasant and appropriate. Judgment normal. Alert and oriented x 3. Does not appear anxious at present. ASSESSMENT AND PLAN:       ICD-10-CM ICD-9-CM    1. Essential hypertension  I10 401.9 amLODIPine (NORVASC) 5 mg tablet      Home readings continue to be at goal but in the office, she is elevated. Stop losartan and add amlodipine 5 mg QHS. This is a trial. I do not think amlodipine and metoprolol will be enough to lower BP but she is experiencing vague s/e from the losartan. Ok to hold x 1 week and will increase amlodipine. Anticipate starting HCTZ if necessary at f/u. Discussed when to seek care in the ER (severe HA, CP) and when to come in to the office. Written instructions provided. Patient aware of plan of care and verbalized understanding. Questions answered. RTC 1 week for follow up, or sooner if needed.     Samuel Watkins, NP

## 2021-03-11 NOTE — PATIENT INSTRUCTIONS
The new BP plan is this: 
 
Amlodipine 5 mg and metoprolol 50 mg in the morning Amlodipine 5 mg at night Ok to temporarily stop your losartan for now. Continue checking BP at home. The goal is less than 140/80. If you have a high reading, ok to take a dose of the losartan to get it down. I want to see you back in 1 week.

## 2021-03-18 ENCOUNTER — TELEPHONE (OUTPATIENT)
Dept: FAMILY MEDICINE CLINIC | Age: 81
End: 2021-03-18

## 2021-03-18 NOTE — TELEPHONE ENCOUNTER
What are her blood pressures? Any chest pain? Had she taken any of her lorazepam when this happened? Please move up her appt to tomorrow or send to the ER if emergent. Thank you.

## 2021-03-18 NOTE — TELEPHONE ENCOUNTER
----- Message from Eduardo Aquino sent at 3/18/2021  1:40 PM EDT -----  Regarding: Diaz Mahmood/Telephone  General Message/Vendor Calls    Caller's first and last name:n/a      Reason for call: reporting she had dizziness.  loss balance with walking today for time ever        Callback required yes/no and why: yes      Best contact number(s): [phone]      Details to clarify the request:n/a      Eduardo Aquino

## 2021-03-18 NOTE — TELEPHONE ENCOUNTER
Patient advised, moved appointment up to tomorrow. Advised patient if things did get worse that she needed to go to the [facility].

## 2021-03-19 ENCOUNTER — OFFICE VISIT (OUTPATIENT)
Dept: FAMILY MEDICINE CLINIC | Age: 81
End: 2021-03-19
Payer: MEDICARE

## 2021-03-19 VITALS
OXYGEN SATURATION: 99 % | WEIGHT: 177 LBS | HEART RATE: 72 BPM | SYSTOLIC BLOOD PRESSURE: 170 MMHG | TEMPERATURE: 98.2 F | BODY MASS INDEX: 31.36 KG/M2 | HEIGHT: 63 IN | RESPIRATION RATE: 17 BRPM | DIASTOLIC BLOOD PRESSURE: 72 MMHG

## 2021-03-19 DIAGNOSIS — I10 ESSENTIAL HYPERTENSION: Primary | ICD-10-CM

## 2021-03-19 DIAGNOSIS — R74.8 ELEVATED VITAMIN B12 LEVEL: ICD-10-CM

## 2021-03-19 DIAGNOSIS — F41.9 ANXIETY: ICD-10-CM

## 2021-03-19 DIAGNOSIS — I49.3 PVC'S (PREMATURE VENTRICULAR CONTRACTIONS): ICD-10-CM

## 2021-03-19 DIAGNOSIS — R68.89 OTHER GENERAL SYMPTOMS AND SIGNS: ICD-10-CM

## 2021-03-19 PROCEDURE — 99214 OFFICE O/P EST MOD 30 MIN: CPT | Performed by: NURSE PRACTITIONER

## 2021-03-19 PROCEDURE — 93000 ELECTROCARDIOGRAM COMPLETE: CPT | Performed by: NURSE PRACTITIONER

## 2021-03-19 PROCEDURE — 36415 COLL VENOUS BLD VENIPUNCTURE: CPT | Performed by: NURSE PRACTITIONER

## 2021-03-19 RX ORDER — AMLODIPINE BESYLATE 5 MG/1
5 TABLET ORAL DAILY
Qty: 90 TAB | Refills: 3
Start: 2021-03-19 | End: 2021-07-29 | Stop reason: ALTCHOICE

## 2021-03-19 NOTE — PATIENT INSTRUCTIONS
High Blood Pressure: Care Instructions Overview It's normal for blood pressure to go up and down throughout the day. But if it stays up, you have high blood pressure. Another name for high blood pressure is hypertension. Despite what a lot of people think, high blood pressure usually doesn't cause headaches or make you feel dizzy or lightheaded. It usually has no symptoms. But it does increase your risk of stroke, heart attack, and other problems. You and your doctor will talk about your risks of these problems based on your blood pressure. Your doctor will give you a goal for your blood pressure. Your goal will be based on your health and your age. Lifestyle changes, such as eating healthy and being active, are always important to help lower blood pressure. You might also take medicine to reach your blood pressure goal. 
Follow-up care is a key part of your treatment and safety. Be sure to make and go to all appointments, and call your doctor if you are having problems. It's also a good idea to know your test results and keep a list of the medicines you take. How can you care for yourself at home? Medical treatment · If you stop taking your medicine, your blood pressure will go back up. You may take one or more types of medicine to lower your blood pressure. Be safe with medicines. Take your medicine exactly as prescribed. Call your doctor if you think you are having a problem with your medicine. · Talk to your doctor before you start taking aspirin every day. Aspirin can help certain people lower their risk of a heart attack or stroke. But taking aspirin isn't right for everyone, because it can cause serious bleeding. · See your doctor regularly. You may need to see the doctor more often at first or until your blood pressure comes down. · If you are taking blood pressure medicine, talk to your doctor before you take decongestants or anti-inflammatory medicine, such as ibuprofen.  Some of these medicines can raise blood pressure. · Learn how to check your blood pressure at home. Lifestyle changes · Stay at a healthy weight. This is especially important if you put on weight around the waist. Losing even 10 pounds can help you lower your blood pressure. · If your doctor recommends it, get more exercise. Walking is a good choice. Bit by bit, increase the amount you walk every day. Try for at least 30 minutes on most days of the week. You also may want to swim, bike, or do other activities. · Avoid or limit alcohol. Talk to your doctor about whether you can drink any alcohol. · Try to limit how much sodium you eat to less than 2,300 milligrams (mg) a day. Your doctor may ask you to try to eat less than 1,500 mg a day. · Eat plenty of fruits (such as bananas and oranges), vegetables, legumes, whole grains, and low-fat dairy products. · Lower the amount of saturated fat in your diet. Saturated fat is found in animal products such as milk, cheese, and meat. Limiting these foods may help you lose weight and also lower your risk for heart disease. · Do not smoke. Smoking increases your risk for heart attack and stroke. If you need help quitting, talk to your doctor about stop-smoking programs and medicines. These can increase your chances of quitting for good. When should you call for help? Call  911 anytime you think you may need emergency care. This may mean having symptoms that suggest that your blood pressure is causing a serious heart or blood vessel problem. Your blood pressure may be over 180/120. For example, call 911 if: 
  · You have symptoms of a heart attack. These may include: 
? Chest pain or pressure, or a strange feeling in the chest. 
? Sweating. ? Shortness of breath. ? Nausea or vomiting. ? Pain, pressure, or a strange feeling in the back, neck, jaw, or upper belly or in one or both shoulders or arms. ? Lightheadedness or sudden weakness.  
? A fast or irregular heartbeat.  
  · You have symptoms of a stroke. These may include: 
? Sudden numbness, tingling, weakness, or loss of movement in your face, arm, or leg, especially on only one side of your body. ? Sudden vision changes. ? Sudden trouble speaking. ? Sudden confusion or trouble understanding simple statements. ? Sudden problems with walking or balance. ? A sudden, severe headache that is different from past headaches.  
  · You have severe back or belly pain. Do not wait until your blood pressure comes down on its own. Get help right away. Call your doctor now or seek immediate care if: 
  · Your blood pressure is much higher than normal (such as 180/120 or higher), but you don't have symptoms.  
  · You think high blood pressure is causing symptoms, such as: 
? Severe headache. 
? Blurry vision. Watch closely for changes in your health, and be sure to contact your doctor if: 
  · Your blood pressure measures higher than your doctor recommends at least 2 times. That means the top number is higher or the bottom number is higher, or both.  
  · You think you may be having side effects from your blood pressure medicine. Where can you learn more? Go to http://www.gray.com/ Enter O800 in the search box to learn more about \"High Blood Pressure: Care Instructions. \" Current as of: December 16, 2019               Content Version: 12.6 © 5546-2868 [employer]. Care instructions adapted under license by [employer] (which disclaims liability or warranty for this information). If you have questions about a medical condition or this instruction, always ask your healthcare professional. [place] [address] any warranty or liability for your use of this information.

## 2021-03-19 NOTE — PROGRESS NOTES
Chief Complaint   Patient presents with    Elevated Blood Pressure       HPI:    Robby Pittman is a 68 y.o. female for an acute visit. R Knee pain - tolerable. No longer seeing ortho, unable to go to PT due to time issues. 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     Anxiety/depression - Symptoms have improved. She is still grieving the death of her sister and she also lost her brother in February 2020. Reports strong social support. Using Vistaril PRN.     Hyponatremia - Stable, no longer on sodium supplements. Hypertension - On metoprolol QAM, amlodipine 5 mg BID. Denies CP, weakness. Home BPs over the past week are as follows: 132/59, HR 67; 129/56 HR 67; 130/60 HR 68; 125/52 HR 73; 120/52 HR 72, 120/52 HR 67; 108/57 HR 78.     New issues: Seen today after experiencing some lightheadedness when sitting down to do some bill pay yesterday afternoon around 1:00. Her BP yesterday morning was 124/63. She stood up to walk to the phone and she felt dizzy. Symptoms lasted for a few seconds. When she reached the phone, symptoms had subsided. Her BP was 132/65 and HR 61 immediately after symptom resolution. Ms. Misa Perez has been closely followed in the office for new onset labile BPs that started in January. Her home log today looks good. BP is elevated in the office. She denies anxiety, chest pain. Hx of palpitations, PVCs. She has not seen a cardiologist in years. Allergies   Allergen Reactions    Bactrim [Sulfamethoprim Ss] Nausea Only and Other (comments)     Blisters On  Lip     Ciprofloxacin Hives    Lisinopril Other (comments)     Felt bad       Current Outpatient Medications   Medication Sig    amLODIPine (NORVASC) 5 mg tablet Take 1 Tab by mouth daily.  hydrOXYzine HCL (ATARAX) 25 mg tablet 1-2 tablets PO TID PRN anxiety    metoprolol succinate (TOPROL-XL) 50 mg XL tablet Take 1 Tab by mouth daily.     pantoprazole (PROTONIX) 40 mg tablet     Omeprazole delayed release (PRILOSEC D/R) 20 mg tablet Take 1 Tab by mouth daily.  atorvastatin (LIPITOR) 40 mg tablet Take 1 Tab by mouth daily.  fluticasone (FLONASE) 50 mcg/actuation nasal spray 2 sprays in each nostril once daily PRN    Cholecalciferol, Vitamin D3, (VITAMIN D3) 1,000 unit cap Take 2 Caps by mouth daily.  aspirin delayed-release 81 mg tablet Take  by mouth daily.  LORazepam (ATIVAN) 0.5 mg tablet Take 0.5 mg by mouth every eight (8) hours as needed. No current facility-administered medications for this visit. Past Medical History:   Diagnosis Date    Edema     Hypercholesterolemia     Hypertension     Insomnia, unspecified     Osteopenia     Prediabetes     Unspecified vitamin D deficiency        Family History   Problem Relation Age of Onset    Hypertension Mother     Diabetes Mother     Hypertension Father     Kidney Disease Father     Diabetes Sister     Diabetes Brother        ROS:  Denies fever, chills, cough, chest pain, SOB,  nausea, vomiting, diarrhea, dysuria. Denies rashes, wounds, arthralgias, + weakness, numbness, visual changes, depression. Denies wt loss, + wt gain, hemoptysis, hematochezia or melena. Patient is not experiencing chest pain radiating to the jaw and/or down the arms. Physical Examination:    BP (!) 170/72 (BP 1 Location: Right arm, BP Patient Position: Sitting, BP Cuff Size: Adult)   Pulse 72   Temp 98.2 °F (36.8 °C) (Temporal)   Resp 17   Ht 5' 2.5\" (1.588 m)   Wt 177 lb (80.3 kg)   SpO2 99%   BMI 31.86 kg/m²     Wt Readings from Last 3 Encounters:   03/19/21 177 lb (80.3 kg)   03/02/21 172 lb (78 kg)   02/25/21 172 lb 6.4 oz (78.2 kg)     Constitutional: WDWN Female in no acute distress  HENT:  NC/AT  EYES: EOMI, PERRL  Neck:  Supple, no JVD, mass or bruit. No thyromegaly. Respiratory:  Respirations even and unlabored without use of accessory muscles, CTA throughout without wheezes, rales, rubs or rhonchi.  Symmetrical chest expansion. Cardiac: RRR no clicks, murmurs. + ectopic beats  Musculoskeletal:  No cyanosis, clubbing or edema of extremities. Moves all extremities without difficulty. Neurologic:  Smooth, even gait without assistance, CN 2-12 grossly intact. Skin: intact and warm to the touch, no rash   Lymphadenopathy: no cervical or supraclavicular nodes  Psych: Pleasant and appropriate. Judgment normal. Alert and oriented x 3. Does not appear anxious at present. EKG: normal EKG, normal sinus rhythm, there are no previous tracings available for comparison. ASSESSMENT AND PLAN:       ICD-10-CM ICD-9-CM    1. Essential hypertension  I10 401.9 AMB POC EKG ROUTINE W/ 12 LEADS, INTER & REP      COLLECTION VENOUS BLOOD,VENIPUNCTURE      CBC WITH AUTOMATED DIFF      METABOLIC PANEL, COMPREHENSIVE      REFERRAL TO CARDIOLOGY      METABOLIC PANEL, COMPREHENSIVE      CBC WITH AUTOMATED DIFF      COLLECTION VENOUS BLOOD,VENIPUNCTURE      amLODIPine (NORVASC) 5 mg tablet   2. Anxiety  F41.9 300.00 TSH 3RD GENERATION      T4, FREE      T4, FREE      TSH 3RD GENERATION   3. Elevated vitamin B12 level  R74.8 790.99 VITAMIN B12      VITAMIN B12   4. PVC's (premature ventricular contractions)  I49.3 427.69 AMB POC EKG ROUTINE W/ 12 LEADS, INTER & REP      REFERRAL TO CARDIOLOGY   5. Other general symptoms and signs   R68.89 780.99 VITAMIN B12      VITAMIN B12      Checked her BP on her home cuff here in the office 166/74. Checked BP manually 164/70. Home cuff is accurate. Home BP log with borderline low diastolics. This, coupled with the lightheadedness yesterday, leads me to decrease her amlodipine back to just once a day in the evenings. Continue close home monitoring. Refer to Dr. Anthony Rai, cardiologist, for an evaluation. Check labs today. If dizziness, chest pain, SOB, weakness occurs again, call 911. Patient aware of plan of care and verbalized understanding. Questions answered.  RTC 2 weeks for follow up, or sooner if needed.     Teresita Jackson, NP

## 2021-03-20 LAB
ALBUMIN SERPL-MCNC: 4.4 G/DL (ref 3.5–5)
ALBUMIN/GLOB SERPL: 1.3 {RATIO} (ref 1.1–2.2)
ALP SERPL-CCNC: 108 U/L (ref 45–117)
ALT SERPL-CCNC: 28 U/L (ref 12–78)
ANION GAP SERPL CALC-SCNC: 7 MMOL/L (ref 5–15)
AST SERPL-CCNC: 21 U/L (ref 15–37)
BASOPHILS # BLD: 0.1 K/UL (ref 0–0.1)
BASOPHILS NFR BLD: 1 % (ref 0–1)
BILIRUB SERPL-MCNC: 0.5 MG/DL (ref 0.2–1)
BUN SERPL-MCNC: 20 MG/DL (ref 6–20)
BUN/CREAT SERPL: 17 (ref 12–20)
CALCIUM SERPL-MCNC: 9.6 MG/DL (ref 8.5–10.1)
CHLORIDE SERPL-SCNC: 104 MMOL/L (ref 97–108)
CO2 SERPL-SCNC: 25 MMOL/L (ref 21–32)
CREAT SERPL-MCNC: 1.18 MG/DL (ref 0.55–1.02)
DIFFERENTIAL METHOD BLD: NORMAL
EOSINOPHIL # BLD: 0.3 K/UL (ref 0–0.4)
EOSINOPHIL NFR BLD: 4 % (ref 0–7)
ERYTHROCYTE [DISTWIDTH] IN BLOOD BY AUTOMATED COUNT: 12.5 % (ref 11.5–14.5)
GLOBULIN SER CALC-MCNC: 3.5 G/DL (ref 2–4)
GLUCOSE SERPL-MCNC: 91 MG/DL (ref 65–100)
HCT VFR BLD AUTO: 37 % (ref 35–47)
HGB BLD-MCNC: 12 G/DL (ref 11.5–16)
IMM GRANULOCYTES # BLD AUTO: 0 K/UL (ref 0–0.04)
IMM GRANULOCYTES NFR BLD AUTO: 0 % (ref 0–0.5)
LYMPHOCYTES # BLD: 2.2 K/UL (ref 0.8–3.5)
LYMPHOCYTES NFR BLD: 30 % (ref 12–49)
MCH RBC QN AUTO: 29.9 PG (ref 26–34)
MCHC RBC AUTO-ENTMCNC: 32.4 G/DL (ref 30–36.5)
MCV RBC AUTO: 92.3 FL (ref 80–99)
MONOCYTES # BLD: 0.7 K/UL (ref 0–1)
MONOCYTES NFR BLD: 10 % (ref 5–13)
NEUTS SEG # BLD: 4.2 K/UL (ref 1.8–8)
NEUTS SEG NFR BLD: 55 % (ref 32–75)
NRBC # BLD: 0 K/UL (ref 0–0.01)
NRBC BLD-RTO: 0 PER 100 WBC
PLATELET # BLD AUTO: 264 K/UL (ref 150–400)
PMV BLD AUTO: 10.4 FL (ref 8.9–12.9)
POTASSIUM SERPL-SCNC: 5.2 MMOL/L (ref 3.5–5.1)
PROT SERPL-MCNC: 7.9 G/DL (ref 6.4–8.2)
RBC # BLD AUTO: 4.01 M/UL (ref 3.8–5.2)
SODIUM SERPL-SCNC: 136 MMOL/L (ref 136–145)
T4 FREE SERPL-MCNC: 0.9 NG/DL (ref 0.8–1.5)
TSH SERPL DL<=0.05 MIU/L-ACNC: 2.21 UIU/ML (ref 0.36–3.74)
VIT B12 SERPL-MCNC: >2000 PG/ML (ref 193–986)
WBC # BLD AUTO: 7.5 K/UL (ref 3.6–11)

## 2021-03-22 NOTE — PROGRESS NOTES
Please call, labs look good. Her B12 level is too high. Make sure she is not taking any B12 supplements or a multivitamin that contains B12. Thyroid function was normal. Sodium was good. No anemia.

## 2021-04-27 ENCOUNTER — OFFICE VISIT (OUTPATIENT)
Dept: FAMILY MEDICINE CLINIC | Age: 81
End: 2021-04-27
Payer: MEDICARE

## 2021-04-27 VITALS
DIASTOLIC BLOOD PRESSURE: 70 MMHG | RESPIRATION RATE: 17 BRPM | WEIGHT: 176 LBS | BODY MASS INDEX: 31.18 KG/M2 | TEMPERATURE: 98.1 F | HEIGHT: 63 IN | SYSTOLIC BLOOD PRESSURE: 178 MMHG | HEART RATE: 56 BPM | OXYGEN SATURATION: 98 %

## 2021-04-27 DIAGNOSIS — M25.471 ANKLE EDEMA, BILATERAL: ICD-10-CM

## 2021-04-27 DIAGNOSIS — M25.472 ANKLE EDEMA, BILATERAL: ICD-10-CM

## 2021-04-27 DIAGNOSIS — I10 ESSENTIAL HYPERTENSION: Primary | ICD-10-CM

## 2021-04-27 PROCEDURE — 99214 OFFICE O/P EST MOD 30 MIN: CPT | Performed by: NURSE PRACTITIONER

## 2021-04-27 RX ORDER — VALSARTAN 40 MG/1
40 TABLET ORAL DAILY
Qty: 30 TAB | Refills: 0 | Status: SHIPPED | OUTPATIENT
Start: 2021-04-27 | End: 2021-05-27 | Stop reason: SDUPTHER

## 2021-04-27 NOTE — PROGRESS NOTES
Chief Complaint   Patient presents with    Ankle swelling       HPI:    Melly Mcgowan is a 68 y.o. female for an acute visit. R Knee pain - tolerable. No longer seeing ortho, unable to go to PT due to time issues. 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     Anxiety/depression - Symptoms have improved. She is still grieving the death of her sister and she also lost her brother in February 2020. Reports strong social support. Using Vistaril PRN.     Hyponatremia - Stable, no longer on sodium supplements. Hypertension - On metoprolol QAM, amlodipine 5 mg BID. Denies CP, weakness. Saw Dr. Linden Rosenberg who increased the amlodipine to BID dosing and has ordered an echo, cardiac stress test, venous insufficiency study, and duplex, which are scheduled for May and June. Home BP was 400 systolic this morning.      New issues: Seen today with a CC of bilateral lateral ankle swelling, worse as the day goes on. Swelling started after amlodipine was increased to BID dosing about 3 weeks ago. Allergies   Allergen Reactions    Bactrim [Sulfamethoprim Ss] Nausea Only and Other (comments)     Blisters On  Lip     Ciprofloxacin Hives    Lisinopril Other (comments)     Felt bad       Current Outpatient Medications   Medication Sig    valsartan (DIOVAN) 40 mg tablet Take 1 Tab by mouth daily. At bedtime    pantoprazole (PROTONIX) 40 mg tablet TAKE 1 TABLET DAILY    atorvastatin (LIPITOR) 40 mg tablet TAKE 1 TABLET DAILY        (DISCONTINUE SIMVASTATIN)    amLODIPine (NORVASC) 5 mg tablet Take 1 Tab by mouth daily.  LORazepam (ATIVAN) 0.5 mg tablet Take 0.5 mg by mouth every eight (8) hours as needed.  hydrOXYzine HCL (ATARAX) 25 mg tablet 1-2 tablets PO TID PRN anxiety    metoprolol succinate (TOPROL-XL) 50 mg XL tablet Take 1 Tab by mouth daily.  Omeprazole delayed release (PRILOSEC D/R) 20 mg tablet Take 1 Tab by mouth daily.     fluticasone (FLONASE) 50 mcg/actuation nasal spray 2 sprays in each nostril once daily PRN    Cholecalciferol, Vitamin D3, (VITAMIN D3) 1,000 unit cap Take 2 Caps by mouth daily.  aspirin delayed-release 81 mg tablet Take  by mouth daily. No current facility-administered medications for this visit. Past Medical History:   Diagnosis Date    Edema     Hypercholesterolemia     Hypertension     Insomnia, unspecified     Osteopenia     Prediabetes     Unspecified vitamin D deficiency        Family History   Problem Relation Age of Onset    Hypertension Mother     Diabetes Mother     Hypertension Father     Kidney Disease Father     Diabetes Sister     Diabetes Brother        ROS:  Denies fever, chills, cough, chest pain, SOB,  nausea, vomiting, diarrhea, dysuria. Denies rashes, wounds, arthralgias, + weakness, numbness, visual changes, depression. Denies wt loss, wt gain, hemoptysis, hematochezia or melena. Patient is not experiencing chest pain radiating to the jaw and/or down the arms. Physical Examination:    BP (!) 178/70 (BP 1 Location: Left arm, BP Patient Position: Sitting, BP Cuff Size: Adult)   Pulse (!) 56   Temp 98.1 °F (36.7 °C) (Temporal)   Resp 17   Ht 5' 2.5\" (1.588 m)   Wt 176 lb (79.8 kg)   SpO2 98%   BMI 31.68 kg/m²     Wt Readings from Last 3 Encounters:   04/27/21 176 lb (79.8 kg)   03/19/21 177 lb (80.3 kg)   03/02/21 172 lb (78 kg)     Constitutional: WDWN Female in no acute distress  HENT:  NC/AT  EYES: EOMI, PERRL  Neck:  Supple, no JVD, mass or bruit. No thyromegaly. Respiratory:  Respirations even and unlabored without use of accessory muscles, CTA throughout without wheezes, rales, rubs or rhonchi. Symmetrical chest expansion. Cardiac: RRR no clicks, murmurs. Musculoskeletal:  No cyanosis, clubbing. Moves all extremities without difficulty. Ankles are non-tender, dorsiflexion, plantar flexion intact. Pitting edema around lateral malleolus bilaterally.   Neurologic:  Smooth, even gait without assistance, CN 2-12 grossly intact. Skin: intact and warm to the touch, no rash   Lymphadenopathy: no cervical or supraclavicular nodes  Psych: Pleasant and appropriate. Judgment normal. Alert and oriented x 3. Does not appear anxious at present. ASSESSMENT AND PLAN:       ICD-10-CM ICD-9-CM    1. Essential hypertension  I10 401.9 valsartan (DIOVAN) 40 mg tablet   2. Ankle edema, bilateral  M25.471 719.07     M25.472        Decreased amlodipine back to once a day. Add valsartan QPM, start at a low dose and anticipate titrating up. If dizziness, chest pain, SOB, weakness occurs again, call 911. Patient aware of plan of care and verbalized understanding. Questions answered. RTC 1 month for follow up BP, or sooner if needed.     Duane Whitaker, DALE

## 2021-05-27 ENCOUNTER — OFFICE VISIT (OUTPATIENT)
Dept: FAMILY MEDICINE CLINIC | Age: 81
End: 2021-05-27
Payer: MEDICARE

## 2021-05-27 VITALS
HEIGHT: 63 IN | SYSTOLIC BLOOD PRESSURE: 158 MMHG | RESPIRATION RATE: 17 BRPM | OXYGEN SATURATION: 97 % | TEMPERATURE: 97.8 F | WEIGHT: 175.6 LBS | BODY MASS INDEX: 31.11 KG/M2 | HEART RATE: 60 BPM | DIASTOLIC BLOOD PRESSURE: 60 MMHG

## 2021-05-27 DIAGNOSIS — I10 ESSENTIAL HYPERTENSION: ICD-10-CM

## 2021-05-27 DIAGNOSIS — Z00.00 MEDICARE ANNUAL WELLNESS VISIT, SUBSEQUENT: Primary | ICD-10-CM

## 2021-05-27 PROCEDURE — G0439 PPPS, SUBSEQ VISIT: HCPCS | Performed by: NURSE PRACTITIONER

## 2021-05-27 PROCEDURE — 99213 OFFICE O/P EST LOW 20 MIN: CPT | Performed by: NURSE PRACTITIONER

## 2021-05-27 RX ORDER — VALSARTAN 40 MG/1
40 TABLET ORAL DAILY
Qty: 30 TABLET | Refills: 0 | Status: SHIPPED | OUTPATIENT
Start: 2021-05-27 | End: 2021-06-24 | Stop reason: SDUPTHER

## 2021-05-27 NOTE — PROGRESS NOTES
Chief Complaint   Patient presents with    Annual Wellness Visit       HPI:    Alexis Anand is a 68 y.o. female for a follow up visit and AWV. R Knee pain - tolerable. No longer seeing ortho, unable to go to PT due to time issues. 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     Anxiety/depression - Symptoms have improved. She is still grieving the death of her sister and she also lost her brother in February 2020. Reports strong social support. Using Vistaril PRN.     Hyponatremia - Stable, no longer on sodium supplements. Hypertension - On metoprolol QAM, amlodipine 5 mg BID. Denies CP, weakness. Saw Dr. Pari Rader who increased the amlodipine to BID dosing and has ordered an echo, cardiac stress test, venous insufficiency study, and duplex. Per patient, echo, stress test were normal. Vascular studies are pending. She will see Dr. Pari Rader again at the end of June.      New issues: Seen last month with a CC of bilateral ankle swelling which worsened after increased dose of amlodipine. Amlodipine was decreased back down to 5 mg daily and valsartan was started QPM. Doing well. BP was 599 systolic. Swelling has improved slightly. Allergies   Allergen Reactions    Bactrim [Sulfamethoprim Ss] Nausea Only and Other (comments)     Blisters On  Lip     Ciprofloxacin Hives    Lisinopril Other (comments)     Felt bad       Current Outpatient Medications   Medication Sig    valsartan (DIOVAN) 40 mg tablet Take 1 Tablet by mouth daily. At bedtime    pantoprazole (PROTONIX) 40 mg tablet TAKE 1 TABLET DAILY    atorvastatin (LIPITOR) 40 mg tablet TAKE 1 TABLET DAILY        (DISCONTINUE SIMVASTATIN)    amLODIPine (NORVASC) 5 mg tablet Take 1 Tab by mouth daily.  LORazepam (ATIVAN) 0.5 mg tablet Take 0.5 mg by mouth every eight (8) hours as needed.     hydrOXYzine HCL (ATARAX) 25 mg tablet 1-2 tablets PO TID PRN anxiety    metoprolol succinate (TOPROL-XL) 50 mg XL tablet Take 1 Tab by mouth daily.    Omeprazole delayed release (PRILOSEC D/R) 20 mg tablet Take 1 Tab by mouth daily.  fluticasone (FLONASE) 50 mcg/actuation nasal spray 2 sprays in each nostril once daily PRN    Cholecalciferol, Vitamin D3, (VITAMIN D3) 1,000 unit cap Take 2 Caps by mouth daily.  aspirin delayed-release 81 mg tablet Take  by mouth daily. No current facility-administered medications for this visit. Past Medical History:   Diagnosis Date    Edema     Hypercholesterolemia     Hypertension     Insomnia, unspecified     Osteopenia     Prediabetes     Unspecified vitamin D deficiency        Family History   Problem Relation Age of Onset    Hypertension Mother     Diabetes Mother     Hypertension Father     Kidney Disease Father     Diabetes Sister     Diabetes Brother        ROS:  Denies fever, chills, cough, chest pain, SOB,  nausea, vomiting, diarrhea, dysuria. Denies rashes, wounds, arthralgias,  weakness, numbness, visual changes, depression. Denies wt loss, wt gain, hemoptysis, hematochezia or melena. Patient is not experiencing chest pain radiating to the jaw and/or down the arms. Physical Examination:    BP (!) 158/60 (BP 1 Location: Left arm, BP Patient Position: Sitting, BP Cuff Size: Adult)   Pulse 60   Temp 97.8 °F (36.6 °C) (Temporal)   Resp 17   Ht 5' 2.5\" (1.588 m)   Wt 175 lb 9.6 oz (79.7 kg)   SpO2 97%   BMI 31.61 kg/m²     Wt Readings from Last 3 Encounters:   05/27/21 175 lb 9.6 oz (79.7 kg)   04/27/21 176 lb (79.8 kg)   03/19/21 177 lb (80.3 kg)     Constitutional: WDWN Female in no acute distress  HENT:  NC/AT  EYES: EOMI, PERRL  Neck:  Supple, no JVD, mass or bruit. No thyromegaly. Respiratory:  Respirations even and unlabored without use of accessory muscles, CTA throughout without wheezes, rales, rubs or rhonchi. Symmetrical chest expansion. Cardiac: RRR no clicks, murmurs. Musculoskeletal:  No cyanosis, clubbing. Moves all extremities without difficulty.  No edema today. Neurologic:  Smooth, even gait without assistance, CN 2-12 grossly intact. Skin: intact and warm to the touch, no rash   Lymphadenopathy: no cervical or supraclavicular nodes  Psych: Pleasant and appropriate. Judgment normal. Alert and oriented x 3. Does not appear anxious at present. ASSESSMENT AND PLAN:       ICD-10-CM ICD-9-CM    1. Medicare annual wellness visit, subsequent  Z00.00 V70.0    2. Essential hypertension  I10 401.9 valsartan (DIOVAN) 40 mg tablet      BP sl elevated but normal at home, at cardiologist. No med changes for now. See cardiology as scheduled. Continue same. See below AWV. Patient aware of plan of care and verbalized understanding. Questions answered. RTC end of July for a check up, lab work, or sooner if needed.     Vero Cavazos NP

## 2021-05-27 NOTE — PROGRESS NOTES
This is the Subsequent Medicare Annual Wellness Exam, performed 12 months or more after the Initial AWV or the last Subsequent AWV    I have reviewed the patient's medical history in detail and updated the computerized patient record. Assessment/Plan   Education and counseling provided:  Are appropriate based on today's review and evaluation    1. Medicare annual wellness visit, subsequent  2. Essential hypertension  -     valsartan (DIOVAN) 40 mg tablet; Take 1 Tablet by mouth daily. At bedtime, Normal, Disp-30 Tablet, R-0       Depression Risk Factor Screening:     3 most recent PHQ Screens 5/27/2021   PHQ Not Done -   Little interest or pleasure in doing things Not at all   Feeling down, depressed, irritable, or hopeless Not at all   Total Score PHQ 2 0       Alcohol Risk Screen    Do you average more than 1 drink per night or more than 7 drinks a week:  No    On any one occasion in the past three months have you have had more than 3 drinks containing alcohol:  No        Functional Ability and Level of Safety:    Hearing: Hearing is good. Activities of Daily Living: The home contains: no safety equipment. Patient does total self care      Ambulation: with no difficulty     Fall Risk:  Fall Risk Assessment, last 12 mths 5/27/2021   Able to walk? Yes   Fall in past 12 months? 0   Do you feel unsteady? 0   Are you worried about falling 0   Number of falls in past 12 months -   Fall with injury?  -      Abuse Screen:  Patient is not abused       Cognitive Screening    Has your family/caregiver stated any concerns about your memory: no    Cognitive Screening: Normal - MMSE (Mini Mental Status Exam)    Health Maintenance Due     Health Maintenance Due   Topic Date Due    Hepatitis C Screening  Never done    Shingrix Vaccine Age 50> (1 of 2) Never done       Patient Care Team   Patient Care Team:  Ashley Zuñiga NP as PCP - General (Nurse Practitioner)  Ashley Zuñiga NP as PCP - Betzy Mcelroy Dr Empaneled Provider    History     Patient Active Problem List   Diagnosis Code    Hypertension I10    Hyperlipemia E78.5    Unspecified vitamin D deficiency E55.9    Pre-diabetes R73.03    Prediabetes R73.03    ACP (advance care planning) Z71.89    Hyponatremia E87.1     Past Medical History:   Diagnosis Date    Edema     Hypercholesterolemia     Hypertension     Insomnia, unspecified     Osteopenia     Prediabetes     Unspecified vitamin D deficiency       Past Surgical History:   Procedure Laterality Date    HX TUBAL LIGATION       Current Outpatient Medications   Medication Sig Dispense Refill    valsartan (DIOVAN) 40 mg tablet Take 1 Tablet by mouth daily. At bedtime 30 Tablet 0    pantoprazole (PROTONIX) 40 mg tablet TAKE 1 TABLET DAILY 90 Tab 3    atorvastatin (LIPITOR) 40 mg tablet TAKE 1 TABLET DAILY        (DISCONTINUE SIMVASTATIN) 90 Tab 3    amLODIPine (NORVASC) 5 mg tablet Take 1 Tab by mouth daily. 90 Tab 3    LORazepam (ATIVAN) 0.5 mg tablet Take 0.5 mg by mouth every eight (8) hours as needed.  hydrOXYzine HCL (ATARAX) 25 mg tablet 1-2 tablets PO TID PRN anxiety 30 Tab 0    metoprolol succinate (TOPROL-XL) 50 mg XL tablet Take 1 Tab by mouth daily. 90 Tab 3    Omeprazole delayed release (PRILOSEC D/R) 20 mg tablet Take 1 Tab by mouth daily. 90 Tab 3    fluticasone (FLONASE) 50 mcg/actuation nasal spray 2 sprays in each nostril once daily PRN 1 Bottle 0    Cholecalciferol, Vitamin D3, (VITAMIN D3) 1,000 unit cap Take 2 Caps by mouth daily.  aspirin delayed-release 81 mg tablet Take  by mouth daily.        Allergies   Allergen Reactions    Bactrim [Sulfamethoprim Ss] Nausea Only and Other (comments)     Blisters On  Lip     Ciprofloxacin Hives    Lisinopril Other (comments)     Felt bad       Family History   Problem Relation Age of Onset    Hypertension Mother     Diabetes Mother     Hypertension Father     Kidney Disease Father     Diabetes Sister     Diabetes Brother      Social History     Tobacco Use    Smoking status: Former Smoker     Years: 40.00    Smokeless tobacco: Never Used   Substance Use Topics    Alcohol use: No     Tait Chou

## 2021-05-27 NOTE — ACP (ADVANCE CARE PLANNING)
Discussed importance of advanced medical directives with patient. Patient is capable of making decisions. Declines forms.     Julia Thurman

## 2021-05-27 NOTE — PATIENT INSTRUCTIONS
The best way to stay healthy is to live a healthy lifestyle. A healthy lifestyle includes regular exercise, eating a well-balanced diet, keeping a healthy weight and not smoking. Regular physical exams and screening tests are another important way to take care of yourself. Preventive exams provided by health care providers can find health problems early when treatment works best and can keep you from getting certain diseases or illnesses. Preventive services include exams, lab tests, screenings, shots, monitoring and information to help you take care of your own health. All people over 65 should have a pneumonia shot. Pneumonia shots are usually only needed once in a lifetime unless your doctor decides differently. In addition to your physical exam, some screening tests are recommended: 
 
All people over 65 should have a yearly flu shot. People over 65 are at medium to high risk for Hepatitis B. Three shots are needed for complete protection. Bone mass measurement (dexa scan) is recommended every two years. Diabetes Mellitus screening is recommended every year. Glaucoma is an eye disease caused by high pressure in the eye. An eye exam is recommended every year. Cardiovascular screening tests that check your cholesterol and other blood fat (lipid) levels are recommended every five years. Colorectal Cancer screening tests help to find pre-cancerous polyps (growths in the colon) so they can be removed before they turn into cancer. Tests ordered for screening depend on your personal and family history risk factors. Prostate Cancer Screening (annually up to age 76) Screening for breast cancer is recommended yearly with a Mammogram. 
 
Screening for cervical and vaginal cancer is recommended with a pelvic and Pap test every two years.  However if you have had an abnormal pap in the past  three years or at high risk for cervical or vaginal cancer Medicare will cover a pap test and a pelvic exam every year. Here is a list of your current Health Maintenance items with a due date: 
Health Maintenance Due Topic Date Due  
 Hepatitis C Test  Never done  COVID-19 Vaccine (1) Never done  Shingles Vaccine (1 of 2) Never done [facility] Annual Well Visit  05/19/2021

## 2021-06-24 DIAGNOSIS — I10 ESSENTIAL HYPERTENSION: ICD-10-CM

## 2021-06-24 RX ORDER — VALSARTAN 40 MG/1
40 TABLET ORAL DAILY
Qty: 90 TABLET | Refills: 3 | Status: SHIPPED | OUTPATIENT
Start: 2021-06-24 | End: 2021-07-29 | Stop reason: SDUPTHER

## 2021-06-24 NOTE — TELEPHONE ENCOUNTER
Pt called requesting a refill on her Valsartan. She requested additional refills on the medication too.  Please send to [facility]don[facility]

## 2021-07-16 ENCOUNTER — TELEPHONE (OUTPATIENT)
Dept: FAMILY MEDICINE CLINIC | Age: 81
End: 2021-07-16

## 2021-07-16 DIAGNOSIS — I10 ESSENTIAL HYPERTENSION: ICD-10-CM

## 2021-07-16 RX ORDER — METOPROLOL SUCCINATE 50 MG/1
25 TABLET, EXTENDED RELEASE ORAL DAILY
Qty: 90 TABLET | Refills: 3
Start: 2021-07-16 | End: 2022-01-04 | Stop reason: SDUPTHER

## 2021-07-16 NOTE — TELEPHONE ENCOUNTER
Sp/w pt confirmed the 3 medications. Advised her to stop the amlodipine 5mg. Pt agreed. I told her if she's still having sx on Monday to give us a call back. Pt agreed.  KT

## 2021-07-16 NOTE — TELEPHONE ENCOUNTER
Sp/w pt, she states that she has periods of \"feeling faint\" for the past 2-3 days. Her blood pressure readings have been:  97/52 Pulse 59  109/56 Pulse 65  128/48 Pulse 66  129/54 Pulse 56    She states that at her last cardiology visit about a month ago they cut her Metoprolol in half. I talked with her about dehydration, with the heat indexes being over 100. She states that her mouth has been dry but no changes in her urine that she can tell.

## 2021-07-16 NOTE — TELEPHONE ENCOUNTER
So she is on metoprolol 25 mg, amlodipine 5 mg, and valsartan 40 mg. Is this correct? If so, stop the amlodipine.

## 2021-07-29 ENCOUNTER — OFFICE VISIT (OUTPATIENT)
Dept: FAMILY MEDICINE CLINIC | Age: 81
End: 2021-07-29
Payer: MEDICARE

## 2021-07-29 VITALS
WEIGHT: 175.6 LBS | SYSTOLIC BLOOD PRESSURE: 135 MMHG | DIASTOLIC BLOOD PRESSURE: 78 MMHG | TEMPERATURE: 97.6 F | OXYGEN SATURATION: 97 % | BODY MASS INDEX: 31.11 KG/M2 | RESPIRATION RATE: 17 BRPM | HEIGHT: 63 IN | HEART RATE: 62 BPM

## 2021-07-29 DIAGNOSIS — E78.5 HYPERLIPIDEMIA, UNSPECIFIED HYPERLIPIDEMIA TYPE: ICD-10-CM

## 2021-07-29 DIAGNOSIS — Z11.59 SCREENING FOR VIRAL DISEASE: ICD-10-CM

## 2021-07-29 DIAGNOSIS — K21.9 GASTROESOPHAGEAL REFLUX DISEASE WITHOUT ESOPHAGITIS: ICD-10-CM

## 2021-07-29 DIAGNOSIS — I10 ESSENTIAL HYPERTENSION: Primary | ICD-10-CM

## 2021-07-29 DIAGNOSIS — E55.9 VITAMIN D DEFICIENCY: ICD-10-CM

## 2021-07-29 PROCEDURE — 99214 OFFICE O/P EST MOD 30 MIN: CPT | Performed by: NURSE PRACTITIONER

## 2021-07-29 PROCEDURE — 36415 COLL VENOUS BLD VENIPUNCTURE: CPT | Performed by: NURSE PRACTITIONER

## 2021-07-29 RX ORDER — VALSARTAN 40 MG/1
40 TABLET ORAL DAILY
Qty: 90 TABLET | Refills: 3 | Status: SHIPPED | OUTPATIENT
Start: 2021-07-29 | End: 2022-01-18 | Stop reason: SDUPTHER

## 2021-07-29 RX ORDER — PANTOPRAZOLE SODIUM 40 MG/1
TABLET, DELAYED RELEASE ORAL
Qty: 90 TABLET | Refills: 3 | Status: SHIPPED | OUTPATIENT
Start: 2021-07-29 | End: 2022-06-07

## 2021-07-29 NOTE — PROGRESS NOTES
Chief Complaint   Patient presents with    Medication Evaluation       HPI:    Hawk Gibbs is a 80 y.o. female for a follow up visit. R Knee pain - tolerable. No longer seeing ortho, unable to go to PT due to time issues. 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     Anxiety/depression - Stable.     Hyponatremia - Stable, no longer on sodium supplements. Hypertension - On metoprolol QAM, valsartan QPM. Denies CP, weakness. Saw Dr. Siddharth Albert who increased the amlodipine to BID dosing and has ordered an echo, cardiac stress test, venous insufficiency study, and duplex. Testing ok, will see him again September 16. Venous insufficiency - follows with vascular through [facility]. Next appt in October. Currently wearing compression stockings.     New issues: Off of amlodipine after she experienced symptomatic hypotension. She feels much improved! Energy is better, denies lightheadedness. Due for her check up labs. Allergies   Allergen Reactions    Bactrim [Sulfamethoprim Ss] Nausea Only and Other (comments)     Blisters On  Lip     Ciprofloxacin Hives    Lisinopril Other (comments)     Felt bad       Current Outpatient Medications   Medication Sig    pantoprazole (PROTONIX) 40 mg tablet TAKE 1 TABLET DAILY    valsartan (DIOVAN) 40 mg tablet Take 1 Tablet by mouth daily. At bedtime    metoprolol succinate (TOPROL-XL) 50 mg XL tablet Take 0.5 Tablets by mouth daily.  atorvastatin (LIPITOR) 40 mg tablet TAKE 1 TABLET DAILY        (DISCONTINUE SIMVASTATIN)    LORazepam (ATIVAN) 0.5 mg tablet Take 0.5 mg by mouth every eight (8) hours as needed.  hydrOXYzine HCL (ATARAX) 25 mg tablet 1-2 tablets PO TID PRN anxiety    fluticasone (FLONASE) 50 mcg/actuation nasal spray 2 sprays in each nostril once daily PRN    Cholecalciferol, Vitamin D3, (VITAMIN D3) 1,000 unit cap Take 2 Caps by mouth daily.  aspirin delayed-release 81 mg tablet Take  by mouth daily.      No current facility-administered medications for this visit. Past Medical History:   Diagnosis Date    Edema     Hypercholesterolemia     Hypertension     Insomnia, unspecified     Osteopenia     Prediabetes     Unspecified vitamin D deficiency        Family History   Problem Relation Age of Onset    Hypertension Mother     Diabetes Mother     Hypertension Father     Kidney Disease Father     Diabetes Sister     Diabetes Brother        ROS:  Denies fever, chills, cough, chest pain, SOB,  nausea, vomiting, diarrhea, dysuria. Denies rashes, wounds, arthralgias,  weakness, numbness, visual changes, depression. Denies wt loss, wt gain, hemoptysis, hematochezia or melena. Patient is not experiencing chest pain radiating to the jaw and/or down the arms. Physical Examination:    /78 (BP 1 Location: Right arm, BP Patient Position: Sitting, BP Cuff Size: Adult)   Pulse 62   Temp 97.6 °F (36.4 °C) (Temporal)   Resp 17   Ht 5' 2.5\" (1.588 m)   Wt 175 lb 9.6 oz (79.7 kg)   SpO2 97%   BMI 31.61 kg/m²     Wt Readings from Last 3 Encounters:   07/29/21 175 lb 9.6 oz (79.7 kg)   05/27/21 175 lb 9.6 oz (79.7 kg)   04/27/21 176 lb (79.8 kg)     Constitutional: WDWN Female in no acute distress  HENT:  NC/AT  EYES: EOMI, PERRL  Neck:  Supple, no JVD, mass or bruit. No thyromegaly. Respiratory:  Respirations even and unlabored without use of accessory muscles, CTA throughout without wheezes, rales, rubs or rhonchi. Symmetrical chest expansion. Cardiac: RRR no clicks, murmurs. Musculoskeletal:  No cyanosis, clubbing. Moves all extremities without difficulty. No edema. Neurologic:  Smooth, even gait without assistance, CN 2-12 grossly intact. Skin: intact and warm to the touch, no rash   Lymphadenopathy: no cervical or supraclavicular nodes  Psych: Pleasant and appropriate. Judgment normal. Alert and oriented x 3. Does not appear anxious at present. ASSESSMENT AND PLAN:       ICD-10-CM ICD-9-CM    1. Essential hypertension  I10 401.9 COLLECTION VENOUS BLOOD,VENIPUNCTURE      CBC WITH AUTOMATED DIFF      LIPID PANEL      METABOLIC PANEL, COMPREHENSIVE      TSH 3RD GENERATION      valsartan (DIOVAN) 40 mg tablet   2. Hyperlipidemia, unspecified hyperlipidemia type  E78.5 272.4 COLLECTION VENOUS BLOOD,VENIPUNCTURE      CBC WITH AUTOMATED DIFF      LIPID PANEL      METABOLIC PANEL, COMPREHENSIVE      TSH 3RD GENERATION   3. Screening for viral disease  Z11.59 V73.99 COLLECTION VENOUS BLOOD,VENIPUNCTURE      HCV AB W/RFLX TO FELICITA   4. Gastroesophageal reflux disease without esophagitis  K21.9 530.81 pantoprazole (PROTONIX) 40 mg tablet   5. Vitamin D deficiency  E55.9 268.9 COLLECTION VENOUS BLOOD,VENIPUNCTURE      VITAMIN D, 25 HYDROXY        BP improved, Check chronic labs. Update HM with Hep C screening. Reviewed available labs from cardiology. Patient aware of plan of care and verbalized understanding. Questions answered. RTC in December, or sooner if needed.     Kristel Conti, DALE

## 2021-07-29 NOTE — PATIENT INSTRUCTIONS
Learning About High Blood Pressure  What is high blood pressure? Blood pressure is a measure of how hard the blood pushes against the walls of your arteries. It's normal for blood pressure to go up and down throughout the day. But if it stays up, you have high blood pressure. Another name for high blood pressure is hypertension. Two numbers tell you your blood pressure. The first number is the systolic pressure (top number). It shows how hard the blood pushes when your heart is pumping. The second number is the diastolic pressure (bottom number). It shows how hard the blood pushes between heartbeats, when your heart is relaxed and filling with blood. Your doctor will give you a goal for your blood pressure based on your health and your age. High blood pressure (hypertension) means that the top number stays high, or the bottom number stays high, or both. High blood pressure increases the risk of stroke, heart attack, and other problems. What happens when you have high blood pressure? · Blood flows through your arteries with too much force. Over time, this can damage the heart and the walls of your arteries. But you can't feel it. High blood pressure usually doesn't cause symptoms. · High blood pressure makes your heart work harder. And that can lead to heart failure, which means your heart doesn't pump as much blood as your body needs. · Fat and calcium start to build up in your arteries. This buildup is called hardening of the arteries. It can cause many problems including a heart attack and stroke. · Arteries also carry blood and oxygen to organs like your eyes, kidneys, and brain. If high blood pressure damages those arteries, it can lead to vision loss, kidney disease, stroke, and a higher risk of dementia. How can you prevent high blood pressure? · Stay at a healthy weight. · Try to limit how much sodium you eat to less than 2,300 milligrams (mg) a day.  If you limit your sodium to 1,500 mg a day, you can lower your blood pressure even more. ? Buy foods that are labeled \"unsalted,\" \"sodium-free,\" or \"low-sodium. \" Foods labeled \"reduced-sodium\" and \"light sodium\" may still have too much sodium. ? Flavor your food with garlic, lemon juice, onion, vinegar, herbs, and spices instead of salt. Do not use soy sauce, steak sauce, onion salt, garlic salt, mustard, or ketchup on your food. ? Use less salt (or none) when recipes call for it. You can often use half the salt a recipe calls for without losing flavor. · Be physically active. Get at least 30 minutes of exercise on most days of the week. Walking is a good choice. You also may want to do other activities, such as running, swimming, cycling, or playing tennis or team sports. · Limit alcohol to 2 drinks a day for men and 1 drink a day for women. · Eat plenty of fruits, vegetables, and low-fat dairy products. Eat less saturated and total fats. How is high blood pressure treated? · Your doctor will suggest making lifestyle changes to help your heart. For example, your doctor may ask you to eat healthy foods, quit smoking, lose extra weight, and be more active. · If lifestyle changes don't help enough, your doctor may recommend that you take medicine. · When blood pressure is very high, medicines are needed to lower it. Follow-up care is a key part of your treatment and safety. Be sure to make and go to all appointments, and call your doctor if you are having problems. It's also a good idea to know your test results and keep a list of the medicines you take. Where can you learn more? Go to http://www.[employer].com/  Enter P501 in the search box to learn more about \"Learning About High Blood Pressure. \"  Current as of: August 31, 2020               Content Version: 12.8  © 0638-5369 [employer].    Care instructions adapted under license by [employer] (which disclaims liability or warranty for this information). If you have questions about a medical condition or this instruction, always ask your healthcare professional. [place] [address] any warranty or liability for your use of this information.

## 2021-07-30 LAB
25(OH)D3 SERPL-MCNC: 46.6 NG/ML (ref 30–100)
ALBUMIN SERPL-MCNC: 4.3 G/DL (ref 3.5–5)
ALBUMIN/GLOB SERPL: 1.4 {RATIO} (ref 1.1–2.2)
ALP SERPL-CCNC: 88 U/L (ref 45–117)
ALT SERPL-CCNC: 28 U/L (ref 12–78)
ANION GAP SERPL CALC-SCNC: 8 MMOL/L (ref 5–15)
AST SERPL-CCNC: 28 U/L (ref 15–37)
BASOPHILS # BLD: 0 K/UL (ref 0–0.1)
BASOPHILS NFR BLD: 1 % (ref 0–1)
BILIRUB SERPL-MCNC: 0.7 MG/DL (ref 0.2–1)
BUN SERPL-MCNC: 19 MG/DL (ref 6–20)
BUN/CREAT SERPL: 15 (ref 12–20)
CALCIUM SERPL-MCNC: 9.5 MG/DL (ref 8.5–10.1)
CHLORIDE SERPL-SCNC: 104 MMOL/L (ref 97–108)
CHOLEST SERPL-MCNC: 138 MG/DL
CO2 SERPL-SCNC: 24 MMOL/L (ref 21–32)
CREAT SERPL-MCNC: 1.3 MG/DL (ref 0.55–1.02)
DIFFERENTIAL METHOD BLD: ABNORMAL
EOSINOPHIL # BLD: 0.3 K/UL (ref 0–0.4)
EOSINOPHIL NFR BLD: 4 % (ref 0–7)
ERYTHROCYTE [DISTWIDTH] IN BLOOD BY AUTOMATED COUNT: 12.8 % (ref 11.5–14.5)
GLOBULIN SER CALC-MCNC: 3 G/DL (ref 2–4)
GLUCOSE SERPL-MCNC: 89 MG/DL (ref 65–100)
HCT VFR BLD AUTO: 34.7 % (ref 35–47)
HDLC SERPL-MCNC: 43 MG/DL
HDLC SERPL: 3.2 {RATIO} (ref 0–5)
HGB BLD-MCNC: 11 G/DL (ref 11.5–16)
IMM GRANULOCYTES # BLD AUTO: 0 K/UL (ref 0–0.04)
IMM GRANULOCYTES NFR BLD AUTO: 0 % (ref 0–0.5)
LDLC SERPL CALC-MCNC: 62.8 MG/DL (ref 0–100)
LYMPHOCYTES # BLD: 1.9 K/UL (ref 0.8–3.5)
LYMPHOCYTES NFR BLD: 29 % (ref 12–49)
MCH RBC QN AUTO: 30.4 PG (ref 26–34)
MCHC RBC AUTO-ENTMCNC: 31.7 G/DL (ref 30–36.5)
MCV RBC AUTO: 95.9 FL (ref 80–99)
MONOCYTES # BLD: 0.5 K/UL (ref 0–1)
MONOCYTES NFR BLD: 7 % (ref 5–13)
NEUTS SEG # BLD: 4 K/UL (ref 1.8–8)
NEUTS SEG NFR BLD: 59 % (ref 32–75)
NRBC # BLD: 0 K/UL (ref 0–0.01)
NRBC BLD-RTO: 0 PER 100 WBC
PLATELET # BLD AUTO: 235 K/UL (ref 150–400)
PMV BLD AUTO: 10.4 FL (ref 8.9–12.9)
POTASSIUM SERPL-SCNC: 5.4 MMOL/L (ref 3.5–5.1)
PROT SERPL-MCNC: 7.3 G/DL (ref 6.4–8.2)
RBC # BLD AUTO: 3.62 M/UL (ref 3.8–5.2)
SODIUM SERPL-SCNC: 136 MMOL/L (ref 136–145)
TRIGL SERPL-MCNC: 161 MG/DL (ref ?–150)
TSH SERPL DL<=0.05 MIU/L-ACNC: 3.33 UIU/ML (ref 0.36–3.74)
VLDLC SERPL CALC-MCNC: 32.2 MG/DL
WBC # BLD AUTO: 6.8 K/UL (ref 3.6–11)

## 2021-07-30 NOTE — PROGRESS NOTES
Please call. Vitamin D is normal. Thyroid, sodium were normal. Cholesterol was great. Potassium slightly elevated, avoid foods high in potassium like bananas, sweet potatoes, broccoli.

## 2021-07-30 NOTE — PROGRESS NOTES
Patient verified by stating name and date of birth.  Patient informed of lab results and states understanding per Glenna Araya

## 2021-07-31 LAB
HCV AB S/CO SERPL IA: <0.1 S/CO RATIO (ref 0–0.9)
HCV AB SERPL QL IA: NORMAL

## 2022-01-04 DIAGNOSIS — I10 ESSENTIAL HYPERTENSION: ICD-10-CM

## 2022-01-05 RX ORDER — METOPROLOL SUCCINATE 50 MG/1
25 TABLET, EXTENDED RELEASE ORAL DAILY
Qty: 90 TABLET | Refills: 3 | Status: SHIPPED | OUTPATIENT
Start: 2022-01-05 | End: 2022-01-18 | Stop reason: SDUPTHER

## 2022-01-18 DIAGNOSIS — I10 ESSENTIAL HYPERTENSION: ICD-10-CM

## 2022-01-18 RX ORDER — METOPROLOL SUCCINATE 50 MG/1
25 TABLET, EXTENDED RELEASE ORAL DAILY
Qty: 90 TABLET | Refills: 3 | Status: SHIPPED | OUTPATIENT
Start: 2022-01-18 | End: 2022-02-03 | Stop reason: SDUPTHER

## 2022-01-18 RX ORDER — VALSARTAN 40 MG/1
40 TABLET ORAL DAILY
Qty: 90 TABLET | Refills: 3 | Status: SHIPPED | OUTPATIENT
Start: 2022-01-18

## 2022-01-27 DIAGNOSIS — E78.5 HYPERLIPIDEMIA, UNSPECIFIED HYPERLIPIDEMIA TYPE: ICD-10-CM

## 2022-01-27 RX ORDER — ATORVASTATIN CALCIUM 40 MG/1
TABLET, FILM COATED ORAL
Qty: 90 TABLET | Refills: 3 | Status: SHIPPED | OUTPATIENT
Start: 2022-01-27

## 2022-01-27 NOTE — TELEPHONE ENCOUNTER
Requested Prescriptions     Pending Prescriptions Disp Refills    atorvastatin (LIPITOR) 40 mg tablet 90 Tablet 3     Sig: TAKE 1 TABLET DAILY        (DISCONTINUE SIMVASTATIN)

## 2022-02-03 ENCOUNTER — OFFICE VISIT (OUTPATIENT)
Dept: FAMILY MEDICINE CLINIC | Age: 82
End: 2022-02-03
Payer: MEDICARE

## 2022-02-03 VITALS
SYSTOLIC BLOOD PRESSURE: 138 MMHG | OXYGEN SATURATION: 97 % | RESPIRATION RATE: 17 BRPM | DIASTOLIC BLOOD PRESSURE: 70 MMHG | BODY MASS INDEX: 30.53 KG/M2 | WEIGHT: 172.3 LBS | HEART RATE: 76 BPM | HEIGHT: 63 IN | TEMPERATURE: 98 F

## 2022-02-03 DIAGNOSIS — K60.2 RECTAL FISSURE: ICD-10-CM

## 2022-02-03 DIAGNOSIS — E78.5 HYPERLIPIDEMIA, UNSPECIFIED HYPERLIPIDEMIA TYPE: ICD-10-CM

## 2022-02-03 DIAGNOSIS — I10 ESSENTIAL HYPERTENSION: Primary | ICD-10-CM

## 2022-02-03 PROCEDURE — 99214 OFFICE O/P EST MOD 30 MIN: CPT | Performed by: NURSE PRACTITIONER

## 2022-02-03 RX ORDER — METOPROLOL SUCCINATE 50 MG/1
50 TABLET, EXTENDED RELEASE ORAL DAILY
Qty: 90 TABLET | Refills: 3 | Status: SHIPPED | OUTPATIENT
Start: 2022-02-03

## 2022-02-03 NOTE — PROGRESS NOTES
Chief Complaint   Patient presents with    Medication Evaluation     6mth check up       HPI:    Karis Fowler is a 80 y.o. female for a follow up visit. Anxiety/depression - Stable.     Hyponatremia - Stable, no longer on sodium supplements. Hypertension - On metoprolol QAM, valsartan QPM. Denies CP, weakness. Released from Dr. Pendleton [facility], will need cardiology f/u in 2 years. Venous insufficiency - follows with vascular through [facility]. Leaky valves, stable. No edema since stopping amlodipine.      New issues: The patient reports rectal discomfort, pressure, on and off for the past year. She has been using Miralax which has resulted in soft stools which helps with the discomfort but every time she stops, she has hard stools and feels like she's not emptying completely. She is on the fence about her COVID booster. Allergies   Allergen Reactions    Bactrim [Sulfamethoprim Ss] Nausea Only and Other (comments)     Blisters On  Lip     Ciprofloxacin Hives    Lisinopril Other (comments)     Felt bad       Current Outpatient Medications   Medication Sig    metoprolol succinate (TOPROL-XL) 50 mg XL tablet Take 1 Tablet by mouth daily.  atorvastatin (LIPITOR) 40 mg tablet TAKE 1 TABLET DAILY        (DISCONTINUE SIMVASTATIN)    valsartan (DIOVAN) 40 mg tablet Take 1 Tablet by mouth daily. At bedtime    pantoprazole (PROTONIX) 40 mg tablet TAKE 1 TABLET DAILY    LORazepam (ATIVAN) 0.5 mg tablet Take 0.5 mg by mouth every eight (8) hours as needed.  hydrOXYzine HCL (ATARAX) 25 mg tablet 1-2 tablets PO TID PRN anxiety    fluticasone (FLONASE) 50 mcg/actuation nasal spray 2 sprays in each nostril once daily PRN    Cholecalciferol, Vitamin D3, (VITAMIN D3) 1,000 unit cap Take 2 Caps by mouth daily.  aspirin delayed-release 81 mg tablet Take  by mouth daily. No current facility-administered medications for this visit.        Past Medical History:   Diagnosis Date    Edema     Hypercholesterolemia  Hypertension     Insomnia, unspecified     Osteopenia     Prediabetes     Unspecified vitamin D deficiency        Family History   Problem Relation Age of Onset    Hypertension Mother     Diabetes Mother     Hypertension Father     Kidney Disease Father     Diabetes Sister     Diabetes Brother        ROS:  Denies fever, chills, cough, chest pain, SOB,  nausea, vomiting, diarrhea, dysuria. Denies rashes, wounds, arthralgias,  weakness, numbness, visual changes, depression. Denies wt loss, wt gain, hemoptysis, hematochezia or melena. Patient is not experiencing chest pain radiating to the jaw and/or down the arms. Physical Examination:    /70 (BP 1 Location: Right arm, BP Patient Position: Sitting, BP Cuff Size: Adult long)   Pulse 76   Temp 98 °F (36.7 °C) (Temporal)   Resp 17   Ht 5' 2.5\" (1.588 m)   Wt 172 lb 4.8 oz (78.2 kg)   SpO2 97%   BMI 31.01 kg/m²     Wt Readings from Last 3 Encounters:   02/03/22 172 lb 4.8 oz (78.2 kg)   07/29/21 175 lb 9.6 oz (79.7 kg)   05/27/21 175 lb 9.6 oz (79.7 kg)     Constitutional: WDWN Female in no acute distress  HENT:  NC/AT  EYES: EOMI, PERRL  Neck:  Supple, no JVD, mass or bruit. No thyromegaly. Respiratory:  Respirations even and unlabored without use of accessory muscles, CTA throughout without wheezes, rales, rubs or rhonchi. Symmetrical chest expansion. Cardiac: RRR no clicks, murmurs. Rectal: Small superficial tear at the 11:00 position, no visible hemorrhoids, skin tag noted. Musculoskeletal:  No cyanosis, clubbing. Moves all extremities without difficulty. No edema. Neurologic:  Smooth, even gait without assistance, CN 2-12 grossly intact. Skin: intact and warm to the touch, no rash   Lymphadenopathy: no cervical or supraclavicular nodes  Psych: Pleasant and appropriate. Judgment normal. Alert and oriented x 3. ASSESSMENT AND PLAN:       ICD-10-CM ICD-9-CM    1.  Essential hypertension  I10 401.9 metoprolol succinate (TOPROL-XL) 50 mg XL tablet      COLLECTION VENOUS BLOOD,VENIPUNCTURE      CBC WITH AUTOMATED DIFF      LIPID PANEL      METABOLIC PANEL, COMPREHENSIVE      TSH 3RD GENERATION      TSH 3RD GENERATION      METABOLIC PANEL, COMPREHENSIVE      LIPID PANEL      CBC WITH AUTOMATED DIFF      COLLECTION VENOUS BLOOD,VENIPUNCTURE   2. Rectal fissure  K60.2 565.0    3. Hyperlipidemia, unspecified hyperlipidemia type  E78.5 272.4         Reviewed scanned notes from cardiology, vascular. Great reports. BP good. Weight stable. Labs as above. Pt declines flu vaccinations. We discussed COVID booster. I encouraged this. She will consider it. Chronic labs as above. Discussed rectal tear. Encouraged topical Desitin as she has not had relief with OTC hydrocortisone. Use Miralax daily to keep stools soft. Ok to take docusate capsules as well if necessary. Stay physically active, drink plenty of water. Patient aware of plan of care and verbalized understanding. Questions answered. RTC in 6 months, or sooner if needed.     Maryellen Benedict, DALE

## 2022-02-03 NOTE — PATIENT INSTRUCTIONS
You have a small rectal tear, called a fissure, at about 11:00. Apply Desitin every time you use the bathroom to your rectum. Take Miralax daily. I want your stool to be the consistency of pudding.

## 2022-02-04 LAB
ALBUMIN SERPL-MCNC: 4.1 G/DL (ref 3.5–5)
ALBUMIN/GLOB SERPL: 1.2 {RATIO} (ref 1.1–2.2)
ALP SERPL-CCNC: 86 U/L (ref 45–117)
ALT SERPL-CCNC: 22 U/L (ref 12–78)
ANION GAP SERPL CALC-SCNC: 6 MMOL/L (ref 5–15)
AST SERPL-CCNC: 17 U/L (ref 15–37)
BASOPHILS # BLD: 0.1 K/UL (ref 0–0.1)
BASOPHILS NFR BLD: 1 % (ref 0–1)
BILIRUB SERPL-MCNC: 0.6 MG/DL (ref 0.2–1)
BUN SERPL-MCNC: 20 MG/DL (ref 6–20)
BUN/CREAT SERPL: 15 (ref 12–20)
CALCIUM SERPL-MCNC: 9.7 MG/DL (ref 8.5–10.1)
CHLORIDE SERPL-SCNC: 102 MMOL/L (ref 97–108)
CHOLEST SERPL-MCNC: 154 MG/DL
CO2 SERPL-SCNC: 26 MMOL/L (ref 21–32)
CREAT SERPL-MCNC: 1.37 MG/DL (ref 0.55–1.02)
DIFFERENTIAL METHOD BLD: NORMAL
EOSINOPHIL # BLD: 0.3 K/UL (ref 0–0.4)
EOSINOPHIL NFR BLD: 5 % (ref 0–7)
ERYTHROCYTE [DISTWIDTH] IN BLOOD BY AUTOMATED COUNT: 13 % (ref 11.5–14.5)
GLOBULIN SER CALC-MCNC: 3.3 G/DL (ref 2–4)
GLUCOSE SERPL-MCNC: 106 MG/DL (ref 65–100)
HCT VFR BLD AUTO: 38.6 % (ref 35–47)
HDLC SERPL-MCNC: 46 MG/DL
HDLC SERPL: 3.3 {RATIO} (ref 0–5)
HGB BLD-MCNC: 12.3 G/DL (ref 11.5–16)
IMM GRANULOCYTES # BLD AUTO: 0 K/UL (ref 0–0.04)
IMM GRANULOCYTES NFR BLD AUTO: 0 % (ref 0–0.5)
LDLC SERPL CALC-MCNC: 66.2 MG/DL (ref 0–100)
LYMPHOCYTES # BLD: 1.6 K/UL (ref 0.8–3.5)
LYMPHOCYTES NFR BLD: 25 % (ref 12–49)
MCH RBC QN AUTO: 29.9 PG (ref 26–34)
MCHC RBC AUTO-ENTMCNC: 31.9 G/DL (ref 30–36.5)
MCV RBC AUTO: 93.9 FL (ref 80–99)
MONOCYTES # BLD: 0.4 K/UL (ref 0–1)
MONOCYTES NFR BLD: 7 % (ref 5–13)
NEUTS SEG # BLD: 4.1 K/UL (ref 1.8–8)
NEUTS SEG NFR BLD: 62 % (ref 32–75)
NRBC # BLD: 0 K/UL (ref 0–0.01)
NRBC BLD-RTO: 0 PER 100 WBC
PLATELET # BLD AUTO: 239 K/UL (ref 150–400)
PMV BLD AUTO: 10 FL (ref 8.9–12.9)
POTASSIUM SERPL-SCNC: 4.9 MMOL/L (ref 3.5–5.1)
PROT SERPL-MCNC: 7.4 G/DL (ref 6.4–8.2)
RBC # BLD AUTO: 4.11 M/UL (ref 3.8–5.2)
SODIUM SERPL-SCNC: 134 MMOL/L (ref 136–145)
TRIGL SERPL-MCNC: 209 MG/DL (ref ?–150)
TSH SERPL DL<=0.05 MIU/L-ACNC: 3.26 UIU/ML (ref 0.36–3.74)
VLDLC SERPL CALC-MCNC: 41.8 MG/DL
WBC # BLD AUTO: 6.6 K/UL (ref 3.6–11)

## 2022-02-04 NOTE — PROGRESS NOTES
Please call. Labs are all stable. Kidney function has worsened slightly. She needs to stay well hydrated and avoid NSAIDs like Aleve, ibuprofen. Tylenol is okay. All other labs are okay. I will see her back this summer.

## 2022-02-04 NOTE — PROGRESS NOTES
Patient verified by stating name and date of birth.  Patient informed of lab results and states understanding per Alex Woods

## 2022-03-02 ENCOUNTER — TELEPHONE (OUTPATIENT)
Dept: FAMILY MEDICINE CLINIC | Age: 82
End: 2022-03-02

## 2022-03-02 NOTE — TELEPHONE ENCOUNTER
The patient is calling stating she has Shingles and wants to know if there is an otc cream that could be recommended for her.  Her call back # is [phone]

## 2022-03-04 ENCOUNTER — OFFICE VISIT (OUTPATIENT)
Dept: FAMILY MEDICINE CLINIC | Age: 82
End: 2022-03-04
Payer: MEDICARE

## 2022-03-04 VITALS
WEIGHT: 172.6 LBS | DIASTOLIC BLOOD PRESSURE: 76 MMHG | SYSTOLIC BLOOD PRESSURE: 138 MMHG | HEART RATE: 64 BPM | OXYGEN SATURATION: 98 % | HEIGHT: 63 IN | RESPIRATION RATE: 17 BRPM | TEMPERATURE: 97.8 F | BODY MASS INDEX: 30.58 KG/M2

## 2022-03-04 DIAGNOSIS — H92.02 OTALGIA OF LEFT EAR: ICD-10-CM

## 2022-03-04 DIAGNOSIS — B02.29 POST HERPETIC NEURALGIA: Primary | ICD-10-CM

## 2022-03-04 DIAGNOSIS — J30.2 SEASONAL ALLERGIC RHINITIS, UNSPECIFIED TRIGGER: ICD-10-CM

## 2022-03-04 PROCEDURE — 99214 OFFICE O/P EST MOD 30 MIN: CPT

## 2022-03-04 RX ORDER — TRAMADOL HYDROCHLORIDE 50 MG/1
50 TABLET ORAL
Qty: 10 TABLET | Refills: 0 | Status: SHIPPED | OUTPATIENT
Start: 2022-03-04 | End: 2022-03-14

## 2022-03-04 RX ORDER — PREDNISONE 10 MG/1
TABLET ORAL
Qty: 21 TABLET | Refills: 0 | Status: SHIPPED | OUTPATIENT
Start: 2022-03-04 | End: 2022-08-12

## 2022-03-04 NOTE — PROGRESS NOTES
1. Have you been to the ER, urgent care clinic since your last visit? Hospitalized since your last visit? No    2. Have you seen or consulted any other health care providers outside of the [address] [place] [address] since your last visit? Include any pap smears or colon screening.  No

## 2022-03-04 NOTE — PROGRESS NOTES
Chief Complaint   Patient presents with    Shingles       HPI:    Alexandra Anderson is a 80 y.o. female who presents for an acute visit. She noted irritation across her left breast beginning about 10 days ago; she was seen at [facility] for this on 2/25 and started on valacyclovir for shingles. She has since completed the valacyclovir regimen, but continues to have pain and rash across her left breast, around her ribs, and into her upper back; she has been using cool compresses and APAP with some relief. Pain is worse at night and when wearing a bra. She also notes some left pain last night which is resolved today. She notes occasional runny nose and sore throat over the last few days, but denies cough, SOB, chills, CP, or other symptoms. Allergies   Allergen Reactions    Bactrim [Sulfamethoprim Ss] Nausea Only and Other (comments)     Blisters On  Lip     Ciprofloxacin Hives    Lisinopril Other (comments)     Felt bad       Current Outpatient Medications   Medication Sig    predniSONE (STERAPRED DS) 10 mg dose pack See administration instruction per 10mg dose pack    traMADoL (ULTRAM) 50 mg tablet Take 1 Tablet by mouth nightly as needed for Pain for up to 10 days. Max Daily Amount: 50 mg.    metoprolol succinate (TOPROL-XL) 50 mg XL tablet Take 1 Tablet by mouth daily.  atorvastatin (LIPITOR) 40 mg tablet TAKE 1 TABLET DAILY        (DISCONTINUE SIMVASTATIN)    valsartan (DIOVAN) 40 mg tablet Take 1 Tablet by mouth daily. At bedtime    pantoprazole (PROTONIX) 40 mg tablet TAKE 1 TABLET DAILY    hydrOXYzine HCL (ATARAX) 25 mg tablet 1-2 tablets PO TID PRN anxiety    fluticasone (FLONASE) 50 mcg/actuation nasal spray 2 sprays in each nostril once daily PRN    Cholecalciferol, Vitamin D3, (VITAMIN D3) 1,000 unit cap Take 2 Caps by mouth daily.  aspirin delayed-release 81 mg tablet Take  by mouth daily. No current facility-administered medications for this visit.        Past Medical History: Diagnosis Date    Edema     Hypercholesterolemia     Hypertension     Insomnia, unspecified     Osteopenia     Prediabetes     Unspecified vitamin D deficiency        Family History   Problem Relation Age of Onset    Hypertension Mother     Diabetes Mother     Hypertension Father     Kidney Disease Father     Diabetes Sister     Diabetes Brother          Review of Systems   Constitutional: Negative. Negative for chills, fever and malaise/fatigue. HENT: Positive for ear pain and sore throat. Clear rhinorrhea   Eyes: Negative. Respiratory: Negative. Negative for cough and shortness of breath. Cardiovascular: Negative. Negative for chest pain, palpitations and leg swelling. Gastrointestinal: Negative. Negative for abdominal pain, nausea and vomiting. Genitourinary: Negative. Musculoskeletal: Negative. Skin: Positive for rash. Neurological: Negative. Negative for dizziness and headaches. Endo/Heme/Allergies: Negative. Psychiatric/Behavioral: Negative. Negative for depression. The patient is not nervous/anxious. /76 (BP 1 Location: Right arm, BP Patient Position: Sitting, BP Cuff Size: Adult)   Pulse 64   Temp 97.8 °F (36.6 °C) (Temporal)   Resp 17   Ht 5' 2.5\" (1.588 m)   Wt 172 lb 9.6 oz (78.3 kg)   SpO2 98%   BMI 31.07 kg/m²     Wt Readings from Last 3 Encounters:   03/04/22 172 lb 9.6 oz (78.3 kg)   02/03/22 172 lb 4.8 oz (78.2 kg)   07/29/21 175 lb 9.6 oz (79.7 kg)       3 most recent PHQ Screens 3/4/2022   PHQ Not Done -   Little interest or pleasure in doing things Not at all   Feeling down, depressed, irritable, or hopeless Not at all   Total Score PHQ 2 0       Physical Exam  Vitals and nursing note reviewed. Constitutional:       General: She is not in acute distress. Appearance: Normal appearance. HENT:      Head: Normocephalic and atraumatic.       Right Ear: Tympanic membrane, ear canal and external ear normal.      Left Ear: Tympanic membrane, ear canal and external ear normal.   Eyes:      Extraocular Movements: Extraocular movements intact. Conjunctiva/sclera: Conjunctivae normal.      Pupils: Pupils are equal, round, and reactive to light. Cardiovascular:      Rate and Rhythm: Normal rate and regular rhythm. Pulses: Normal pulses. Heart sounds: Normal heart sounds. No murmur heard. No friction rub. No gallop. Pulmonary:      Effort: Pulmonary effort is normal.      Breath sounds: Normal breath sounds. No wheezing, rhonchi or rales. Musculoskeletal:      Cervical back: Normal range of motion. Skin:     General: Skin is warm and dry. Comments: Erythematous macular rash at T3 dermatome without presence of vesicles   Neurological:      General: No focal deficit present. Mental Status: She is alert and oriented to person, place, and time. Mental status is at baseline. Psychiatric:         Mood and Affect: Mood normal.         Behavior: Behavior normal.         Thought Content: Thought content normal.         Judgment: Judgment normal.       ASSESSMENT AND PLAN:       ICD-10-CM ICD-9-CM    1. Post herpetic neuralgia  B02.29 053.19 predniSONE (STERAPRED DS) 10 mg dose pack      traMADoL (ULTRAM) 50 mg tablet   2. Otalgia of left ear  H92.02 388.70    3. Seasonal allergic rhinitis, unspecified trigger  J30.2 477.9        Counseled on disease course, recommend she consider Shingrix vaccine once rash has resolved to reduce future incidences. Short course of steroids to help with pain, Tramadol for use at bedtime PRN;  was reviewed and there were no discrepancies noted. The risks of the use of opioid analgesics including but not limited to the risks of pulmonary depression, nausea, constipation, lethargy, and drug dependency have been previously discussed.  Instructions for the proper use of Tramadol as well as potential complications including GI and respiratory symptoms, sedation, euphoria, and dependency were discussed. Recommend she use salt-water gargle, Flonase, OTC anthistamine such as cetrizine or fexofenadine for ear pain and rhinitis. Medication Side Effects and Warnings were discussed with patient:  yes  Patient Labs were reviewed:  yes  Patient Past Records were reviewed:  yes      Patient aware of plan of care and verbalized understanding. Questions answered. RTC PRN or sooner if needed. On this date 03/04/2022 I have spent 30 minutes reviewing previous notes, test results and face to face with the patient discussing the diagnosis and importance of compliance with the treatment plan as well as documenting on the day of the visit.     Abel Santiago, DALE

## 2022-03-04 NOTE — PATIENT INSTRUCTIONS

## 2022-03-19 PROBLEM — E87.1 HYPONATREMIA: Status: ACTIVE | Noted: 2019-05-06

## 2022-03-19 PROBLEM — Z71.89 ACP (ADVANCE CARE PLANNING): Status: ACTIVE | Noted: 2017-02-13

## 2022-06-06 DIAGNOSIS — K21.9 GASTROESOPHAGEAL REFLUX DISEASE WITHOUT ESOPHAGITIS: ICD-10-CM

## 2022-06-07 RX ORDER — PANTOPRAZOLE SODIUM 40 MG/1
TABLET, DELAYED RELEASE ORAL
Qty: 90 TABLET | Refills: 3 | Status: SHIPPED | OUTPATIENT
Start: 2022-06-07

## 2022-08-12 ENCOUNTER — OFFICE VISIT (OUTPATIENT)
Dept: FAMILY MEDICINE CLINIC | Age: 82
End: 2022-08-12
Payer: MEDICARE

## 2022-08-12 VITALS
TEMPERATURE: 97.6 F | OXYGEN SATURATION: 98 % | WEIGHT: 173.2 LBS | HEIGHT: 64 IN | RESPIRATION RATE: 16 BRPM | SYSTOLIC BLOOD PRESSURE: 148 MMHG | DIASTOLIC BLOOD PRESSURE: 76 MMHG | BODY MASS INDEX: 29.57 KG/M2 | HEART RATE: 54 BPM

## 2022-08-12 DIAGNOSIS — R53.83 FATIGUE, UNSPECIFIED TYPE: ICD-10-CM

## 2022-08-12 DIAGNOSIS — I10 ESSENTIAL HYPERTENSION: ICD-10-CM

## 2022-08-12 DIAGNOSIS — E55.9 VITAMIN D DEFICIENCY: ICD-10-CM

## 2022-08-12 DIAGNOSIS — Z00.00 MEDICARE ANNUAL WELLNESS VISIT, SUBSEQUENT: Primary | ICD-10-CM

## 2022-08-12 DIAGNOSIS — Z13.39 SCREENING FOR ALCOHOLISM: ICD-10-CM

## 2022-08-12 DIAGNOSIS — J01.00 ACUTE NON-RECURRENT MAXILLARY SINUSITIS: ICD-10-CM

## 2022-08-12 DIAGNOSIS — Z13.31 SCREENING FOR DEPRESSION: ICD-10-CM

## 2022-08-12 LAB
25(OH)D3 SERPL-MCNC: 49.1 NG/ML (ref 30–100)
ALBUMIN SERPL-MCNC: 4.4 G/DL (ref 3.5–5)
ALBUMIN/GLOB SERPL: 1.6 {RATIO} (ref 1.1–2.2)
ALP SERPL-CCNC: 76 U/L (ref 45–117)
ALT SERPL-CCNC: 20 U/L (ref 12–78)
ANION GAP SERPL CALC-SCNC: 8 MMOL/L (ref 5–15)
AST SERPL-CCNC: 19 U/L (ref 15–37)
BASOPHILS # BLD: 0.1 K/UL (ref 0–0.1)
BASOPHILS NFR BLD: 1 % (ref 0–1)
BILIRUB SERPL-MCNC: 0.6 MG/DL (ref 0.2–1)
BUN SERPL-MCNC: 18 MG/DL (ref 6–20)
BUN/CREAT SERPL: 13 (ref 12–20)
CALCIUM SERPL-MCNC: 9.4 MG/DL (ref 8.5–10.1)
CHLORIDE SERPL-SCNC: 100 MMOL/L (ref 97–108)
CHOLEST SERPL-MCNC: 138 MG/DL
CO2 SERPL-SCNC: 26 MMOL/L (ref 21–32)
CREAT SERPL-MCNC: 1.41 MG/DL (ref 0.55–1.02)
DIFFERENTIAL METHOD BLD: NORMAL
EOSINOPHIL # BLD: 0.4 K/UL (ref 0–0.4)
EOSINOPHIL NFR BLD: 7 % (ref 0–7)
ERYTHROCYTE [DISTWIDTH] IN BLOOD BY AUTOMATED COUNT: 11.9 % (ref 11.5–14.5)
GLOBULIN SER CALC-MCNC: 2.8 G/DL (ref 2–4)
GLUCOSE SERPL-MCNC: 96 MG/DL (ref 65–100)
HCT VFR BLD AUTO: 38.4 % (ref 35–47)
HDLC SERPL-MCNC: 41 MG/DL
HDLC SERPL: 3.4 {RATIO} (ref 0–5)
HGB BLD-MCNC: 12.2 G/DL (ref 11.5–16)
IMM GRANULOCYTES # BLD AUTO: 0 K/UL (ref 0–0.04)
IMM GRANULOCYTES NFR BLD AUTO: 0 % (ref 0–0.5)
LDLC SERPL CALC-MCNC: 64 MG/DL (ref 0–100)
LYMPHOCYTES # BLD: 1.9 K/UL (ref 0.8–3.5)
LYMPHOCYTES NFR BLD: 31 % (ref 12–49)
MCH RBC QN AUTO: 30.5 PG (ref 26–34)
MCHC RBC AUTO-ENTMCNC: 31.8 G/DL (ref 30–36.5)
MCV RBC AUTO: 96 FL (ref 80–99)
MONOCYTES # BLD: 0.5 K/UL (ref 0–1)
MONOCYTES NFR BLD: 9 % (ref 5–13)
NEUTS SEG # BLD: 3.2 K/UL (ref 1.8–8)
NEUTS SEG NFR BLD: 52 % (ref 32–75)
NRBC # BLD: 0 K/UL (ref 0–0.01)
NRBC BLD-RTO: 0 PER 100 WBC
PLATELET # BLD AUTO: 255 K/UL (ref 150–400)
PMV BLD AUTO: 9.8 FL (ref 8.9–12.9)
POTASSIUM SERPL-SCNC: 5.3 MMOL/L (ref 3.5–5.1)
PROT SERPL-MCNC: 7.2 G/DL (ref 6.4–8.2)
RBC # BLD AUTO: 4 M/UL (ref 3.8–5.2)
SODIUM SERPL-SCNC: 134 MMOL/L (ref 136–145)
TRIGL SERPL-MCNC: 165 MG/DL (ref ?–150)
TSH SERPL DL<=0.05 MIU/L-ACNC: 1.92 UIU/ML (ref 0.36–3.74)
VLDLC SERPL CALC-MCNC: 33 MG/DL
WBC # BLD AUTO: 6.1 K/UL (ref 3.6–11)

## 2022-08-12 PROCEDURE — G0439 PPPS, SUBSEQ VISIT: HCPCS | Performed by: NURSE PRACTITIONER

## 2022-08-12 PROCEDURE — 99214 OFFICE O/P EST MOD 30 MIN: CPT | Performed by: NURSE PRACTITIONER

## 2022-08-12 NOTE — PROGRESS NOTES
Identified pt with two pt identifiers(name and ). Reviewed record in preparation for visit and have obtained necessary documentation. Chief Complaint   Patient presents with    Hypertension     Low readings in the morning for a couple weeks; have had fatigue     Annual Wellness Visit      Vitals:    22 1009 22 1041   BP: (!) 170/82 (!) 148/76   Pulse: (!) 54    Resp: 16    Temp: 97.6 °F (36.4 °C)    TempSrc: Temporal    SpO2: 98%    Weight: 173 lb 3.2 oz (78.6 kg)    Height: 5' 4\" (1.626 m)    PainSc:   0 - No pain        Coordination of Care Questionnaire:  :       1. \"Have you been to the ER, urgent care clinic since your last visit? Hospitalized since your last visit? \" No    2. \"Have you seen or consulted any other health care providers outside of the [address] [place] [address] since your last visit? \" No     3. For patients aged 39-70: Has the patient had a colonoscopy / FIT/ Cologuard? NA - based on age      If the patient is female:    4. For patients aged 41-77: Has the patient had a mammogram within the past 2 years? NA - based on age or sex      11. For patients aged 21-65: Has the patient had a pap smear? NA - based on age or sex    This is the Subsequent Medicare Annual Wellness Exam, performed 12 months or more after the Initial AWV or the last Subsequent AWV    I have reviewed the patient's medical history in detail and updated the computerized patient record. Assessment/Plan   Education and counseling provided:  Are appropriate based on today's review and evaluation    1. Medicare annual wellness visit, subsequent  2. Essential hypertension  -     COLLECTION VENOUS BLOOD,VENIPUNCTURE; Future  -     CBC WITH AUTOMATED DIFF; Future  -     METABOLIC PANEL, COMPREHENSIVE; Future  -     TSH 3RD GENERATION; Future  -     LIPID PANEL; Future  3. Screening for alcoholism  -     [place] ANNUAL ALCOHOL SCREEN 15 MIN  4. Screening for depression  -     DEPRESSION SCREEN ANNUAL  5.  Vitamin D deficiency  -     VITAMIN D, 25 HYDROXY; Future  6. Fatigue, unspecified type  -     VITAMIN D, 25 HYDROXY; Future  -     TSH 3RD GENERATION; Future       Depression Risk Factor Screening     3 most recent PHQ Screens 3/4/2022   PHQ Not Done -   Little interest or pleasure in doing things Not at all   Feeling down, depressed, irritable, or hopeless Not at all   Total Score PHQ 2 0       Alcohol & Drug Abuse Risk Screen    Do you average more than 1 drink per night or more than 7 drinks a week:  No    On any one occasion in the past three months have you have had more than 3 drinks containing alcohol:  No          Functional Ability and Level of Safety    Hearing: Hearing is good. Activities of Daily Living: The home contains: no safety equipment. Patient does total self care      Ambulation: with no difficulty     Fall Risk:  Fall Risk Assessment, last 12 mths 3/4/2022   Able to walk? Yes   Fall in past 12 months? 0   Do you feel unsteady? 0   Are you worried about falling 0   Number of falls in past 12 months -   Fall with injury?  -      Abuse Screen:  Patient is not abused       Cognitive Screening    Has your family/caregiver stated any concerns about your memory: no     Cognitive Screening: Normal - MMSE (Mini Mental Status Exam)    Health Maintenance Due     Health Maintenance Due   Topic Date Due    Shingrix Vaccine Age 49> (1 of 2) Never done    COVID-19 Vaccine (4 - Booster for Moderna series) 06/03/2022       Patient Care Team   Patient Care Team:  Olu Jimenez NP as PCP - General (Nurse Practitioner)  Olu Jimenez NP as PCP - REHABILITATION [facility] Empaneled Provider    History     Patient Active Problem List   Diagnosis Code    Hypertension I10    Hyperlipemia E78.5    Unspecified vitamin D deficiency E55.9    Pre-diabetes R73.03    Prediabetes R73.03    ACP (advance care planning) Z71.89    Hyponatremia E87.1     Past Medical History:   Diagnosis Date    Edema     Hypercholesterolemia Hypertension     Insomnia, unspecified     Osteopenia     Prediabetes     Unspecified vitamin D deficiency       Past Surgical History:   Procedure Laterality Date    HX TUBAL LIGATION       Current Outpatient Medications   Medication Sig Dispense Refill    pantoprazole (PROTONIX) 40 mg tablet TAKE 1 TABLET BY MOUTH  DAILY 90 Tablet 3    metoprolol succinate (TOPROL-XL) 50 mg XL tablet Take 1 Tablet by mouth daily. 90 Tablet 3    atorvastatin (LIPITOR) 40 mg tablet TAKE 1 TABLET DAILY        (DISCONTINUE SIMVASTATIN) 90 Tablet 3    valsartan (DIOVAN) 40 mg tablet Take 1 Tablet by mouth daily. At bedtime 90 Tablet 3    hydrOXYzine HCL (ATARAX) 25 mg tablet 1-2 tablets PO TID PRN anxiety 30 Tab 0    fluticasone (FLONASE) 50 mcg/actuation nasal spray 2 sprays in each nostril once daily PRN 1 Bottle 0    Cholecalciferol, Vitamin D3, (VITAMIN D3) 1,000 unit cap Take 2 Caps by mouth daily. aspirin delayed-release 81 mg tablet Take  by mouth daily.        Allergies   Allergen Reactions    Bactrim [Sulfamethoprim Ss] Nausea Only and Other (comments)     Blisters On  Lip     Ciprofloxacin Hives    Lisinopril Other (comments)     Felt bad       Family History   Problem Relation Age of Onset    Hypertension Mother     Diabetes Mother     Hypertension Father     Kidney Disease Father     Diabetes Sister     Diabetes Brother      Social History     Tobacco Use    Smoking status: Former     Years: 40.00     Types: Cigarettes    Smokeless tobacco: Never   Substance Use Topics    Alcohol use: No         Katrina Bernardo Lanes

## 2022-08-12 NOTE — PATIENT INSTRUCTIONS

## 2022-08-12 NOTE — PROGRESS NOTES
Chief Complaint   Patient presents with    Hypertension     Low readings in the morning for a couple weeks; have had fatigue     Annual Wellness Visit         HPI:    Shai Thomas is a [de-identified] y.o. female for a follow up visit and AWV. Anxiety/depression - Stable. Hyponatremia - Stable, no longer on sodium supplements. Hypertension - On metoprolol QAM, valsartan QPM. Released from Dr. Hussein Sandoval [facility], will need cardiology f/u in 2 years. Venous insufficiency - follows with vascular through [facility]. Leaky valves, stable. No edema since stopping amlodipine. New issues: Patient has singles this spring. She reports some lower AM BP readings and fatigue over the past few weeks. She wakes up feeling tired and her diastolic BP is 42-00. Systolic 834-209. Allergies   Allergen Reactions    Bactrim [Sulfamethoprim Ss] Nausea Only and Other (comments)     Blisters On  Lip     Ciprofloxacin Hives    Lisinopril Other (comments)     Felt bad       Current Outpatient Medications   Medication Sig    pantoprazole (PROTONIX) 40 mg tablet TAKE 1 TABLET BY MOUTH  DAILY    metoprolol succinate (TOPROL-XL) 50 mg XL tablet Take 1 Tablet by mouth daily. atorvastatin (LIPITOR) 40 mg tablet TAKE 1 TABLET DAILY        (DISCONTINUE SIMVASTATIN)    valsartan (DIOVAN) 40 mg tablet Take 1 Tablet by mouth daily. At bedtime    hydrOXYzine HCL (ATARAX) 25 mg tablet 1-2 tablets PO TID PRN anxiety    fluticasone (FLONASE) 50 mcg/actuation nasal spray 2 sprays in each nostril once daily PRN    Cholecalciferol, Vitamin D3, (VITAMIN D3) 1,000 unit cap Take 2 Caps by mouth daily. aspirin delayed-release 81 mg tablet Take  by mouth daily. No current facility-administered medications for this visit.        Past Medical History:   Diagnosis Date    Edema     Hypercholesterolemia     Hypertension     Insomnia, unspecified     Osteopenia     Prediabetes     Unspecified vitamin D deficiency        Family History   Problem Relation Age of Onset Hypertension Mother     Diabetes Mother     Hypertension Father     Kidney Disease Father     Diabetes Sister     Diabetes Brother        ROS:  Denies fever, chills, cough, chest pain, SOB,  nausea, vomiting, diarrhea, dysuria. Denies rashes, wounds, arthralgias,  weakness, numbness, visual changes, depression. Denies wt loss, wt gain, hemoptysis, hematochezia or melena. Patient is not experiencing chest pain radiating to the jaw and/or down the arms. Physical Examination:    BP (!) 148/76 (BP 1 Location: Left upper arm, BP Patient Position: Sitting, BP Cuff Size: Adult)   Pulse (!) 54   Temp 97.6 °F (36.4 °C) (Temporal)   Resp 16   Ht 5' 4\" (1.626 m)   Wt 173 lb 3.2 oz (78.6 kg)   SpO2 98%   BMI 29.73 kg/m²     Wt Readings from Last 3 Encounters:   08/12/22 173 lb 3.2 oz (78.6 kg)   03/04/22 172 lb 9.6 oz (78.3 kg)   02/03/22 172 lb 4.8 oz (78.2 kg)     Constitutional: WDWN Female in no acute distress  HENT:  NC/AT  EYES: EOMI, PERRL  Neck:  Supple, no JVD, mass or bruit. No thyromegaly. Respiratory:  Respirations even and unlabored without use of accessory muscles, CTA throughout without wheezes, rales, rubs or rhonchi. Symmetrical chest expansion. Cardiac: RRR no clicks, murmurs. Musculoskeletal:  No cyanosis, clubbing. Moves all extremities without difficulty. No edema. Neurologic:  Smooth, even gait without assistance, CN 2-12 grossly intact. Skin: intact and warm to the touch, no rash   Lymphadenopathy: no cervical or supraclavicular nodes  Psych: Pleasant and appropriate. Judgment normal. Alert and oriented x 3. ASSESSMENT AND PLAN:       ICD-10-CM ICD-9-CM    1. Medicare annual wellness visit, subsequent  Z00.00 V70.0       2.  Essential hypertension  I10 401.9 COLLECTION VENOUS BLOOD,VENIPUNCTURE      CBC WITH AUTOMATED DIFF      METABOLIC PANEL, COMPREHENSIVE      TSH 3RD GENERATION      LIPID PANEL      LIPID PANEL      TSH 3RD GENERATION      METABOLIC PANEL, COMPREHENSIVE CBC WITH AUTOMATED DIFF      COLLECTION VENOUS BLOOD,VENIPUNCTURE      3. Screening for alcoholism  Z13.39 V79.1 [place] ANNUAL ALCOHOL SCREEN 15 MIN      4. Screening for depression  Z13.31 V79.0 DEPRESSION SCREEN ANNUAL      5. Vitamin D deficiency  E55.9 268.9 VITAMIN D, 25 HYDROXY      VITAMIN D, 25 HYDROXY      6. Fatigue, unspecified type  R53.83 780.79 VITAMIN D, 25 HYDROXY      TSH 3RD GENERATION      TSH 3RD GENERATION      VITAMIN D, 25 HYDROXY         BP elevated x 2 today. I hesitate to change her meds based on these readings. Move valsartan and metoprolol to mid-day when she is feeling well and monitor her symptoms. Chronic labs today. Patient aware of plan of care and verbalized understanding. Questions answered. RTC in 6 months, or sooner if needed.     Suly Hendricks NP

## 2022-08-15 RX ORDER — METHYLPREDNISOLONE 4 MG/1
TABLET ORAL
Qty: 1 DOSE PACK | Refills: 0 | Status: SHIPPED | OUTPATIENT
Start: 2022-08-15

## 2022-08-15 NOTE — PROGRESS NOTES
Spoke to patient gave her results. She states that she has a sinus infection that she believes is keeping her BP slightly high. She would like to know if you can send her in medication?

## 2022-08-15 NOTE — PROGRESS NOTES
Please call, labs are stable. Triglycerides have improved compared to last check. Kidney function remains slightly impaired but at her baseline. How is her blood pressure running at home?

## 2022-09-08 ENCOUNTER — HOSPITAL ENCOUNTER (OUTPATIENT)
Dept: GENERAL RADIOLOGY | Age: 82
Discharge: HOME | End: 2022-09-08
Payer: MEDICARE

## 2022-09-08 DIAGNOSIS — D16.21 ENCHONDROMA OF FEMUR, RIGHT: ICD-10-CM

## 2022-09-08 PROCEDURE — 73562 X-RAY EXAM OF KNEE 3: CPT

## 2022-09-09 ENCOUNTER — TELEPHONE (OUTPATIENT)
Dept: FAMILY MEDICINE CLINIC | Age: 82
End: 2022-09-09

## 2022-09-09 NOTE — TELEPHONE ENCOUNTER
----- Message from Sajan Ha sent at 9/9/2022  4:08 PM EDT -----  Subject: Message to Provider    QUESTIONS  Information for Provider? Patient called back and just wanted the message   to get to Jaswinder Peterson that she is not having and issues with her   knee and if her hip gives her any more trouble se will call back and get   information on what she suggested to do.   ---------------------------------------------------------------------------  --------------  [id number] [employer]  [phone]; OK to leave message on voicemail  ---------------------------------------------------------------------------  --------------  SCRIPT ANSWERS  Relationship to Patient?  Self

## 2022-09-09 NOTE — PROGRESS NOTES
I called the patient and reviewed her results with her. She reports R knee pain comes and goes as well as bilateral leg aching, R hip pain. She has not tried any medications for this. I recommended Tylenol as first line. We can also get her in with PT or Dr. Marifer Barnes.

## 2022-11-10 DIAGNOSIS — E78.5 HYPERLIPIDEMIA, UNSPECIFIED HYPERLIPIDEMIA TYPE: ICD-10-CM

## 2022-11-11 RX ORDER — ATORVASTATIN CALCIUM 40 MG/1
TABLET, FILM COATED ORAL
Qty: 90 TABLET | Refills: 3 | Status: SHIPPED | OUTPATIENT
Start: 2022-11-11

## 2022-12-05 DIAGNOSIS — I10 ESSENTIAL HYPERTENSION: ICD-10-CM

## 2022-12-06 RX ORDER — VALSARTAN 40 MG/1
40 TABLET ORAL DAILY
Qty: 90 TABLET | Refills: 3 | Status: SHIPPED | OUTPATIENT
Start: 2022-12-06

## 2022-12-28 DIAGNOSIS — I10 ESSENTIAL HYPERTENSION: ICD-10-CM

## 2022-12-30 RX ORDER — METOPROLOL SUCCINATE 50 MG/1
50 TABLET, EXTENDED RELEASE ORAL DAILY
Qty: 90 TABLET | Refills: 3 | Status: SHIPPED | OUTPATIENT
Start: 2022-12-30

## 2023-02-17 ENCOUNTER — OFFICE VISIT (OUTPATIENT)
Dept: FAMILY MEDICINE CLINIC | Age: 83
End: 2023-02-17
Payer: MEDICARE

## 2023-02-17 VITALS
TEMPERATURE: 98.3 F | HEART RATE: 59 BPM | RESPIRATION RATE: 17 BRPM | OXYGEN SATURATION: 97 % | BODY MASS INDEX: 30.77 KG/M2 | HEIGHT: 64 IN | SYSTOLIC BLOOD PRESSURE: 138 MMHG | WEIGHT: 180.2 LBS | DIASTOLIC BLOOD PRESSURE: 78 MMHG

## 2023-02-17 DIAGNOSIS — J30.2 SEASONAL ALLERGIC RHINITIS, UNSPECIFIED TRIGGER: ICD-10-CM

## 2023-02-17 DIAGNOSIS — R73.03 PRE-DIABETES: ICD-10-CM

## 2023-02-17 DIAGNOSIS — M25.551 RIGHT HIP PAIN: ICD-10-CM

## 2023-02-17 DIAGNOSIS — I10 ESSENTIAL HYPERTENSION: Primary | ICD-10-CM

## 2023-02-17 DIAGNOSIS — E78.5 HYPERLIPIDEMIA, UNSPECIFIED HYPERLIPIDEMIA TYPE: ICD-10-CM

## 2023-02-17 RX ORDER — CETIRIZINE HYDROCHLORIDE 10 MG/1
10 TABLET ORAL DAILY
Qty: 30 TABLET | Refills: 1 | Status: SHIPPED | OUTPATIENT
Start: 2023-02-17

## 2023-02-17 NOTE — PROGRESS NOTES
Chief Complaint   Patient presents with    Medication Evaluation     6mth check up         HPI:    Penny Bass is a [de-identified] y.o. female for a check up. Anxiety/depression - Stable. Hyponatremia - Stable, no longer on sodium supplements. Hypertension - On metoprolol QAM, valsartan QPM. Released from Dr. Charlette butler, will need cardiology f/u in 1 year. Venous insufficiency - follows with vascular through [facility]. Leaky valves, stable. Edema has greatly improved since stopping amlodipine. New issues: Patient has singles last year and continues with intermittent neuropathic symptoms. They are annoying but tolerable. She also reports a 6 month hx of sinus drainage, sneezing. She uses OTC allergy medications intermittently but not regularly. Lastly, she reports R groin pain when lifting her R leg x several months. She denies any injury. Allergies   Allergen Reactions    Bactrim [Sulfamethoprim Ss] Nausea Only and Other (comments)     Blisters On  Lip     Ciprofloxacin Hives    Lisinopril Other (comments)     Felt bad       Current Outpatient Medications   Medication Sig    cetirizine (ZYRTEC) 10 mg tablet Take 1 Tablet by mouth daily. metoprolol succinate (TOPROL-XL) 50 mg XL tablet TAKE 1 TABLET BY MOUTH  DAILY    valsartan (DIOVAN) 40 mg tablet TAKE 1 TABLET BY MOUTH  DAILY AT BEDTIME    atorvastatin (LIPITOR) 40 mg tablet TAKE 1 TABLET BY MOUTH  DAILY    pantoprazole (PROTONIX) 40 mg tablet TAKE 1 TABLET BY MOUTH  DAILY    hydrOXYzine HCL (ATARAX) 25 mg tablet 1-2 tablets PO TID PRN anxiety    cholecalciferol (VITAMIN D3) 25 mcg (1,000 unit) cap Take 2 Caps by mouth daily. aspirin delayed-release 81 mg tablet Take  by mouth daily. No current facility-administered medications for this visit.        Past Medical History:   Diagnosis Date    Edema     Hypercholesterolemia     Hypertension     Insomnia, unspecified     Osteopenia     Prediabetes     Unspecified vitamin D deficiency        Family History Problem Relation Age of Onset    Hypertension Mother     Diabetes Mother     Hypertension Father     Kidney Disease Father     Diabetes Sister     Diabetes Brother        ROS:  Denies fever, chills, cough, chest pain, SOB,  nausea, vomiting, diarrhea, dysuria. Denies rashes, wounds, +arthralgias,  weakness, numbness, visual changes, depression. Denies wt loss, +wt gain, hemoptysis, hematochezia or melena. Patient is not experiencing chest pain radiating to the jaw and/or down the arms. Physical Examination:    /78 (BP 1 Location: Right arm, BP Patient Position: Sitting, BP Cuff Size: Adult long)   Pulse (!) 59   Temp 98.3 °F (36.8 °C) (Oral)   Resp 17   Ht 5' 4\" (1.626 m)   Wt 180 lb 3.2 oz (81.7 kg)   SpO2 97%   BMI 30.93 kg/m²     Wt Readings from Last 3 Encounters:   02/17/23 180 lb 3.2 oz (81.7 kg)   08/12/22 173 lb 3.2 oz (78.6 kg)   03/04/22 172 lb 9.6 oz (78.3 kg)     Constitutional: WDWN Female in no acute distress  HENT:  NC/AT, TM pearly gray, OP: normal  EYES: EOMI, PERRL  Neck:  Supple, no JVD, mass or bruit. No thyromegaly. Respiratory:  Respirations even and unlabored without use of accessory muscles, CTA throughout without wheezes, rales, rubs or rhonchi. Symmetrical chest expansion. Cardiac: RRR no clicks, murmurs. Musculoskeletal:  No cyanosis, clubbing. Moves all extremities without difficulty. Trace edema LLE. R inguinal pain with lifting of R leg actively or passively. Joint is non-tender. ROM intact. Neurologic:  Smooth, even gait without assistance, CN 2-12 grossly intact. Skin: intact and warm to the touch, no rash   Lymphadenopathy: no cervical or supraclavicular nodes  Psych: Pleasant and appropriate. Judgment normal. Alert and oriented x 3. ASSESSMENT AND PLAN:       ICD-10-CM ICD-9-CM    1.  Essential hypertension  I10 401.9 AR COLLECTION VENOUS BLOOD VENIPUNCTURE      CBC WITH AUTOMATED DIFF      LIPID PANEL      METABOLIC PANEL, COMPREHENSIVE      TSH 3RD GENERATION      TSH 3RD GENERATION      METABOLIC PANEL, COMPREHENSIVE      LIPID PANEL      CBC WITH AUTOMATED DIFF      ID COLLECTION VENOUS BLOOD VENIPUNCTURE      2. Seasonal allergic rhinitis, unspecified trigger  J30.2 477.9 cetirizine (ZYRTEC) 10 mg tablet      3. Right hip pain  M25.551 719.45 REFERRAL TO PHYSICAL THERAPY      4. Hyperlipidemia, unspecified hyperlipidemia type  E78.5 272.4       5. Pre-diabetes  R73.03 790.29 ID COLLECTION VENOUS BLOOD VENIPUNCTURE      HEMOGLOBIN A1C WITH EAG      HEMOGLOBIN A1C WITH EAG      ID COLLECTION VENOUS BLOOD VENIPUNCTURE        Suspect OA in R hip. She opts for PT at present, referral placed. Check up labs as above. BP at goal. Asymptomatic bradycardia, chronic. Start daily cetirizine for allergy symptoms. Plan to add Astelin if symptoms persist.    Patient aware of plan of care and verbalized understanding. Questions answered. RTC in 6 months, or sooner if needed.     Christine Alba NP

## 2023-02-17 NOTE — PROGRESS NOTES
YES Answers must have Comments  1. \"Have you been to the ER, urgent care clinic since your last visit? Hospitalized since your last visit? \"    [] YES   [x] NO       2. Have you seen or consulted any other health care providers outside of [address] [place] [address] since your last visit?     [] YES   [x] NO       3. For patients aged 39-70: Have you had a colorectal cancer screening such as a colonoscopy/FIT/Cologuard? Nurse/CMA to request records if not in chart   [x] YES Where [facility]  [] NO   [] NA, based on age    If the patient is female:      4. For female patients aged 41-77: Laina Shine you had a mammogram in the last two years?  Nurse/CMA to request records if not in chart   [x] YES Where [facility]  [] NO   [] NA, based on age    11. For female patients aged 21-65: Laina Andradey you had a pap smear?   Nurse/CMA to request records if not in chart   [] YES   [] NO  [x] NA, based on age

## 2023-02-18 LAB
ALBUMIN SERPL-MCNC: 4.2 G/DL (ref 3.5–5)
ALBUMIN/GLOB SERPL: 1.4 (ref 1.1–2.2)
ALP SERPL-CCNC: 80 U/L (ref 45–117)
ALT SERPL-CCNC: 24 U/L (ref 12–78)
ANION GAP SERPL CALC-SCNC: 7 MMOL/L (ref 5–15)
AST SERPL-CCNC: 22 U/L (ref 15–37)
BASOPHILS # BLD: 0.1 K/UL (ref 0–0.1)
BASOPHILS NFR BLD: 1 % (ref 0–1)
BILIRUB SERPL-MCNC: 0.5 MG/DL (ref 0.2–1)
BUN SERPL-MCNC: 25 MG/DL (ref 6–20)
BUN/CREAT SERPL: 17 (ref 12–20)
CALCIUM SERPL-MCNC: 9.7 MG/DL (ref 8.5–10.1)
CHLORIDE SERPL-SCNC: 100 MMOL/L (ref 97–108)
CHOLEST SERPL-MCNC: 147 MG/DL
CO2 SERPL-SCNC: 27 MMOL/L (ref 21–32)
CREAT SERPL-MCNC: 1.44 MG/DL (ref 0.55–1.02)
DIFFERENTIAL METHOD BLD: ABNORMAL
EOSINOPHIL # BLD: 0.4 K/UL (ref 0–0.4)
EOSINOPHIL NFR BLD: 5 % (ref 0–7)
ERYTHROCYTE [DISTWIDTH] IN BLOOD BY AUTOMATED COUNT: 12.7 % (ref 11.5–14.5)
EST. AVERAGE GLUCOSE BLD GHB EST-MCNC: 123 MG/DL
GLOBULIN SER CALC-MCNC: 3.1 G/DL (ref 2–4)
GLUCOSE SERPL-MCNC: 98 MG/DL (ref 65–100)
HBA1C MFR BLD: 5.9 % (ref 4–5.6)
HCT VFR BLD AUTO: 38.5 % (ref 35–47)
HDLC SERPL-MCNC: 45 MG/DL
HDLC SERPL: 3.3 (ref 0–5)
HGB BLD-MCNC: 12.3 G/DL (ref 11.5–16)
IMM GRANULOCYTES # BLD AUTO: 0.1 K/UL (ref 0–0.04)
IMM GRANULOCYTES NFR BLD AUTO: 1 % (ref 0–0.5)
LDLC SERPL CALC-MCNC: 67.4 MG/DL (ref 0–100)
LYMPHOCYTES # BLD: 2 K/UL (ref 0.8–3.5)
LYMPHOCYTES NFR BLD: 26 % (ref 12–49)
MCH RBC QN AUTO: 29.5 PG (ref 26–34)
MCHC RBC AUTO-ENTMCNC: 31.9 G/DL (ref 30–36.5)
MCV RBC AUTO: 92.3 FL (ref 80–99)
MONOCYTES # BLD: 0.7 K/UL (ref 0–1)
MONOCYTES NFR BLD: 9 % (ref 5–13)
NEUTS SEG # BLD: 4.6 K/UL (ref 1.8–8)
NEUTS SEG NFR BLD: 58 % (ref 32–75)
NRBC # BLD: 0 K/UL (ref 0–0.01)
NRBC BLD-RTO: 0 PER 100 WBC
PLATELET # BLD AUTO: 231 K/UL (ref 150–400)
PMV BLD AUTO: 10 FL (ref 8.9–12.9)
POTASSIUM SERPL-SCNC: 5 MMOL/L (ref 3.5–5.1)
PROT SERPL-MCNC: 7.3 G/DL (ref 6.4–8.2)
RBC # BLD AUTO: 4.17 M/UL (ref 3.8–5.2)
SODIUM SERPL-SCNC: 134 MMOL/L (ref 136–145)
TRIGL SERPL-MCNC: 173 MG/DL (ref ?–150)
TSH SERPL DL<=0.05 MIU/L-ACNC: 2.98 UIU/ML (ref 0.36–3.74)
VLDLC SERPL CALC-MCNC: 34.6 MG/DL
WBC # BLD AUTO: 7.7 K/UL (ref 3.6–11)

## 2023-02-21 ENCOUNTER — TELEPHONE (OUTPATIENT)
Dept: FAMILY MEDICINE CLINIC | Age: 83
End: 2023-02-21

## 2023-02-21 NOTE — TELEPHONE ENCOUNTER
Spoke with patient, she was returning call regarding lab results. Pt expressed understanding and had no further questions. Pt requested copy in the mail. Confirmed mailing address.  TARA

## 2023-04-04 ENCOUNTER — TELEPHONE (OUTPATIENT)
Dept: FAMILY MEDICINE CLINIC | Age: 83
End: 2023-04-04

## 2023-04-07 ENCOUNTER — OFFICE VISIT (OUTPATIENT)
Dept: FAMILY MEDICINE CLINIC | Age: 83
End: 2023-04-07

## 2023-04-24 DIAGNOSIS — K21.9 GASTROESOPHAGEAL REFLUX DISEASE WITHOUT ESOPHAGITIS: ICD-10-CM

## 2023-04-25 RX ORDER — PANTOPRAZOLE SODIUM 40 MG/1
TABLET, DELAYED RELEASE ORAL
Qty: 90 TABLET | Refills: 3 | Status: SHIPPED | OUTPATIENT
Start: 2023-04-25

## 2023-05-23 ENCOUNTER — NURSE TRIAGE (OUTPATIENT)
Dept: OTHER | Facility: CLINIC | Age: 83
End: 2023-05-23

## 2023-05-23 NOTE — TELEPHONE ENCOUNTER
Location of patient: [facility]    Received call from Sandoval at [facility] with [facility] staff. Subjective: Caller states \"my knee is hurting and at times hurts me so bad \"     Current Symptoms: left knee pain and mild swelling. Pt reports entire left leg is swollen     Onset: several weeks ago; worsening    Associated Symptoms: reduced activity, increased wakefulness    Pain Severity: 4/10; hurt; waxing and waning    Temperature: no fevers     What has been tried: therapy    LMP: NA Pregnant: NA    Recommended disposition: Go to ED/[facility] (Or to Office with PCP Approval)    Care advice provided, patient verbalizes understanding; denies any other questions or concerns; instructed to call back for any new or worsening symptoms. Writer provided warm transfer to Hilton at [facility] for 2nd level triage    Attention Provider: Thank you for allowing me to participate in the care of your patient. The patient was connected to triage in response to information provided to the [facility]. Please do not respond through this encounter as the response is not directed to a shared pool.       Reason for Disposition   Thigh or calf swelling and only 1 side    Protocols used: Knee Pain-ADULT-[place]

## 2023-05-26 ENCOUNTER — OFFICE VISIT (OUTPATIENT)
Age: 83
End: 2023-05-26
Payer: MEDICARE

## 2023-05-26 VITALS
TEMPERATURE: 97.7 F | SYSTOLIC BLOOD PRESSURE: 138 MMHG | DIASTOLIC BLOOD PRESSURE: 78 MMHG | OXYGEN SATURATION: 97 % | WEIGHT: 176.8 LBS | RESPIRATION RATE: 17 BRPM | HEART RATE: 61 BPM | BODY MASS INDEX: 30.19 KG/M2 | HEIGHT: 64 IN

## 2023-05-26 DIAGNOSIS — M25.562 CHRONIC PAIN OF LEFT KNEE: Primary | ICD-10-CM

## 2023-05-26 DIAGNOSIS — G89.29 CHRONIC PAIN OF LEFT KNEE: Primary | ICD-10-CM

## 2023-05-26 PROBLEM — N18.30 CHRONIC RENAL DISEASE, STAGE III (HCC): Status: ACTIVE | Noted: 2023-05-26

## 2023-05-26 PROCEDURE — 99213 OFFICE O/P EST LOW 20 MIN: CPT | Performed by: NURSE PRACTITIONER

## 2023-05-26 PROCEDURE — G8417 CALC BMI ABV UP PARAM F/U: HCPCS | Performed by: NURSE PRACTITIONER

## 2023-05-26 PROCEDURE — G8427 DOCREV CUR MEDS BY ELIG CLIN: HCPCS | Performed by: NURSE PRACTITIONER

## 2023-05-26 PROCEDURE — 1036F TOBACCO NON-USER: CPT | Performed by: NURSE PRACTITIONER

## 2023-05-26 PROCEDURE — G8399 PT W/DXA RESULTS DOCUMENT: HCPCS | Performed by: NURSE PRACTITIONER

## 2023-05-26 PROCEDURE — 1123F ACP DISCUSS/DSCN MKR DOCD: CPT | Performed by: NURSE PRACTITIONER

## 2023-05-26 PROCEDURE — 1090F PRES/ABSN URINE INCON ASSESS: CPT | Performed by: NURSE PRACTITIONER

## 2023-05-26 PROCEDURE — 3075F SYST BP GE 130 - 139MM HG: CPT | Performed by: NURSE PRACTITIONER

## 2023-05-26 PROCEDURE — 3078F DIAST BP <80 MM HG: CPT | Performed by: NURSE PRACTITIONER

## 2023-05-26 SDOH — ECONOMIC STABILITY: INCOME INSECURITY: IN THE LAST 12 MONTHS, WAS THERE A TIME WHEN YOU WERE NOT ABLE TO PAY THE MORTGAGE OR RENT ON TIME?: NO

## 2023-05-26 SDOH — ECONOMIC STABILITY: HOUSING INSECURITY
IN THE LAST 12 MONTHS, WAS THERE A TIME WHEN YOU DID NOT HAVE A STEADY PLACE TO SLEEP OR SLEPT IN A SHELTER (INCLUDING NOW)?: NO

## 2023-05-26 ASSESSMENT — PATIENT HEALTH QUESTIONNAIRE - PHQ9
SUM OF ALL RESPONSES TO PHQ QUESTIONS 1-9: 0
SUM OF ALL RESPONSES TO PHQ QUESTIONS 1-9: 0
SUM OF ALL RESPONSES TO PHQ9 QUESTIONS 1 & 2: 0
SUM OF ALL RESPONSES TO PHQ QUESTIONS 1-9: 0
2. FEELING DOWN, DEPRESSED OR HOPELESS: 0
SUM OF ALL RESPONSES TO PHQ QUESTIONS 1-9: 0
1. LITTLE INTEREST OR PLEASURE IN DOING THINGS: 0

## 2023-05-26 NOTE — PROGRESS NOTES
1. \"Have you been to the ER, urgent care clinic since your last visit? Hospitalized since your last visit? \" No    2. \"Have you seen or consulted any other health care providers outside of the [address] [place] [address] since your last visit? \" No     3. For patients aged 39-70: Has the patient had a colonoscopy / FIT/ Cologuard? Yes - Care Gap present. Most recent result on file     If the patient is female:    4. For patients aged 41-77: Has the patient had a mammogram within the past 2 years? Yes - Care Gap present. Most recent result on file    5. For patients aged 21-65: Has the patient had a pap smear?  NA - based on age
tablet Take 1 tablet by mouth daily Yes Ar Automatic Reconciliation   pantoprazole (PROTONIX) 40 MG tablet Take 1 tablet by mouth daily Yes Ar Automatic Reconciliation   valsartan (DIOVAN) 40 MG tablet Take 1 tablet by mouth daily Yes Ar Automatic Reconciliation       Past Medical History:   Diagnosis Date    Edema     Hypercholesterolemia     Hypertension     Insomnia, unspecified     Osteopenia     Prediabetes     Unspecified vitamin D deficiency        Family History   Problem Relation Age of Onset    Hypertension Mother     Hypertension Father     Diabetes Brother     Diabetes Sister     Kidney Disease Father     Diabetes Mother          Review of Systems    A comprehensive review of systems was negative except for that written in the HPI. Patient is not experiencing chest pain radiating to the jaw and/or down the arms. Physical Examination:    Vitals:    05/26/23 1123   BP: 138/78   Site: Right Upper Arm   Position: Sitting   Cuff Size: Medium Adult   Pulse: 61   Resp: 17   Temp: 97.7 °F (36.5 °C)   TempSrc: Oral   SpO2: 97%   Weight: 176 lb 12.8 oz (80.2 kg)   Height: 5' 4\" (1.626 m)      Body mass index is 30.35 kg/m². Wt Readings from Last 3 Encounters:   05/26/23 176 lb 12.8 oz (80.2 kg)   04/07/23 181 lb 9.6 oz (82.4 kg)   02/17/23 180 lb 3.2 oz (81.7 kg)       Constitutional: WDWN Female in no acute distress  HENT:  NC/AT  Respiratory:  Respirations even and unlabored without use of accessory muscles, CTA throughout without wheezes, rales, rubs or rhonchi. Symmetrical chest expansion. Cardiac: RRR  Musculoskeletal:  No cyanosis, clubbing or edema of extremities. Moves all extremities without difficulty. Posterior tibial pulse is 2+., L knee with medial tenderness, no effusion, warmth. Stable gait. Neurologic:  Smooth, even gait without assistance, CN 2-12 grossly intact.     Skin: intact and warm to the touch, no rash   Lymphadenopathy: no cervical or supraclavicular nodes  Psych: Pleasant and

## 2023-06-29 ENCOUNTER — OFFICE VISIT (OUTPATIENT)
Age: 83
End: 2023-06-29
Payer: MEDICARE

## 2023-06-29 VITALS
WEIGHT: 178.6 LBS | BODY MASS INDEX: 30.49 KG/M2 | HEIGHT: 64 IN | SYSTOLIC BLOOD PRESSURE: 130 MMHG | OXYGEN SATURATION: 99 % | DIASTOLIC BLOOD PRESSURE: 70 MMHG | RESPIRATION RATE: 17 BRPM | HEART RATE: 64 BPM | TEMPERATURE: 97.6 F

## 2023-06-29 DIAGNOSIS — R10.33 PERIUMBILICAL ABDOMINAL PAIN: Primary | ICD-10-CM

## 2023-06-29 DIAGNOSIS — N18.31 STAGE 3A CHRONIC KIDNEY DISEASE (HCC): ICD-10-CM

## 2023-06-29 PROCEDURE — 3074F SYST BP LT 130 MM HG: CPT | Performed by: NURSE PRACTITIONER

## 2023-06-29 PROCEDURE — G8399 PT W/DXA RESULTS DOCUMENT: HCPCS | Performed by: NURSE PRACTITIONER

## 2023-06-29 PROCEDURE — 3078F DIAST BP <80 MM HG: CPT | Performed by: NURSE PRACTITIONER

## 2023-06-29 PROCEDURE — G8417 CALC BMI ABV UP PARAM F/U: HCPCS | Performed by: NURSE PRACTITIONER

## 2023-06-29 PROCEDURE — G8427 DOCREV CUR MEDS BY ELIG CLIN: HCPCS | Performed by: NURSE PRACTITIONER

## 2023-06-29 PROCEDURE — 1036F TOBACCO NON-USER: CPT | Performed by: NURSE PRACTITIONER

## 2023-06-29 PROCEDURE — 1123F ACP DISCUSS/DSCN MKR DOCD: CPT | Performed by: NURSE PRACTITIONER

## 2023-06-29 PROCEDURE — 99213 OFFICE O/P EST LOW 20 MIN: CPT | Performed by: NURSE PRACTITIONER

## 2023-06-29 PROCEDURE — 1090F PRES/ABSN URINE INCON ASSESS: CPT | Performed by: NURSE PRACTITIONER

## 2023-06-29 SDOH — ECONOMIC STABILITY: INCOME INSECURITY: IN THE LAST 12 MONTHS, WAS THERE A TIME WHEN YOU WERE NOT ABLE TO PAY THE MORTGAGE OR RENT ON TIME?: NO

## 2023-06-29 ASSESSMENT — PATIENT HEALTH QUESTIONNAIRE - PHQ9
1. LITTLE INTEREST OR PLEASURE IN DOING THINGS: 0
SUM OF ALL RESPONSES TO PHQ QUESTIONS 1-9: 0
SUM OF ALL RESPONSES TO PHQ QUESTIONS 1-9: 0
2. FEELING DOWN, DEPRESSED OR HOPELESS: 0
SUM OF ALL RESPONSES TO PHQ QUESTIONS 1-9: 0
SUM OF ALL RESPONSES TO PHQ9 QUESTIONS 1 & 2: 0
SUM OF ALL RESPONSES TO PHQ QUESTIONS 1-9: 0

## 2023-07-06 ENCOUNTER — HOSPITAL ENCOUNTER (OUTPATIENT)
Facility: HOSPITAL | Age: 83
Discharge: HOME | End: 2023-07-06
Payer: MEDICARE

## 2023-07-06 DIAGNOSIS — R10.33 PERIUMBILICAL ABDOMINAL PAIN: ICD-10-CM

## 2023-07-06 PROCEDURE — 76700 US EXAM ABDOM COMPLETE: CPT

## 2023-07-12 ENCOUNTER — TELEPHONE (OUTPATIENT)
Age: 83
End: 2023-07-12

## 2023-07-12 DIAGNOSIS — K80.20 CALCULUS OF GALLBLADDER WITHOUT CHOLECYSTITIS WITHOUT OBSTRUCTION: ICD-10-CM

## 2023-07-12 DIAGNOSIS — R10.33 PERIUMBILICAL ABDOMINAL PAIN: Primary | ICD-10-CM

## 2023-07-12 NOTE — TELEPHONE ENCOUNTER
S/w patient she would like to stay in [facility] system but is willing to go to [facility] or [facility]

## 2023-07-12 NOTE — TELEPHONE ENCOUNTER
Pt states that she called Dr. Julio Griffin office and they cannot get her in until after Dec. She would like to get a referral to another gastro that she would be able to get into sooner.  Please advise

## 2023-07-13 NOTE — TELEPHONE ENCOUNTER
[facility] doesn't have a GI department, we use Dr. Eduardo bautista. They are affiliated. I've changed the referral to Dr. Rosa Ramos with [facility], [phone].

## 2023-08-18 ENCOUNTER — OFFICE VISIT (OUTPATIENT)
Age: 83
End: 2023-08-18
Payer: MEDICARE

## 2023-08-18 VITALS
SYSTOLIC BLOOD PRESSURE: 150 MMHG | HEIGHT: 64 IN | HEART RATE: 55 BPM | OXYGEN SATURATION: 98 % | BODY MASS INDEX: 29.84 KG/M2 | WEIGHT: 174.8 LBS | DIASTOLIC BLOOD PRESSURE: 82 MMHG

## 2023-08-18 DIAGNOSIS — Z91.81 AT HIGH RISK FOR FALLS: ICD-10-CM

## 2023-08-18 DIAGNOSIS — E78.2 MIXED HYPERLIPIDEMIA: ICD-10-CM

## 2023-08-18 DIAGNOSIS — Z00.00 MEDICARE ANNUAL WELLNESS VISIT, SUBSEQUENT: Primary | ICD-10-CM

## 2023-08-18 DIAGNOSIS — E55.9 VITAMIN D DEFICIENCY: ICD-10-CM

## 2023-08-18 DIAGNOSIS — R73.03 PREDIABETES: ICD-10-CM

## 2023-08-18 DIAGNOSIS — K80.20 CALCULUS OF GALLBLADDER WITHOUT CHOLECYSTITIS WITHOUT OBSTRUCTION: ICD-10-CM

## 2023-08-18 DIAGNOSIS — I10 ESSENTIAL (PRIMARY) HYPERTENSION: ICD-10-CM

## 2023-08-18 PROCEDURE — G0439 PPPS, SUBSEQ VISIT: HCPCS | Performed by: NURSE PRACTITIONER

## 2023-08-18 PROCEDURE — 3077F SYST BP >= 140 MM HG: CPT | Performed by: NURSE PRACTITIONER

## 2023-08-18 PROCEDURE — 3079F DIAST BP 80-89 MM HG: CPT | Performed by: NURSE PRACTITIONER

## 2023-08-18 PROCEDURE — 1123F ACP DISCUSS/DSCN MKR DOCD: CPT | Performed by: NURSE PRACTITIONER

## 2023-08-18 PROCEDURE — 36415 COLL VENOUS BLD VENIPUNCTURE: CPT | Performed by: NURSE PRACTITIONER

## 2023-08-18 ASSESSMENT — PATIENT HEALTH QUESTIONNAIRE - PHQ9
SUM OF ALL RESPONSES TO PHQ QUESTIONS 1-9: 0
SUM OF ALL RESPONSES TO PHQ QUESTIONS 1-9: 0
1. LITTLE INTEREST OR PLEASURE IN DOING THINGS: 0
SUM OF ALL RESPONSES TO PHQ QUESTIONS 1-9: 0
SUM OF ALL RESPONSES TO PHQ QUESTIONS 1-9: 0
SUM OF ALL RESPONSES TO PHQ9 QUESTIONS 1 & 2: 0
2. FEELING DOWN, DEPRESSED OR HOPELESS: 0

## 2023-08-18 ASSESSMENT — LIFESTYLE VARIABLES
HOW OFTEN DO YOU HAVE A DRINK CONTAINING ALCOHOL: NEVER
HOW MANY STANDARD DRINKS CONTAINING ALCOHOL DO YOU HAVE ON A TYPICAL DAY: PATIENT DOES NOT DRINK

## 2023-08-18 NOTE — PROGRESS NOTES
Labs drawn from right arm  per nerissa's orders. Patient tolerated well. 1. \"Have you been to the ER, urgent care clinic since your last visit? Hospitalized since your last visit? \" No    2. \"Have you seen or consulted any other health care providers outside of the [address] [place] [address] since your last visit? \" No     3. For patients aged 43-73: Has the patient had a colonoscopy / FIT/ Cologuard? NA - based on age     If the patient is female:    4. For patients aged 43-66: Has the patient had a mammogram within the past 2 years? NA - based on age    11. For patients aged 21-65: Has the patient had a pap smear? NA - based on age    Chief Complaint   Patient presents with    Medicare AWV       There were no vitals filed for this visit.

## 2023-08-18 NOTE — PROGRESS NOTES
Chief Complaint   Patient presents with    Medicare AWV       ASSESSMENT AND PLAN:      Diagnosis Orders   1. Medicare annual wellness visit, subsequent        2. Calculus of gallbladder without cholecystitis without obstruction        3. Mixed hyperlipidemia        4. Prediabetes  SC COLLECTION VENOUS BLOOD VENIPUNCTURE    Hemoglobin A1C    Hemoglobin A1C      5. Essential (primary) hypertension  SC COLLECTION VENOUS BLOOD VENIPUNCTURE    Lipid Panel    Comprehensive Metabolic Panel    CBC with Auto Differential    TSH    TSH    CBC with Auto Differential    Comprehensive Metabolic Panel    Lipid Panel      6. Vitamin D deficiency  SC COLLECTION VENOUS BLOOD VENIPUNCTURE    Vitamin D 25 Hydroxy    Vitamin D 25 Hydroxy      7. At high risk for falls          See attached AWV. Check up labs today. Pt to schedule her 2 year follow up with cardiology, due in October. I reviewed notes from GI, agree with general surgery referral. Pt is not interested in a flu vaccine. I recommended she keep an eye out for her updated COVID booster as well. Patient aware of plan of care and verbalized understanding. Questions answered. RTC 6 months, or sooner if needed. HPI:    Prelsey Jauregui is a 82 y.o. female in today for her 6 month follow up and AWV. Anxiety/depression - Stable. Hyponatremia - Stable, no longer on sodium supplements. Hypertension - On metoprolol QAM, valsartan QPM. Released from Dr. Phil michael, will need cardiology f/u in 1 year. Venous insufficiency - follows with vascular through [facility]. Leaky valves, stable. Edema has greatly improved since stopping amlodipine. New issues: [facility] has been having some abdominal pain that started back in June. USN with cholelithiasis with a calculus possibly lodged in the gallbladder neck. .She saw gastroenterology, HIDA was normal and she was referred to general surgery. She was also started on probiotics. She will see Dr. Ibanez in the near future.  She has

## 2023-08-19 LAB
25(OH)D3 SERPL-MCNC: 65.9 NG/ML (ref 30–100)
ALBUMIN SERPL-MCNC: 4.4 G/DL (ref 3.5–5)
ALBUMIN/GLOB SERPL: 1.5 (ref 1.1–2.2)
ALP SERPL-CCNC: 85 U/L (ref 45–117)
ALT SERPL-CCNC: 26 U/L (ref 12–78)
ANION GAP SERPL CALC-SCNC: 4 MMOL/L (ref 5–15)
AST SERPL-CCNC: 20 U/L (ref 15–37)
BASOPHILS # BLD: 0.1 K/UL (ref 0–0.1)
BASOPHILS NFR BLD: 1 % (ref 0–1)
BILIRUB SERPL-MCNC: 0.8 MG/DL (ref 0.2–1)
BUN SERPL-MCNC: 18 MG/DL (ref 6–20)
BUN/CREAT SERPL: 14 (ref 12–20)
CALCIUM SERPL-MCNC: 9.5 MG/DL (ref 8.5–10.1)
CHLORIDE SERPL-SCNC: 99 MMOL/L (ref 97–108)
CHOLEST SERPL-MCNC: 122 MG/DL
CO2 SERPL-SCNC: 27 MMOL/L (ref 21–32)
CREAT SERPL-MCNC: 1.33 MG/DL (ref 0.55–1.02)
DIFFERENTIAL METHOD BLD: NORMAL
EOSINOPHIL # BLD: 0.3 K/UL (ref 0–0.4)
EOSINOPHIL NFR BLD: 5 % (ref 0–7)
ERYTHROCYTE [DISTWIDTH] IN BLOOD BY AUTOMATED COUNT: 12.6 % (ref 11.5–14.5)
EST. AVERAGE GLUCOSE BLD GHB EST-MCNC: 123 MG/DL
GLOBULIN SER CALC-MCNC: 2.9 G/DL (ref 2–4)
GLUCOSE SERPL-MCNC: 97 MG/DL (ref 65–100)
HBA1C MFR BLD: 5.9 % (ref 4–5.6)
HCT VFR BLD AUTO: 37.7 % (ref 35–47)
HDLC SERPL-MCNC: 44 MG/DL
HDLC SERPL: 2.8 (ref 0–5)
HGB BLD-MCNC: 11.8 G/DL (ref 11.5–16)
IMM GRANULOCYTES # BLD AUTO: 0 K/UL (ref 0–0.04)
IMM GRANULOCYTES NFR BLD AUTO: 0 % (ref 0–0.5)
LDLC SERPL CALC-MCNC: 46.8 MG/DL (ref 0–100)
LYMPHOCYTES # BLD: 1.9 K/UL (ref 0.8–3.5)
LYMPHOCYTES NFR BLD: 26 % (ref 12–49)
MCH RBC QN AUTO: 29.9 PG (ref 26–34)
MCHC RBC AUTO-ENTMCNC: 31.3 G/DL (ref 30–36.5)
MCV RBC AUTO: 95.7 FL (ref 80–99)
MONOCYTES # BLD: 0.5 K/UL (ref 0–1)
MONOCYTES NFR BLD: 6 % (ref 5–13)
NEUTS SEG # BLD: 4.4 K/UL (ref 1.8–8)
NEUTS SEG NFR BLD: 62 % (ref 32–75)
NRBC # BLD: 0 K/UL (ref 0–0.01)
NRBC BLD-RTO: 0 PER 100 WBC
PLATELET # BLD AUTO: 258 K/UL (ref 150–400)
PMV BLD AUTO: 9.8 FL (ref 8.9–12.9)
POTASSIUM SERPL-SCNC: 4.7 MMOL/L (ref 3.5–5.1)
PROT SERPL-MCNC: 7.3 G/DL (ref 6.4–8.2)
RBC # BLD AUTO: 3.94 M/UL (ref 3.8–5.2)
SODIUM SERPL-SCNC: 130 MMOL/L (ref 136–145)
TRIGL SERPL-MCNC: 156 MG/DL
TSH SERPL DL<=0.05 MIU/L-ACNC: 2.53 UIU/ML (ref 0.36–3.74)
VLDLC SERPL CALC-MCNC: 31.2 MG/DL
WBC # BLD AUTO: 7.1 K/UL (ref 3.6–11)

## 2023-08-21 ENCOUNTER — OFFICE VISIT (OUTPATIENT)
Age: 83
End: 2023-08-21
Payer: MEDICARE

## 2023-08-21 VITALS
WEIGHT: 174 LBS | SYSTOLIC BLOOD PRESSURE: 130 MMHG | BODY MASS INDEX: 29.71 KG/M2 | DIASTOLIC BLOOD PRESSURE: 72 MMHG | HEART RATE: 70 BPM | RESPIRATION RATE: 18 BRPM | TEMPERATURE: 97.2 F | OXYGEN SATURATION: 98 % | HEIGHT: 64 IN

## 2023-08-21 DIAGNOSIS — B02.9 HERPES ZOSTER WITHOUT COMPLICATION: Primary | ICD-10-CM

## 2023-08-21 PROCEDURE — 99213 OFFICE O/P EST LOW 20 MIN: CPT | Performed by: NURSE PRACTITIONER

## 2023-08-21 PROCEDURE — 1123F ACP DISCUSS/DSCN MKR DOCD: CPT | Performed by: NURSE PRACTITIONER

## 2023-08-21 PROCEDURE — G8427 DOCREV CUR MEDS BY ELIG CLIN: HCPCS | Performed by: NURSE PRACTITIONER

## 2023-08-21 PROCEDURE — 3074F SYST BP LT 130 MM HG: CPT | Performed by: NURSE PRACTITIONER

## 2023-08-21 PROCEDURE — 1036F TOBACCO NON-USER: CPT | Performed by: NURSE PRACTITIONER

## 2023-08-21 PROCEDURE — 1090F PRES/ABSN URINE INCON ASSESS: CPT | Performed by: NURSE PRACTITIONER

## 2023-08-21 PROCEDURE — G8399 PT W/DXA RESULTS DOCUMENT: HCPCS | Performed by: NURSE PRACTITIONER

## 2023-08-21 PROCEDURE — G8417 CALC BMI ABV UP PARAM F/U: HCPCS | Performed by: NURSE PRACTITIONER

## 2023-08-21 PROCEDURE — 3078F DIAST BP <80 MM HG: CPT | Performed by: NURSE PRACTITIONER

## 2023-08-21 RX ORDER — VALACYCLOVIR HYDROCHLORIDE 1 G/1
1000 TABLET, FILM COATED ORAL 2 TIMES DAILY
Qty: 14 TABLET | Refills: 0 | Status: SHIPPED | OUTPATIENT
Start: 2023-08-21 | End: 2023-08-28

## 2023-08-21 NOTE — PROGRESS NOTES
Chief Complaint   Patient presents with    Rash     Under right arm     Chief Complaint   Patient presents with    Rash     Under right arm

## 2023-08-23 NOTE — PROGRESS NOTES
Subjective: Johnson Hernández is a 82 y.o. female who presents for evaluation of rash involving the trunk and right side of her bra line. . Rash started a few days ago. Lesions are pink in color, and raised in texture. Rash has changed over time. Rash is painful and is pruritic. Associated symptoms: none. Patient denies: fever, cough, sore throat, headache. Patient has not had contacts with similar rash. Patient has not had new exposures (soaps, lotions, laundry detergents, foods, medications, plants, insects or animals.)    Past Medical History:   Diagnosis Date    Edema     Hypercholesterolemia     Hypertension     Insomnia, unspecified     Osteopenia     Prediabetes     Unspecified vitamin D deficiency      Patient Active Problem List    Diagnosis Date Noted    Chronic renal disease, stage III [facility]) [[id number]] 05/26/2023    Hyponatremia 05/06/2019    ACP (advance care planning) 02/13/2017    Prediabetes     Pre-diabetes 01/29/2015    Hyperlipemia 08/29/2013    Hypertension 08/29/2013     Past Surgical History:   Procedure Laterality Date    TUBAL LIGATION       Family History   Problem Relation Age of Onset    Hypertension Mother     Hypertension Father     Diabetes Brother     Diabetes Sister     Kidney Disease Father     Diabetes Mother      Social History     Socioeconomic History    Marital status:       Spouse name: None    Number of children: None    Years of education: None    Highest education level: None   Tobacco Use    Smoking status: Former     Passive exposure: Past    Smokeless tobacco: Never   Vaping Use    Vaping Use: Never used   Substance and Sexual Activity    Alcohol use: No    Drug use: Never    Sexual activity: Not Currently     Partners: Male     Social Determinants of Health     Financial Resource Strain: Low Risk     Difficulty of Paying Living Expenses: Not hard at all   Food Insecurity: No Food Insecurity    Worried About [place] in the Last Year: Never true    Ran

## 2023-10-12 RX ORDER — ATORVASTATIN CALCIUM 40 MG/1
TABLET, FILM COATED ORAL DAILY
Qty: 90 TABLET | Refills: 3 | Status: SHIPPED | OUTPATIENT
Start: 2023-10-12

## 2023-10-17 ENCOUNTER — TELEPHONE (OUTPATIENT)
Age: 83
End: 2023-10-17

## 2023-11-02 ENCOUNTER — OFFICE VISIT (OUTPATIENT)
Age: 83
End: 2023-11-02
Payer: MEDICARE

## 2023-11-02 VITALS
HEIGHT: 64 IN | HEART RATE: 83 BPM | TEMPERATURE: 98.9 F | OXYGEN SATURATION: 95 % | BODY MASS INDEX: 29.44 KG/M2 | RESPIRATION RATE: 17 BRPM | DIASTOLIC BLOOD PRESSURE: 70 MMHG | WEIGHT: 172.45 LBS | SYSTOLIC BLOOD PRESSURE: 136 MMHG

## 2023-11-02 DIAGNOSIS — J06.9 VIRAL URI: Primary | ICD-10-CM

## 2023-11-02 PROCEDURE — 99213 OFFICE O/P EST LOW 20 MIN: CPT | Performed by: NURSE PRACTITIONER

## 2023-11-02 PROCEDURE — G8417 CALC BMI ABV UP PARAM F/U: HCPCS | Performed by: NURSE PRACTITIONER

## 2023-11-02 PROCEDURE — 1090F PRES/ABSN URINE INCON ASSESS: CPT | Performed by: NURSE PRACTITIONER

## 2023-11-02 PROCEDURE — G8484 FLU IMMUNIZE NO ADMIN: HCPCS | Performed by: NURSE PRACTITIONER

## 2023-11-02 PROCEDURE — 3074F SYST BP LT 130 MM HG: CPT | Performed by: NURSE PRACTITIONER

## 2023-11-02 PROCEDURE — G8399 PT W/DXA RESULTS DOCUMENT: HCPCS | Performed by: NURSE PRACTITIONER

## 2023-11-02 PROCEDURE — 1123F ACP DISCUSS/DSCN MKR DOCD: CPT | Performed by: NURSE PRACTITIONER

## 2023-11-02 PROCEDURE — 1036F TOBACCO NON-USER: CPT | Performed by: NURSE PRACTITIONER

## 2023-11-02 PROCEDURE — 3078F DIAST BP <80 MM HG: CPT | Performed by: NURSE PRACTITIONER

## 2023-11-02 PROCEDURE — G8427 DOCREV CUR MEDS BY ELIG CLIN: HCPCS | Performed by: NURSE PRACTITIONER

## 2023-11-02 RX ORDER — VALSARTAN 40 MG/1
40 TABLET ORAL
Qty: 90 TABLET | Refills: 3 | Status: SHIPPED | OUTPATIENT
Start: 2023-11-02

## 2023-11-02 ASSESSMENT — PATIENT HEALTH QUESTIONNAIRE - PHQ9
1. LITTLE INTEREST OR PLEASURE IN DOING THINGS: 0
SUM OF ALL RESPONSES TO PHQ9 QUESTIONS 1 & 2: 0
SUM OF ALL RESPONSES TO PHQ QUESTIONS 1-9: 0
2. FEELING DOWN, DEPRESSED OR HOPELESS: 0

## 2023-11-06 ENCOUNTER — OFFICE VISIT (OUTPATIENT)
Age: 83
End: 2023-11-06
Payer: MEDICARE

## 2023-11-06 VITALS
BODY MASS INDEX: 29.33 KG/M2 | TEMPERATURE: 97.3 F | RESPIRATION RATE: 18 BRPM | SYSTOLIC BLOOD PRESSURE: 110 MMHG | HEART RATE: 56 BPM | OXYGEN SATURATION: 98 % | DIASTOLIC BLOOD PRESSURE: 70 MMHG | WEIGHT: 171.8 LBS | HEIGHT: 64 IN

## 2023-11-06 DIAGNOSIS — U07.1 COVID: Primary | ICD-10-CM

## 2023-11-06 PROCEDURE — 1090F PRES/ABSN URINE INCON ASSESS: CPT | Performed by: NURSE PRACTITIONER

## 2023-11-06 PROCEDURE — G8427 DOCREV CUR MEDS BY ELIG CLIN: HCPCS | Performed by: NURSE PRACTITIONER

## 2023-11-06 PROCEDURE — G8417 CALC BMI ABV UP PARAM F/U: HCPCS | Performed by: NURSE PRACTITIONER

## 2023-11-06 PROCEDURE — 1123F ACP DISCUSS/DSCN MKR DOCD: CPT | Performed by: NURSE PRACTITIONER

## 2023-11-06 PROCEDURE — G8399 PT W/DXA RESULTS DOCUMENT: HCPCS | Performed by: NURSE PRACTITIONER

## 2023-11-06 PROCEDURE — G8484 FLU IMMUNIZE NO ADMIN: HCPCS | Performed by: NURSE PRACTITIONER

## 2023-11-06 PROCEDURE — 99214 OFFICE O/P EST MOD 30 MIN: CPT | Performed by: NURSE PRACTITIONER

## 2023-11-06 PROCEDURE — 3074F SYST BP LT 130 MM HG: CPT | Performed by: NURSE PRACTITIONER

## 2023-11-06 PROCEDURE — 1036F TOBACCO NON-USER: CPT | Performed by: NURSE PRACTITIONER

## 2023-11-06 PROCEDURE — 3078F DIAST BP <80 MM HG: CPT | Performed by: NURSE PRACTITIONER

## 2023-11-06 RX ORDER — DEXTROMETHORPHAN HYDROBROMIDE AND PROMETHAZINE HYDROCHLORIDE 15; 6.25 MG/5ML; MG/5ML
5 SYRUP ORAL 4 TIMES DAILY PRN
Qty: 180 ML | Refills: 0 | Status: SHIPPED | OUTPATIENT
Start: 2023-11-06 | End: 2023-11-13

## 2023-11-06 ASSESSMENT — PATIENT HEALTH QUESTIONNAIRE - PHQ9
SUM OF ALL RESPONSES TO PHQ QUESTIONS 1-9: 0
SUM OF ALL RESPONSES TO PHQ QUESTIONS 1-9: 0
1. LITTLE INTEREST OR PLEASURE IN DOING THINGS: 0
SUM OF ALL RESPONSES TO PHQ QUESTIONS 1-9: 0
SUM OF ALL RESPONSES TO PHQ9 QUESTIONS 1 & 2: 0
SUM OF ALL RESPONSES TO PHQ QUESTIONS 1-9: 0
2. FEELING DOWN, DEPRESSED OR HOPELESS: 0

## 2023-11-06 NOTE — PROGRESS NOTES
Chief Complaint   Patient presents with    Positive For Covid-19     Home test Saturday       ASSESSMENT AND PLAN:      Diagnosis Orders   1. COVID  nirmatrelvir/ritonavir 300/100 (PAXLOVID, 300/100,) 20 x 150 MG & 10 x 100MG TBPK    promethazine-dextromethorphan (PROMETHAZINE-DM) 6.25-15 MG/5ML syrup        Discussed COVID and tx options. Symptoms intensified on Saturday and she had her + test on Saturday. She is interested in antiviral treatment. Rx provided. Cough medication may cause sedation. Reviewed CDC infection control guidelines. Patient aware of plan of care and verbalized understanding. Questions answered. RTC as scheduled, or sooner if needed. HPI:    Kavin Sanchez is a 82 y.o. female in today for an acute visit. Anxiety/depression - Stable. Hyponatremia - Stable, no longer on sodium supplements. Hypertension - On metoprolol QAM, valsartan QPM. Released from Dr. Craft [facility]. Venous insufficiency - follows with vascular through [facility]. Leaky valves, stable. New issues: Seen last week with URI symptoms, tested + for COVID on Saturday. She reports drainage, cough. She notes chest congestion. Allergies   Allergen Reactions    Sulfa Antibiotics Nausea Only and Other (See Comments)     Blisters On  Lip     Ciprofloxacin Hives    Lisinopril Other (See Comments)     Felt bad       Prior to Visit Medications    Medication Sig Taking? Authorizing Provider   nirmatrelvir/ritonavir 300/100 (PAXLOVID, 300/100,) 20 x 150 MG & 10 x 100MG TBPK Take 3 tablets (two 150 mg nirmatrelvir and one 100 mg ritonavir tablets) by mouth every 12 hours for 5 days.  Yes GABY Carr - NP   promethazine-dextromethorphan (PROMETHAZINE-DM) 6.25-15 MG/5ML syrup Take 5 mLs by mouth 4 times daily as needed for Cough Yes Silas Sanchez APRN - NP   valsartan (DIOVAN) 40 MG tablet TAKE 1 TABLET BY MOUTH DAILY AT  BEDTIME  Silas Sanchez APRN - NP   atorvastatin (LIPITOR) 40 MG tablet TAKE 1

## 2023-11-08 ENCOUNTER — TELEPHONE (OUTPATIENT)
Age: 83
End: 2023-11-08

## 2023-11-08 NOTE — TELEPHONE ENCOUNTER
Pt tested pos for covid and was prescribed promethazine-dextromethorphan (PROMETHAZINE-DM) 6.25-15 MG/5ML syrup and nirmatrelvir/ritonavir 300/100 (PAXLOVID, 300/100,) 20 x 150 MG & 10 x 100MG TBPK, states she is now having diarrhea and would like to know if she can stop taking them. Please advise.

## 2023-11-08 NOTE — TELEPHONE ENCOUNTER
Patient reports her COVID symptoms are improving but wants to know if she can stop her Phalloid believes tis is causig her diarrhea.  Reports no fever ,please advise

## 2023-11-29 RX ORDER — METOPROLOL SUCCINATE 50 MG/1
50 TABLET, EXTENDED RELEASE ORAL DAILY
Qty: 90 TABLET | Refills: 3 | Status: SHIPPED | OUTPATIENT
Start: 2023-11-29

## 2024-01-04 ENCOUNTER — NURSE TRIAGE (OUTPATIENT)
Dept: OTHER | Facility: CLINIC | Age: 84
End: 2024-01-04

## 2024-01-04 NOTE — TELEPHONE ENCOUNTER
[facility]    Received call from Joseph at [facility] with Red Flag Complaint.    Subjective: Caller states \" I tripped and fell last Wednesday night- I feel like my BP has been high since then \"   Denies hitting head or further issues  Current Symptoms: Bruise on leg from fall- no other injuries  /67 HR 63  /70  193/96 while on the phone with triager- pt has no cardiac symtpoms      Onset: 1 week ago; gradual    Associated Symptoms: NA    Pain Severity: 0/10; N/A; none    Temperature: no fevers     What has been tried: none      Recommended disposition: Go to Office Now    Care advice provided, patient verbalizes understanding; denies any other questions or concerns; instructed to call back for any new or worsening symptoms.    Writer provided warm transfer to adan [facility] for second level triage    Attention Provider:  Thank you for allowing me to participate in the care of your patient.  The patient was connected to triage in response to information provided to the [facility].  Please do not respond through this encounter as the response is not directed to a shared pool.        Reason for Disposition   Systolic BP >= 180 OR Diastolic >= 110    Protocols used: Blood Pressure - High-ADULT-[place]

## 2024-01-05 ENCOUNTER — OFFICE VISIT (OUTPATIENT)
Age: 84
End: 2024-01-05
Payer: MEDICARE

## 2024-01-05 VITALS
WEIGHT: 172.8 LBS | BODY MASS INDEX: 29.5 KG/M2 | DIASTOLIC BLOOD PRESSURE: 80 MMHG | RESPIRATION RATE: 17 BRPM | HEIGHT: 64 IN | TEMPERATURE: 98.3 F | HEART RATE: 62 BPM | OXYGEN SATURATION: 97 % | SYSTOLIC BLOOD PRESSURE: 210 MMHG

## 2024-01-05 DIAGNOSIS — N18.31 STAGE 3A CHRONIC KIDNEY DISEASE (HCC): ICD-10-CM

## 2024-01-05 DIAGNOSIS — I10 PRIMARY HYPERTENSION: Primary | ICD-10-CM

## 2024-01-05 PROCEDURE — 1036F TOBACCO NON-USER: CPT | Performed by: NURSE PRACTITIONER

## 2024-01-05 PROCEDURE — 3079F DIAST BP 80-89 MM HG: CPT | Performed by: NURSE PRACTITIONER

## 2024-01-05 PROCEDURE — G8427 DOCREV CUR MEDS BY ELIG CLIN: HCPCS | Performed by: NURSE PRACTITIONER

## 2024-01-05 PROCEDURE — 3077F SYST BP >= 140 MM HG: CPT | Performed by: NURSE PRACTITIONER

## 2024-01-05 PROCEDURE — 99214 OFFICE O/P EST MOD 30 MIN: CPT | Performed by: NURSE PRACTITIONER

## 2024-01-05 PROCEDURE — 1090F PRES/ABSN URINE INCON ASSESS: CPT | Performed by: NURSE PRACTITIONER

## 2024-01-05 PROCEDURE — G8484 FLU IMMUNIZE NO ADMIN: HCPCS | Performed by: NURSE PRACTITIONER

## 2024-01-05 PROCEDURE — G8399 PT W/DXA RESULTS DOCUMENT: HCPCS | Performed by: NURSE PRACTITIONER

## 2024-01-05 PROCEDURE — 1123F ACP DISCUSS/DSCN MKR DOCD: CPT | Performed by: NURSE PRACTITIONER

## 2024-01-05 PROCEDURE — G8417 CALC BMI ABV UP PARAM F/U: HCPCS | Performed by: NURSE PRACTITIONER

## 2024-01-05 SDOH — ECONOMIC STABILITY: INCOME INSECURITY: IN THE LAST 12 MONTHS, WAS THERE A TIME WHEN YOU WERE NOT ABLE TO PAY THE MORTGAGE OR RENT ON TIME?: NO

## 2024-01-05 ASSESSMENT — PATIENT HEALTH QUESTIONNAIRE - PHQ9
SUM OF ALL RESPONSES TO PHQ QUESTIONS 1-9: 0
SUM OF ALL RESPONSES TO PHQ9 QUESTIONS 1 & 2: 0
SUM OF ALL RESPONSES TO PHQ QUESTIONS 1-9: 0
2. FEELING DOWN, DEPRESSED OR HOPELESS: 0
1. LITTLE INTEREST OR PLEASURE IN DOING THINGS: 0
SUM OF ALL RESPONSES TO PHQ QUESTIONS 1-9: 0
SUM OF ALL RESPONSES TO PHQ QUESTIONS 1-9: 0

## 2024-01-05 NOTE — PATIENT INSTRUCTIONS
Go home and take 2 valsartan pills right now. Check your blood pressure again in 1 hour after the medicine. If your top number is still higher than 180, take an extra metoprolol. Plan to take your evening dose of valsartan as scheduled but make it a double dose instead of one tonight.     I want you to be on 80 mg of valsartan (two pills) at bedtime and one 50 mg metoprolol in the morning.     If your blood pressure drops, you need to chug water or Gatorade. You need to eat something salty like potato chips or pretzels. You need to sit in the chair with your feet up and a phone nearby in case you get light headed. Once the medicine is in your system, it's in your system. I can't take it out so we just have to outlast the symptoms.    If you develop chest pain, vision changes, headache, dizziness, or feel like you're going to pass out, I want you to call 911.

## 2024-01-05 NOTE — PROGRESS NOTES
\"Have you been to the ER, urgent care clinic since your last visit?  Hospitalized since your last visit?\"    YES [facility]    “Have you seen or consulted any other health care providers outside of [facility] since your last visit?”    YES [facility]         
220/80 (!) 210/80   Site: Right Upper Arm Left Upper Arm   Position: Sitting Sitting   Cuff Size: Medium Adult Medium Adult   Pulse: 62    Resp: 17    Temp: 98.3 °F (36.8 °C)    TempSrc: Oral    SpO2: 97%    Weight: 78.4 kg (172 lb 12.8 oz)    Height: 1.626 m (5' 4\")       Body mass index is 29.66 kg/m².     Wt Readings from Last 3 Encounters:   01/05/24 78.4 kg (172 lb 12.8 oz)   11/06/23 77.9 kg (171 lb 12.8 oz)   11/02/23 78.2 kg (172 lb 7.2 oz)       Constitutional: WDWN Female in no acute distress  HENT:  NC/AT, TMs pearly gray, OP: clear  EYES: EOMI, PERRL  Neck:  Supple, no JVD, mass or bruit. No thyromegaly.  Respiratory:  Respirations even and unlabored without use of accessory muscles, CTA throughout without wheezes, rales, rubs or rhonchi. Symmetrical chest expansion.  Cardiac: RRR  Skin: intact and warm to the touch, no rash   Lymphadenopathy: no cervical or supraclavicular nodes  Psych: Pleasant and appropriate. Judgment normal. Alert and oriented x 3.    Medication Side Effects and Warnings were discussed with patient:  yes  Patient Labs were reviewed:  yes  Patient Past Records were reviewed:  yes    GABY Atkinson NP

## 2024-01-09 ENCOUNTER — OFFICE VISIT (OUTPATIENT)
Age: 84
End: 2024-01-09
Payer: MEDICARE

## 2024-01-09 VITALS
TEMPERATURE: 98 F | DIASTOLIC BLOOD PRESSURE: 80 MMHG | RESPIRATION RATE: 18 BRPM | HEART RATE: 65 BPM | BODY MASS INDEX: 29.53 KG/M2 | SYSTOLIC BLOOD PRESSURE: 162 MMHG | WEIGHT: 173 LBS | OXYGEN SATURATION: 98 % | HEIGHT: 64 IN

## 2024-01-09 DIAGNOSIS — F41.8 ANXIETY ABOUT HEALTH: ICD-10-CM

## 2024-01-09 DIAGNOSIS — I10 PRIMARY HYPERTENSION: Primary | ICD-10-CM

## 2024-01-09 PROCEDURE — 3079F DIAST BP 80-89 MM HG: CPT | Performed by: NURSE PRACTITIONER

## 2024-01-09 PROCEDURE — G8484 FLU IMMUNIZE NO ADMIN: HCPCS | Performed by: NURSE PRACTITIONER

## 2024-01-09 PROCEDURE — 1090F PRES/ABSN URINE INCON ASSESS: CPT | Performed by: NURSE PRACTITIONER

## 2024-01-09 PROCEDURE — 1123F ACP DISCUSS/DSCN MKR DOCD: CPT | Performed by: NURSE PRACTITIONER

## 2024-01-09 PROCEDURE — 3077F SYST BP >= 140 MM HG: CPT | Performed by: NURSE PRACTITIONER

## 2024-01-09 PROCEDURE — G8427 DOCREV CUR MEDS BY ELIG CLIN: HCPCS | Performed by: NURSE PRACTITIONER

## 2024-01-09 PROCEDURE — G8399 PT W/DXA RESULTS DOCUMENT: HCPCS | Performed by: NURSE PRACTITIONER

## 2024-01-09 PROCEDURE — 1036F TOBACCO NON-USER: CPT | Performed by: NURSE PRACTITIONER

## 2024-01-09 PROCEDURE — 99214 OFFICE O/P EST MOD 30 MIN: CPT | Performed by: NURSE PRACTITIONER

## 2024-01-09 PROCEDURE — G8417 CALC BMI ABV UP PARAM F/U: HCPCS | Performed by: NURSE PRACTITIONER

## 2024-01-09 RX ORDER — SERTRALINE HYDROCHLORIDE 25 MG/1
25 TABLET, FILM COATED ORAL DAILY
Qty: 30 TABLET | Refills: 0 | Status: SHIPPED | OUTPATIENT
Start: 2024-01-09

## 2024-01-09 SDOH — ECONOMIC STABILITY: INCOME INSECURITY: HOW HARD IS IT FOR YOU TO PAY FOR THE VERY BASICS LIKE FOOD, HOUSING, MEDICAL CARE, AND HEATING?: NOT HARD AT ALL

## 2024-01-09 SDOH — ECONOMIC STABILITY: FOOD INSECURITY: WITHIN THE PAST 12 MONTHS, THE FOOD YOU BOUGHT JUST DIDN'T LAST AND YOU DIDN'T HAVE MONEY TO GET MORE.: NEVER TRUE

## 2024-01-09 SDOH — ECONOMIC STABILITY: FOOD INSECURITY: WITHIN THE PAST 12 MONTHS, YOU WORRIED THAT YOUR FOOD WOULD RUN OUT BEFORE YOU GOT MONEY TO BUY MORE.: NEVER TRUE

## 2024-01-09 ASSESSMENT — ANXIETY QUESTIONNAIRES
1. FEELING NERVOUS, ANXIOUS, OR ON EDGE: 0
4. TROUBLE RELAXING: 0
7. FEELING AFRAID AS IF SOMETHING AWFUL MIGHT HAPPEN: 0
GAD7 TOTAL SCORE: 0
2. NOT BEING ABLE TO STOP OR CONTROL WORRYING: 0
IF YOU CHECKED OFF ANY PROBLEMS ON THIS QUESTIONNAIRE, HOW DIFFICULT HAVE THESE PROBLEMS MADE IT FOR YOU TO DO YOUR WORK, TAKE CARE OF THINGS AT HOME, OR GET ALONG WITH OTHER PEOPLE: NOT DIFFICULT AT ALL
3. WORRYING TOO MUCH ABOUT DIFFERENT THINGS: 0
6. BECOMING EASILY ANNOYED OR IRRITABLE: 0
5. BEING SO RESTLESS THAT IT IS HARD TO SIT STILL: 0

## 2024-01-09 ASSESSMENT — PATIENT HEALTH QUESTIONNAIRE - PHQ9
1. LITTLE INTEREST OR PLEASURE IN DOING THINGS: 0
SUM OF ALL RESPONSES TO PHQ9 QUESTIONS 1 & 2: 0
2. FEELING DOWN, DEPRESSED OR HOPELESS: 0
SUM OF ALL RESPONSES TO PHQ QUESTIONS 1-9: 0

## 2024-01-09 NOTE — PROGRESS NOTES
Provider, Greg, MD   dicyclomine (BENTYL) 10 MG capsule Take 1 capsule by mouth daily as needed (Upset stomach) Yes Roseline Michael APRN - NP   aspirin 81 MG EC tablet Take by mouth daily Yes Automatic Reconciliation, Ar   cetirizine (ZYRTEC) 10 MG tablet Take 1 tablet by mouth daily Yes Automatic Reconciliation, Ar   vitamin D 25 MCG (1000 UT) CAPS Take 2 capsules by mouth daily Yes Automatic Reconciliation, Ar   hydrOXYzine HCl (ATARAX) 25 MG tablet 1-2 tablets PO TID PRN anxiety Yes Automatic Reconciliation, Ar   pantoprazole (PROTONIX) 40 MG tablet Take 1 tablet by mouth daily Yes Automatic Reconciliation, Ar       Past Medical History:   Diagnosis Date    Edema     Hypercholesterolemia     Hypertension     Insomnia, unspecified     Osteopenia     Prediabetes     Unspecified vitamin D deficiency        Family History   Problem Relation Age of Onset    Hypertension Mother     Hypertension Father     Diabetes Brother     Diabetes Sister     Kidney Disease Father     Diabetes Mother          Review of Systems    A comprehensive review of systems was negative except for that written in the HPI.    Patient is not experiencing chest pain radiating to the jaw and/or down the arms.    Physical Examination:    Vitals:    01/09/24 1313   BP: (!) 162/80   Site: Left Upper Arm   Pulse: 65   Resp: 18   Temp: 98 °F (36.7 °C)   SpO2: 98%   Weight: 78.5 kg (173 lb)   Height: 1.626 m (5' 4\")      Body mass index is 29.7 kg/m².     Wt Readings from Last 3 Encounters:   01/09/24 78.5 kg (173 lb)   01/05/24 78.4 kg (172 lb 12.8 oz)   11/06/23 77.9 kg (171 lb 12.8 oz)       Constitutional: WDWN Female in no acute distress  HENT:  NC/AT, TMs pearly gray, OP: clear  EYES: EOMI, PERRL  Neck:  Supple, no JVD, mass or bruit. No thyromegaly.  Respiratory:  Respirations even and unlabored without use of accessory muscles, CTA throughout without wheezes, rales, rubs or rhonchi. Symmetrical chest expansion.  Cardiac: RRR  Skin:

## 2024-01-09 NOTE — PATIENT INSTRUCTIONS
Let's do this: Take 40 mg of valsartan at bedtime and in the morning. Move the metoprolol to mid-day.     I want you to check your pressure twice a day: once mid morning and once around supper time and your heart rate. Write this down and drop it off at my office in a few days/next week whenever you're at [employer].

## 2024-01-26 ENCOUNTER — TELEPHONE (OUTPATIENT)
Age: 84
End: 2024-01-26

## 2024-01-26 RX ORDER — VALSARTAN 80 MG/1
80 TABLET ORAL 2 TIMES DAILY
Qty: 180 TABLET | Refills: 3 | Status: SHIPPED | OUTPATIENT
Start: 2024-01-26 | End: 2024-01-31 | Stop reason: SDUPTHER

## 2024-01-26 NOTE — TELEPHONE ENCOUNTER
BP remains higher than it should be. Please call her. I am going to increase her valsartan from 40 mg twice a day to 80 mg twice a day. Keep metoprolol the same, and take it at the same time, mid-day. Keep checking BP twice a day and drop off a log in another 1-2 weeks.

## 2024-01-29 NOTE — TELEPHONE ENCOUNTER
PC SW pt informed of Hailey's advice, did question if valsartan still twice daily, according to new Rx it is so, but will confirm with Hailey. Per Hailey yes, correct and to leave the metoprolol as is.

## 2024-01-29 NOTE — TELEPHONE ENCOUNTER
PC to confirm medications instruction per pt's request.  She was informed of per Hailey, the only thing that is changing is the strength of the valsartan from 40 mg to 80 mg, do all medications at the same times she has been doing them, OK of understanding and thanks.

## 2024-01-31 RX ORDER — VALSARTAN 80 MG/1
80 TABLET ORAL 2 TIMES DAILY
Qty: 180 TABLET | Refills: 1 | Status: SHIPPED | OUTPATIENT
Start: 2024-01-31

## 2024-01-31 NOTE — TELEPHONE ENCOUNTER
Patient requesting refill on     Requested Prescriptions     Pending Prescriptions Disp Refills    valsartan (DIOVAN) 80 MG tablet 180 tablet 1     Sig: Take 1 tablet by mouth 2 times daily        Last OV 1/9/2024

## 2024-01-31 NOTE — TELEPHONE ENCOUNTER
A RX for valsartan was called into [facility] and Katerin is not able to afford it. [facility] can get her a 90 day supply for $20. Can a PCP do this today for her since Hailey isn't returning until Monday.    Medication Refill Request    Katerin Biswas is requesting a refill of the following medication(s):   valsartan (DIOVAN) 80 MG tablet     Please send refill to:      [facility] - [place], [place] - [address] -  [phone] - F [phone]  [address] [address]  Phone: [phone] Fax: [phone]

## 2024-02-05 ENCOUNTER — TELEPHONE (OUTPATIENT)
Age: 84
End: 2024-02-05

## 2024-02-05 NOTE — TELEPHONE ENCOUNTER
Patient c/o elevated blood pressure (today's reading 151/73) Questioned if she had dropped off log of blood pressure readings per NP Mark's request. Explained readings would be reviewed and medications adjusted as needed. Patient states she has not and would rather schedule an appointment to see NP. Appt scheduled 2/9/24.

## 2024-02-05 NOTE — TELEPHONE ENCOUNTER
I'm okay with that pressure. No med changes and I'll see her on the 9th. If she has chest pain, SOB, please call us or head to the ER.

## 2024-02-09 ENCOUNTER — OFFICE VISIT (OUTPATIENT)
Age: 84
End: 2024-02-09
Payer: MEDICARE

## 2024-02-09 VITALS
HEIGHT: 64 IN | RESPIRATION RATE: 17 BRPM | TEMPERATURE: 98.1 F | DIASTOLIC BLOOD PRESSURE: 70 MMHG | WEIGHT: 175.6 LBS | HEART RATE: 60 BPM | BODY MASS INDEX: 29.98 KG/M2 | OXYGEN SATURATION: 98 % | SYSTOLIC BLOOD PRESSURE: 180 MMHG

## 2024-02-09 DIAGNOSIS — I10 PRIMARY HYPERTENSION: Primary | ICD-10-CM

## 2024-02-09 PROCEDURE — 1123F ACP DISCUSS/DSCN MKR DOCD: CPT | Performed by: NURSE PRACTITIONER

## 2024-02-09 PROCEDURE — 3077F SYST BP >= 140 MM HG: CPT | Performed by: NURSE PRACTITIONER

## 2024-02-09 PROCEDURE — G8399 PT W/DXA RESULTS DOCUMENT: HCPCS | Performed by: NURSE PRACTITIONER

## 2024-02-09 PROCEDURE — G8417 CALC BMI ABV UP PARAM F/U: HCPCS | Performed by: NURSE PRACTITIONER

## 2024-02-09 PROCEDURE — 1090F PRES/ABSN URINE INCON ASSESS: CPT | Performed by: NURSE PRACTITIONER

## 2024-02-09 PROCEDURE — 3078F DIAST BP <80 MM HG: CPT | Performed by: NURSE PRACTITIONER

## 2024-02-09 PROCEDURE — 99214 OFFICE O/P EST MOD 30 MIN: CPT | Performed by: NURSE PRACTITIONER

## 2024-02-09 PROCEDURE — 1036F TOBACCO NON-USER: CPT | Performed by: NURSE PRACTITIONER

## 2024-02-09 PROCEDURE — G8427 DOCREV CUR MEDS BY ELIG CLIN: HCPCS | Performed by: NURSE PRACTITIONER

## 2024-02-09 PROCEDURE — G8484 FLU IMMUNIZE NO ADMIN: HCPCS | Performed by: NURSE PRACTITIONER

## 2024-02-09 RX ORDER — OLMESARTAN MEDOXOMIL AND HYDROCHLOROTHIAZIDE 40/25 40; 25 MG/1; MG/1
1 TABLET ORAL DAILY
Qty: 30 TABLET | Refills: 0 | Status: SHIPPED | OUTPATIENT
Start: 2024-02-09

## 2024-02-09 ASSESSMENT — PATIENT HEALTH QUESTIONNAIRE - PHQ9
SUM OF ALL RESPONSES TO PHQ QUESTIONS 1-9: 0
SUM OF ALL RESPONSES TO PHQ QUESTIONS 1-9: 0
SUM OF ALL RESPONSES TO PHQ9 QUESTIONS 1 & 2: 0
SUM OF ALL RESPONSES TO PHQ QUESTIONS 1-9: 0
1. LITTLE INTEREST OR PLEASURE IN DOING THINGS: 0
SUM OF ALL RESPONSES TO PHQ QUESTIONS 1-9: 0
2. FEELING DOWN, DEPRESSED OR HOPELESS: 0

## 2024-02-09 NOTE — PATIENT INSTRUCTIONS
Stop valsartan. Don't throw it away; we may go back to it.  Move your metoprolol to bedtime.  Start the new medicine in the morning.

## 2024-02-09 NOTE — PROGRESS NOTES
\"Have you been to the ER, urgent care clinic since your last visit?  Hospitalized since your last visit?\"    NO    “Have you seen or consulted any other health care providers outside of [facility] since your last visit?”    NO

## 2024-02-09 NOTE — PROGRESS NOTES
Chief Complaint   Patient presents with    Hypertension       ASSESSMENT AND PLAN:      Diagnosis Orders   1. Primary hypertension            Labile Bps at home. She is very worried about her valsartan. Provided her with the following instructions:    Stop valsartan. Don't throw it away; we may go back to it.  Move your metoprolol to bedtime.  Start the new medicine in the morning.     Patient aware of plan of care and verbalized understanding. Questions answered. RTC as scheduled, or sooner if needed.    HPI:     is a 83 y.o. female in today for an acute visit.     Anxiety/depression - Stable.     Hyponatremia - Stable, no longer on sodium supplements.     Hypertension - On metoprolol QAM, valsartan BID. Released from Dr. Samson's care.     Venous insufficiency - follows with vascular through [facility]. Leaky valves, stable.     New issues: She reports labile blood pressures at home, 120 to 170 systolic. She wonders if she is having some type of reaction to the valsartan or the metoprolol. She reports her heart was pounding the other night in bed. Denies chest pain, SOB.     Allergies   Allergen Reactions    Sulfa Antibiotics Nausea Only and Other (See Comments)     Blisters On  Lip     Ciprofloxacin Hives    Lisinopril Other (See Comments)     Felt bad       Prior to Visit Medications    Medication Sig Taking? Authorizing Provider   olmesartan-hydroCHLOROthiazide (BENICAR HCT) 40-25 MG per tablet Take 1 tablet by mouth daily Yes Rosalina Flores, APRN - NP   sertraline (ZOLOFT) 25 MG tablet Take 1 tablet by mouth daily  Rosalina Flores APRN - NP   metoprolol succinate (TOPROL XL) 50 MG extended release tablet TAKE 1 TABLET BY MOUTH DAILY  Roseline Michael APRN - NP   atorvastatin (LIPITOR) 40 MG tablet TAKE 1 TABLET BY MOUTH DAILY  Rosalina Flores APRN - NP   polyethylene glycol (GLYCOLAX) 17 GM/SCOOP powder Take 17 g by mouth daily  Provider, MD Greg   dicyclomine (BENTYL) 10 MG capsule

## 2024-02-12 ENCOUNTER — TELEPHONE (OUTPATIENT)
Age: 84
End: 2024-02-12

## 2024-02-12 NOTE — TELEPHONE ENCOUNTER
----- Message from Yefri Clay sent at 2/12/2024  3:07 PM EST -----  Subject: Message to Provider    QUESTIONS  Information for Provider? last night after taking toperal and heart   started quivering. this morning she felt bad and took her blood pressure   it was 108/47. she states that she did not take her am meds until after   2pm 128/53   ---------------------------------------------------------------------------  --------------  CALL BACK INFO  [phone]; OK to leave message on voicemail  ---------------------------------------------------------------------------  --------------  SCRIPT ANSWERS  Relationship to Patient? Self

## 2024-02-12 NOTE — TELEPHONE ENCOUNTER
Please call patient. Hold metoprolol for tonight. Do not take it. Call back in the morning and let us know how she feels. If any chest pain, SOB, go to the ER.

## 2024-02-13 ENCOUNTER — TELEPHONE (OUTPATIENT)
Age: 84
End: 2024-02-13

## 2024-02-13 NOTE — TELEPHONE ENCOUNTER
Her pulse and blood pressures look awesome. Just to clarify, she should only be taking the olmesartan-hydrochlorothiazide in the morning, no other blood pressure medications. Is that correct? Let's put her on the schedule to come back this Friday instead of next Friday.

## 2024-02-13 NOTE — TELEPHONE ENCOUNTER
Pt states she was told to call and report in how she did on the medication . States her heart was pounding last night. This morning BP was 119/68  HR 87 later was 127/44 HR 77

## 2024-02-14 ENCOUNTER — APPOINTMENT (OUTPATIENT)
Facility: HOSPITAL | Age: 84
End: 2024-02-14
Payer: MEDICARE

## 2024-02-14 ENCOUNTER — HOSPITAL ENCOUNTER (EMERGENCY)
Facility: HOSPITAL | Age: 84
Discharge: ANOTHER HOSPITAL | End: 2024-02-15
Attending: EMERGENCY MEDICINE
Payer: MEDICARE

## 2024-02-14 DIAGNOSIS — I10 HYPERTENSION, UNSPECIFIED TYPE: Primary | ICD-10-CM

## 2024-02-14 DIAGNOSIS — I95.1 ORTHOSTASIS: ICD-10-CM

## 2024-02-14 DIAGNOSIS — E87.1 HYPONATREMIA: ICD-10-CM

## 2024-02-14 LAB
ANION GAP SERPL CALC-SCNC: 11 MMOL/L (ref 5–15)
APPEARANCE UR: CLEAR
BACTERIA URNS QL MICRO: NEGATIVE /HPF
BASOPHILS # BLD: 0 K/UL (ref 0–0.1)
BASOPHILS NFR BLD: 0 % (ref 0–1)
BILIRUB UR QL: NEGATIVE
BUN SERPL-MCNC: 22 MG/DL (ref 6–20)
BUN/CREAT SERPL: 17 (ref 12–20)
CALCIUM SERPL-MCNC: 9.7 MG/DL (ref 8.5–10.1)
CHLORIDE SERPL-SCNC: 87 MMOL/L (ref 97–108)
CO2 SERPL-SCNC: 25 MMOL/L (ref 21–32)
COLOR UR: ABNORMAL
CREAT SERPL-MCNC: 1.33 MG/DL (ref 0.55–1.02)
DIFFERENTIAL METHOD BLD: NORMAL
EOSINOPHIL # BLD: 0.1 K/UL (ref 0–0.4)
EOSINOPHIL NFR BLD: 2 % (ref 0–7)
EPITH CASTS URNS QL MICRO: ABNORMAL /LPF
ERYTHROCYTE [DISTWIDTH] IN BLOOD BY AUTOMATED COUNT: 12.4 % (ref 11.5–14.5)
GLUCOSE SERPL-MCNC: 107 MG/DL (ref 65–100)
GLUCOSE UR STRIP.AUTO-MCNC: NEGATIVE MG/DL
HCT VFR BLD AUTO: 35.9 % (ref 35–47)
HGB BLD-MCNC: 12.6 G/DL (ref 11.5–16)
HGB UR QL STRIP: NEGATIVE
IMM GRANULOCYTES # BLD AUTO: 0 K/UL (ref 0–0.04)
IMM GRANULOCYTES NFR BLD AUTO: 0 % (ref 0–0.5)
KETONES UR QL STRIP.AUTO: NEGATIVE MG/DL
LEUKOCYTE ESTERASE UR QL STRIP.AUTO: ABNORMAL
LYMPHOCYTES # BLD: 1.7 K/UL (ref 0.8–3.5)
LYMPHOCYTES NFR BLD: 23 % (ref 12–49)
MCH RBC QN AUTO: 29.6 PG (ref 26–34)
MCHC RBC AUTO-ENTMCNC: 35.1 G/DL (ref 30–36.5)
MCV RBC AUTO: 84.5 FL (ref 80–99)
MONOCYTES # BLD: 0.6 K/UL (ref 0–1)
MONOCYTES NFR BLD: 8 % (ref 5–13)
NEUTS SEG # BLD: 4.9 K/UL (ref 1.8–8)
NEUTS SEG NFR BLD: 67 % (ref 32–75)
NITRITE UR QL STRIP.AUTO: NEGATIVE
NRBC # BLD: 0 K/UL (ref 0–0.01)
NRBC BLD-RTO: 0 PER 100 WBC
PH UR STRIP: 6 (ref 5–8)
PLATELET # BLD AUTO: 239 K/UL (ref 150–400)
PMV BLD AUTO: 9.2 FL (ref 8.9–12.9)
POTASSIUM SERPL-SCNC: 4.4 MMOL/L (ref 3.5–5.1)
PROT UR STRIP-MCNC: NEGATIVE MG/DL
RBC # BLD AUTO: 4.25 M/UL (ref 3.8–5.2)
RBC #/AREA URNS HPF: ABNORMAL /HPF (ref 0–5)
SODIUM SERPL-SCNC: 123 MMOL/L (ref 136–145)
SP GR UR REFRACTOMETRY: 1.01 (ref 1–1.03)
TROPONIN I SERPL HS-MCNC: 14 NG/L (ref 0–51)
TROPONIN I SERPL HS-MCNC: 15 NG/L (ref 0–51)
URINE CULTURE IF INDICATED: ABNORMAL
UROBILINOGEN UR QL STRIP.AUTO: 0.2 EU/DL (ref 0.2–1)
WBC # BLD AUTO: 7.3 K/UL (ref 3.6–11)
WBC URNS QL MICRO: ABNORMAL /HPF (ref 0–4)

## 2024-02-14 PROCEDURE — 99285 EMERGENCY DEPT VISIT HI MDM: CPT

## 2024-02-14 PROCEDURE — 36415 COLL VENOUS BLD VENIPUNCTURE: CPT

## 2024-02-14 PROCEDURE — 2580000003 HC RX 258: Performed by: FAMILY MEDICINE

## 2024-02-14 PROCEDURE — 6370000000 HC RX 637 (ALT 250 FOR IP): Performed by: EMERGENCY MEDICINE

## 2024-02-14 PROCEDURE — 71045 X-RAY EXAM CHEST 1 VIEW: CPT

## 2024-02-14 PROCEDURE — 93005 ELECTROCARDIOGRAM TRACING: CPT | Performed by: FAMILY MEDICINE

## 2024-02-14 PROCEDURE — 85025 COMPLETE CBC W/AUTO DIFF WBC: CPT

## 2024-02-14 PROCEDURE — 6360000002 HC RX W HCPCS: Performed by: FAMILY MEDICINE

## 2024-02-14 PROCEDURE — 93005 ELECTROCARDIOGRAM TRACING: CPT | Performed by: EMERGENCY MEDICINE

## 2024-02-14 PROCEDURE — 96361 HYDRATE IV INFUSION ADD-ON: CPT

## 2024-02-14 PROCEDURE — 83935 ASSAY OF URINE OSMOLALITY: CPT

## 2024-02-14 PROCEDURE — 96365 THER/PROPH/DIAG IV INF INIT: CPT

## 2024-02-14 PROCEDURE — 81001 URINALYSIS AUTO W/SCOPE: CPT

## 2024-02-14 PROCEDURE — 84484 ASSAY OF TROPONIN QUANT: CPT

## 2024-02-14 PROCEDURE — 80048 BASIC METABOLIC PNL TOTAL CA: CPT

## 2024-02-14 PROCEDURE — 96375 TX/PRO/DX INJ NEW DRUG ADDON: CPT

## 2024-02-14 RX ORDER — ONDANSETRON 2 MG/ML
4 INJECTION INTRAMUSCULAR; INTRAVENOUS ONCE
Status: DISCONTINUED | OUTPATIENT
Start: 2024-02-14 | End: 2024-02-15 | Stop reason: HOSPADM

## 2024-02-14 RX ORDER — 0.9 % SODIUM CHLORIDE 0.9 %
500 INTRAVENOUS SOLUTION INTRAVENOUS ONCE
Status: DISCONTINUED | OUTPATIENT
Start: 2024-02-14 | End: 2024-02-15 | Stop reason: HOSPADM

## 2024-02-14 RX ORDER — HYDRALAZINE HYDROCHLORIDE 25 MG/1
50 TABLET, FILM COATED ORAL
Status: DISCONTINUED | OUTPATIENT
Start: 2024-02-14 | End: 2024-02-14

## 2024-02-14 RX ORDER — LABETALOL HYDROCHLORIDE 5 MG/ML
10 INJECTION, SOLUTION INTRAVENOUS ONCE
Status: DISCONTINUED | OUTPATIENT
Start: 2024-02-14 | End: 2024-02-15 | Stop reason: HOSPADM

## 2024-02-14 RX ORDER — SODIUM CHLORIDE 1 G/1
1 TABLET ORAL 3 TIMES DAILY
Qty: 21 TABLET | Refills: 0 | Status: ON HOLD | OUTPATIENT
Start: 2024-02-14 | End: 2024-02-18 | Stop reason: HOSPADM

## 2024-02-14 RX ORDER — HYDRALAZINE HYDROCHLORIDE 25 MG/1
25 TABLET, FILM COATED ORAL
Status: COMPLETED | OUTPATIENT
Start: 2024-02-14 | End: 2024-02-14

## 2024-02-14 RX ORDER — 0.9 % SODIUM CHLORIDE 0.9 %
500 INTRAVENOUS SOLUTION INTRAVENOUS ONCE
Status: COMPLETED | OUTPATIENT
Start: 2024-02-14 | End: 2024-02-14

## 2024-02-14 RX ORDER — HYDRALAZINE HYDROCHLORIDE 50 MG/1
50 TABLET, FILM COATED ORAL 3 TIMES DAILY
Qty: 90 TABLET | Refills: 3 | Status: ON HOLD | OUTPATIENT
Start: 2024-02-14 | End: 2024-02-18 | Stop reason: HOSPADM

## 2024-02-14 RX ORDER — ONDANSETRON 2 MG/ML
4 INJECTION INTRAMUSCULAR; INTRAVENOUS ONCE
Status: COMPLETED | OUTPATIENT
Start: 2024-02-14 | End: 2024-02-14

## 2024-02-14 RX ORDER — AMLODIPINE BESYLATE 5 MG/1
5 TABLET ORAL DAILY
Qty: 90 TABLET | Refills: 1 | Status: ON HOLD | OUTPATIENT
Start: 2024-02-14 | End: 2024-02-18 | Stop reason: HOSPADM

## 2024-02-14 RX ORDER — AMLODIPINE BESYLATE 5 MG/1
10 TABLET ORAL
Status: DISCONTINUED | OUTPATIENT
Start: 2024-02-14 | End: 2024-02-14

## 2024-02-14 RX ADMIN — ONDANSETRON 4 MG: 2 INJECTION INTRAMUSCULAR; INTRAVENOUS at 22:06

## 2024-02-14 RX ADMIN — HYDRALAZINE HYDROCHLORIDE 25 MG: 25 TABLET, FILM COATED ORAL at 15:27

## 2024-02-14 RX ADMIN — SODIUM CHLORIDE 500 ML: 9 INJECTION, SOLUTION INTRAVENOUS at 22:36

## 2024-02-14 RX ADMIN — SODIUM CHLORIDE 5 MG/HR: 9 INJECTION, SOLUTION INTRAVENOUS at 21:01

## 2024-02-14 RX ADMIN — HYDRALAZINE HYDROCHLORIDE 25 MG: 25 TABLET, FILM COATED ORAL at 16:22

## 2024-02-14 NOTE — TELEPHONE ENCOUNTER
SW pt, stated on Monday, BP was 108/47, HR 66.  This morning when she woke up, /45 felt bad, took new medication Benicart HCT to help feel better, later /53 HR 83., but also thinks it's the problem.  Also is no longer taking  the metoprolol anymore, so  seems like her heart just keeps beating fast wondering if that is the problem as well.   I informed again of going to the ER per Hailey if still having the cardiac symptoms, but will update Hailey.  She wanted Hailey to confirm if medications or not first, seems to be more at night time and when she first wakes up.

## 2024-02-14 NOTE — ED NOTES
Provided extensive education to pt in regards to orthostatic hypotension. Explained to change positions slowly. Also instructed pt to Follow up with PCP on Friday.   Instructions provided to patient in regards to discharge. Patient verbalized understanding. No questions at time of discharge.  When pt was getting dressed for DC she became dizzy, diaphoretic, and had to lie back down. This RN to bedside. MD aware.

## 2024-02-14 NOTE — TELEPHONE ENCOUNTER
Please call and check on the patient. She called in yesterday with good looking blood pressure and pulse rate but told Saskia she felt like her heart was quivering and racing. Saskia advised her to go to the ER. I've haven't seen a report about her. If she continues to have cardiac symptoms, I recommend she head to the ER.

## 2024-02-14 NOTE — ED NOTES
Office visit chart for 02/09/24 accessed due to pt was not sure what medication she was placed on for her blood pressure after she was taken off the Metoprolol

## 2024-02-14 NOTE — ED TRIAGE NOTES
Pt arrived by POV for initially low blood pressure.  Pt found to be hypertensive at triage.  Pt reports for 6 weeks her PMD has been switching around her blood pressure medications and last week her PMD stopped her metoprolol and placed her on Valsartan.  Pt is awake alert and oriented X 4,  pt educated on ER flow

## 2024-02-14 NOTE — DISCHARGE INSTRUCTIONS
We are discontinuing your olmesartan-hydrochlorothiazide will start hydralazine and amlodipine.  Plan is to have your repeat sodium checked Friday morning before your appointment with your primary care provider.

## 2024-02-14 NOTE — TELEPHONE ENCOUNTER
PC to  # VM left to rt the call.  SW pt on mobile #, informed of Hailey's advice, but was on her way to the ER, heart felt like it was just pounding to hard.  Since she was not feeling good, she did re take her BP, top little over 100, bottom 47.  She also confirmed that since not taking her metoprolol in two nights and with taking the new medication believes that's what's causing the problem.  Message to Hailey for FYI.

## 2024-02-14 NOTE — TELEPHONE ENCOUNTER
Please clarify. She called on Monday because she thought the metoprolol was making her heart quiver. We told her to stop it. Now she thinks the new medicine is the problem but her blood pressure and pulse are excellent. Symptoms are worse at night and she doses her new medicine in the morning. I am not convinced this is the culprit. If she is having palpitations, chest pain, or shortness of breath, I recommend she either get checked out by one of the providers who are in the office today or head to the ER. I don't recommend she wait to see me on Friday when I'm back.     If she is more comfortable going back to her valsartan 80 mg twice a day and metoprolol 50 mg once a day, which was her old regimen that she wanted to change, I am comfortable with that.

## 2024-02-15 ENCOUNTER — HOSPITAL ENCOUNTER (INPATIENT)
Facility: HOSPITAL | Age: 84
LOS: 3 days | Discharge: HOME | DRG: 645 | End: 2024-02-18
Attending: STUDENT IN AN ORGANIZED HEALTH CARE EDUCATION/TRAINING PROGRAM | Admitting: STUDENT IN AN ORGANIZED HEALTH CARE EDUCATION/TRAINING PROGRAM
Payer: MEDICARE

## 2024-02-15 ENCOUNTER — APPOINTMENT (OUTPATIENT)
Facility: HOSPITAL | Age: 84
DRG: 645 | End: 2024-02-15
Attending: STUDENT IN AN ORGANIZED HEALTH CARE EDUCATION/TRAINING PROGRAM
Payer: MEDICARE

## 2024-02-15 VITALS
OXYGEN SATURATION: 94 % | WEIGHT: 172 LBS | DIASTOLIC BLOOD PRESSURE: 65 MMHG | HEIGHT: 64 IN | TEMPERATURE: 98.8 F | HEART RATE: 79 BPM | SYSTOLIC BLOOD PRESSURE: 172 MMHG | BODY MASS INDEX: 29.37 KG/M2 | RESPIRATION RATE: 17 BRPM

## 2024-02-15 DIAGNOSIS — I10 PRIMARY HYPERTENSION: Primary | ICD-10-CM

## 2024-02-15 LAB
ANION GAP SERPL CALC-SCNC: 10 MMOL/L (ref 5–15)
APPEARANCE UR: CLEAR
BACTERIA URNS QL MICRO: NEGATIVE /HPF
BASOPHILS # BLD: 0 K/UL (ref 0–0.1)
BASOPHILS NFR BLD: 1 % (ref 0–1)
BILIRUB UR QL: NEGATIVE
BUN SERPL-MCNC: 20 MG/DL (ref 6–20)
BUN/CREAT SERPL: 16 (ref 12–20)
CALCIUM SERPL-MCNC: 9.4 MG/DL (ref 8.5–10.1)
CHLORIDE SERPL-SCNC: 92 MMOL/L (ref 97–108)
CHLORIDE UR-SCNC: 124 MMOL/L
CO2 SERPL-SCNC: 24 MMOL/L (ref 21–32)
COLOR UR: ABNORMAL
CORTIS AM PEAK SERPL-MCNC: 15.5 UG/DL (ref 4.3–22.45)
CREAT SERPL-MCNC: 1.25 MG/DL (ref 0.55–1.02)
CREAT UR-MCNC: 92.5 MG/DL
DIFFERENTIAL METHOD BLD: ABNORMAL
ECHO AO ROOT DIAM: 3.2 CM
ECHO AO ROOT INDEX: 1.75 CM/M2
ECHO AV AREA PEAK VELOCITY: 2.2 CM2
ECHO AV AREA/BSA PEAK VELOCITY: 1.2 CM2/M2
ECHO AV PEAK GRADIENT: 10 MMHG
ECHO AV PEAK VELOCITY: 1.6 M/S
ECHO AV VELOCITY RATIO: 0.69
ECHO BSA: 1.87 M2
ECHO LA DIAMETER INDEX: 1.86 CM/M2
ECHO LA DIAMETER: 3.4 CM
ECHO LA TO AORTIC ROOT RATIO: 1.06
ECHO LV FRACTIONAL SHORTENING: 32 % (ref 28–44)
ECHO LV INTERNAL DIMENSION DIASTOLE INDEX: 2.08 CM/M2
ECHO LV INTERNAL DIMENSION DIASTOLIC: 3.8 CM (ref 3.9–5.3)
ECHO LV INTERNAL DIMENSION SYSTOLIC INDEX: 1.42 CM/M2
ECHO LV INTERNAL DIMENSION SYSTOLIC: 2.6 CM
ECHO LV IVSD: 1.5 CM (ref 0.6–0.9)
ECHO LV MASS 2D: 216.6 G (ref 67–162)
ECHO LV MASS INDEX 2D: 118.3 G/M2 (ref 43–95)
ECHO LV POSTERIOR WALL DIASTOLIC: 1.5 CM (ref 0.6–0.9)
ECHO LV RELATIVE WALL THICKNESS RATIO: 0.79
ECHO LVOT AREA: 3.1 CM2
ECHO LVOT DIAM: 2 CM
ECHO LVOT PEAK GRADIENT: 5 MMHG
ECHO LVOT PEAK VELOCITY: 1.1 M/S
EKG ATRIAL RATE: 66 BPM
EKG ATRIAL RATE: 73 BPM
EKG DIAGNOSIS: NORMAL
EKG DIAGNOSIS: NORMAL
EKG P AXIS: 70 DEGREES
EKG P-R INTERVAL: 166 MS
EKG P-R INTERVAL: 172 MS
EKG Q-T INTERVAL: 410 MS
EKG Q-T INTERVAL: 444 MS
EKG QRS DURATION: 88 MS
EKG QRS DURATION: 96 MS
EKG QTC CALCULATION (BAZETT): 451 MS
EKG QTC CALCULATION (BAZETT): 465 MS
EKG R AXIS: 17 DEGREES
EKG R AXIS: 38 DEGREES
EKG T AXIS: -27 DEGREES
EKG T AXIS: 89 DEGREES
EKG VENTRICULAR RATE: 66 BPM
EKG VENTRICULAR RATE: 73 BPM
EOSINOPHIL # BLD: 0.1 K/UL (ref 0–0.4)
EOSINOPHIL NFR BLD: 1 % (ref 0–7)
EPITH CASTS URNS QL MICRO: ABNORMAL /LPF
ERYTHROCYTE [DISTWIDTH] IN BLOOD BY AUTOMATED COUNT: 12.6 % (ref 11.5–14.5)
GLUCOSE SERPL-MCNC: 123 MG/DL (ref 65–100)
GLUCOSE UR STRIP.AUTO-MCNC: NEGATIVE MG/DL
HCT VFR BLD AUTO: 34.2 % (ref 35–47)
HGB BLD-MCNC: 11.6 G/DL (ref 11.5–16)
HGB UR QL STRIP: NEGATIVE
HYALINE CASTS URNS QL MICRO: ABNORMAL /LPF (ref 0–2)
IMM GRANULOCYTES # BLD AUTO: 0 K/UL (ref 0–0.04)
IMM GRANULOCYTES NFR BLD AUTO: 0 % (ref 0–0.5)
KETONES UR QL STRIP.AUTO: NEGATIVE MG/DL
LEUKOCYTE ESTERASE UR QL STRIP.AUTO: ABNORMAL
LYMPHOCYTES # BLD: 1.4 K/UL (ref 0.8–3.5)
LYMPHOCYTES NFR BLD: 20 % (ref 12–49)
MAGNESIUM SERPL-MCNC: 1.9 MG/DL (ref 1.6–2.4)
MCH RBC QN AUTO: 29.6 PG (ref 26–34)
MCHC RBC AUTO-ENTMCNC: 33.9 G/DL (ref 30–36.5)
MCV RBC AUTO: 87.2 FL (ref 80–99)
MONOCYTES # BLD: 0.7 K/UL (ref 0–1)
MONOCYTES NFR BLD: 10 % (ref 5–13)
NEUTS SEG # BLD: 4.7 K/UL (ref 1.8–8)
NEUTS SEG NFR BLD: 68 % (ref 32–75)
NITRITE UR QL STRIP.AUTO: NEGATIVE
NRBC # BLD: 0 K/UL (ref 0–0.01)
NRBC BLD-RTO: 0 PER 100 WBC
OSMOLALITY SERPL: 266 MOSM/KG H2O
OSMOLALITY UR: 436 MOSM/KG H2O
OSMOLALITY UR: 537 MOSM/KG H2O
PH UR STRIP: 6 (ref 5–8)
PLATELET # BLD AUTO: 235 K/UL (ref 150–400)
PMV BLD AUTO: 9.5 FL (ref 8.9–12.9)
POTASSIUM SERPL-SCNC: 4.2 MMOL/L (ref 3.5–5.1)
PROT UR STRIP-MCNC: 30 MG/DL
RBC # BLD AUTO: 3.92 M/UL (ref 3.8–5.2)
RBC #/AREA URNS HPF: ABNORMAL /HPF (ref 0–5)
SODIUM SERPL-SCNC: 126 MMOL/L (ref 136–145)
SODIUM UR-SCNC: 106 MMOL/L
SP GR UR REFRACTOMETRY: 1.01
TSH SERPL DL<=0.05 MIU/L-ACNC: 1.72 UIU/ML (ref 0.36–3.74)
URINE CULTURE IF INDICATED: ABNORMAL
UROBILINOGEN UR QL STRIP.AUTO: 0.2 EU/DL (ref 0.2–1)
WBC # BLD AUTO: 6.8 K/UL (ref 3.6–11)
WBC URNS QL MICRO: ABNORMAL /HPF (ref 0–4)

## 2024-02-15 PROCEDURE — 36415 COLL VENOUS BLD VENIPUNCTURE: CPT

## 2024-02-15 PROCEDURE — 97116 GAIT TRAINING THERAPY: CPT

## 2024-02-15 PROCEDURE — 6360000002 HC RX W HCPCS: Performed by: STUDENT IN AN ORGANIZED HEALTH CARE EDUCATION/TRAINING PROGRAM

## 2024-02-15 PROCEDURE — 97165 OT EVAL LOW COMPLEX 30 MIN: CPT

## 2024-02-15 PROCEDURE — 93308 TTE F-UP OR LMTD: CPT

## 2024-02-15 PROCEDURE — 87077 CULTURE AEROBIC IDENTIFY: CPT

## 2024-02-15 PROCEDURE — 97161 PT EVAL LOW COMPLEX 20 MIN: CPT

## 2024-02-15 PROCEDURE — 81001 URINALYSIS AUTO W/SCOPE: CPT

## 2024-02-15 PROCEDURE — 80048 BASIC METABOLIC PNL TOTAL CA: CPT

## 2024-02-15 PROCEDURE — 82436 ASSAY OF URINE CHLORIDE: CPT

## 2024-02-15 PROCEDURE — 2580000003 HC RX 258: Performed by: STUDENT IN AN ORGANIZED HEALTH CARE EDUCATION/TRAINING PROGRAM

## 2024-02-15 PROCEDURE — 84300 ASSAY OF URINE SODIUM: CPT

## 2024-02-15 PROCEDURE — 83735 ASSAY OF MAGNESIUM: CPT

## 2024-02-15 PROCEDURE — 87186 SC STD MICRODIL/AGAR DIL: CPT

## 2024-02-15 PROCEDURE — 97535 SELF CARE MNGMENT TRAINING: CPT

## 2024-02-15 PROCEDURE — 82533 TOTAL CORTISOL: CPT

## 2024-02-15 PROCEDURE — 1100000003 HC PRIVATE W/ TELEMETRY

## 2024-02-15 PROCEDURE — 87086 URINE CULTURE/COLONY COUNT: CPT

## 2024-02-15 PROCEDURE — 82570 ASSAY OF URINE CREATININE: CPT

## 2024-02-15 PROCEDURE — 83935 ASSAY OF URINE OSMOLALITY: CPT

## 2024-02-15 PROCEDURE — 85025 COMPLETE CBC W/AUTO DIFF WBC: CPT

## 2024-02-15 PROCEDURE — 83930 ASSAY OF BLOOD OSMOLALITY: CPT

## 2024-02-15 PROCEDURE — 6370000000 HC RX 637 (ALT 250 FOR IP): Performed by: STUDENT IN AN ORGANIZED HEALTH CARE EDUCATION/TRAINING PROGRAM

## 2024-02-15 PROCEDURE — 84443 ASSAY THYROID STIM HORMONE: CPT

## 2024-02-15 RX ORDER — ATORVASTATIN CALCIUM 40 MG/1
40 TABLET, FILM COATED ORAL DAILY
Status: DISCONTINUED | OUTPATIENT
Start: 2024-02-15 | End: 2024-02-18 | Stop reason: HOSPADM

## 2024-02-15 RX ORDER — SODIUM CHLORIDE 0.9 % (FLUSH) 0.9 %
5-40 SYRINGE (ML) INJECTION PRN
Status: DISCONTINUED | OUTPATIENT
Start: 2024-02-15 | End: 2024-02-18 | Stop reason: HOSPADM

## 2024-02-15 RX ORDER — ONDANSETRON 2 MG/ML
4 INJECTION INTRAMUSCULAR; INTRAVENOUS EVERY 6 HOURS PRN
Status: DISCONTINUED | OUTPATIENT
Start: 2024-02-15 | End: 2024-02-18 | Stop reason: HOSPADM

## 2024-02-15 RX ORDER — METOPROLOL SUCCINATE 50 MG/1
50 TABLET, EXTENDED RELEASE ORAL DAILY
Status: DISCONTINUED | OUTPATIENT
Start: 2024-02-15 | End: 2024-02-15

## 2024-02-15 RX ORDER — AMLODIPINE BESYLATE 5 MG/1
5 TABLET ORAL DAILY
Status: DISCONTINUED | OUTPATIENT
Start: 2024-02-15 | End: 2024-02-15

## 2024-02-15 RX ORDER — ASPIRIN 81 MG/1
81 TABLET ORAL DAILY
Status: DISCONTINUED | OUTPATIENT
Start: 2024-02-15 | End: 2024-02-18 | Stop reason: HOSPADM

## 2024-02-15 RX ORDER — HYDROXYZINE HYDROCHLORIDE 25 MG/1
25 TABLET, FILM COATED ORAL 3 TIMES DAILY PRN
Status: DISCONTINUED | OUTPATIENT
Start: 2024-02-15 | End: 2024-02-18 | Stop reason: HOSPADM

## 2024-02-15 RX ORDER — SODIUM CHLORIDE 9 MG/ML
INJECTION, SOLUTION INTRAVENOUS PRN
Status: DISCONTINUED | OUTPATIENT
Start: 2024-02-15 | End: 2024-02-18 | Stop reason: HOSPADM

## 2024-02-15 RX ORDER — HYDRALAZINE HYDROCHLORIDE 50 MG/1
50 TABLET, FILM COATED ORAL EVERY 8 HOURS SCHEDULED
Status: DISCONTINUED | OUTPATIENT
Start: 2024-02-15 | End: 2024-02-15

## 2024-02-15 RX ORDER — ENOXAPARIN SODIUM 100 MG/ML
40 INJECTION SUBCUTANEOUS DAILY
Status: DISCONTINUED | OUTPATIENT
Start: 2024-02-15 | End: 2024-02-17

## 2024-02-15 RX ORDER — ACETAMINOPHEN 650 MG/1
650 SUPPOSITORY RECTAL EVERY 6 HOURS PRN
Status: DISCONTINUED | OUTPATIENT
Start: 2024-02-15 | End: 2024-02-18 | Stop reason: HOSPADM

## 2024-02-15 RX ORDER — ONDANSETRON 4 MG/1
4 TABLET, ORALLY DISINTEGRATING ORAL EVERY 8 HOURS PRN
Status: DISCONTINUED | OUTPATIENT
Start: 2024-02-15 | End: 2024-02-18 | Stop reason: HOSPADM

## 2024-02-15 RX ORDER — SODIUM CHLORIDE 0.9 % (FLUSH) 0.9 %
5-40 SYRINGE (ML) INJECTION EVERY 12 HOURS SCHEDULED
Status: DISCONTINUED | OUTPATIENT
Start: 2024-02-15 | End: 2024-02-18 | Stop reason: HOSPADM

## 2024-02-15 RX ORDER — ACETAMINOPHEN 325 MG/1
650 TABLET ORAL EVERY 6 HOURS PRN
Status: DISCONTINUED | OUTPATIENT
Start: 2024-02-15 | End: 2024-02-18 | Stop reason: HOSPADM

## 2024-02-15 RX ORDER — POLYETHYLENE GLYCOL 3350 17 G/17G
17 POWDER, FOR SOLUTION ORAL DAILY PRN
Status: DISCONTINUED | OUTPATIENT
Start: 2024-02-15 | End: 2024-02-18 | Stop reason: HOSPADM

## 2024-02-15 RX ORDER — PANTOPRAZOLE SODIUM 40 MG/1
40 TABLET, DELAYED RELEASE ORAL DAILY
Status: DISCONTINUED | OUTPATIENT
Start: 2024-02-15 | End: 2024-02-18 | Stop reason: HOSPADM

## 2024-02-15 RX ORDER — VITAMIN B COMPLEX
2000 TABLET ORAL DAILY
Status: DISCONTINUED | OUTPATIENT
Start: 2024-02-15 | End: 2024-02-18 | Stop reason: HOSPADM

## 2024-02-15 RX ORDER — SERTRALINE HYDROCHLORIDE 25 MG/1
25 TABLET, FILM COATED ORAL DAILY
Status: DISCONTINUED | OUTPATIENT
Start: 2024-02-15 | End: 2024-02-18 | Stop reason: HOSPADM

## 2024-02-15 RX ADMIN — ASPIRIN 81 MG: 81 TABLET, COATED ORAL at 09:28

## 2024-02-15 RX ADMIN — SODIUM CHLORIDE, PRESERVATIVE FREE 10 ML: 5 INJECTION INTRAVENOUS at 21:22

## 2024-02-15 RX ADMIN — PANTOPRAZOLE SODIUM 40 MG: 40 TABLET, DELAYED RELEASE ORAL at 09:28

## 2024-02-15 RX ADMIN — CHOLECALCIFEROL TAB 25 MCG (1000 UNIT) 2000 UNITS: 25 TAB at 10:10

## 2024-02-15 RX ADMIN — SERTRALINE HYDROCHLORIDE 25 MG: 25 TABLET ORAL at 09:28

## 2024-02-15 RX ADMIN — ATORVASTATIN CALCIUM 40 MG: 40 TABLET, FILM COATED ORAL at 09:27

## 2024-02-15 RX ADMIN — ENOXAPARIN SODIUM 40 MG: 100 INJECTION SUBCUTANEOUS at 09:28

## 2024-02-15 RX ADMIN — SODIUM CHLORIDE, PRESERVATIVE FREE 10 ML: 5 INJECTION INTRAVENOUS at 09:29

## 2024-02-15 ASSESSMENT — PAIN SCALES - GENERAL: PAINLEVEL_OUTOF10: 0

## 2024-02-15 NOTE — ACP (ADVANCE CARE PLANNING)
Advance Care Planning Note      NAME: Katerin Biswas   :  1940   MRN:  [id number]     Date/Time:  2/15/2024 3:24 PM    Active Diagnoses:    Acute on chronic hyponatremia  Essential hypertension versus pseudo hypertension  Hyperlipidemia  MDD/CHLOÉ  CKD 3, at baseline      These active diagnoses are of sufficient risk that focused discussion on advance care planning is indicated in order to allow the patient to thoughtfully consider personal goals of care, and if situations arise that prevent the ability to personally give input, to ensure appropriate representation of their personal desires for different levels and aggressiveness of care.     Discussion:   Code status addressed and wants to be a Full Code.  Patient wants central line and vasopressors if needed.   Patient  would like to assign Sons Johny and Dwayne  as the surrogate decision maker.    Persons present and participating in discussion: Katerin Biswas, Jared Beltran MD.       Time Spent:   Total time spent face-to-face in education and discussion:   16  minutes.         Jared Beltran MD   Hospitalist

## 2024-02-15 NOTE — ED PROVIDER NOTES
EMERGENCY DEPARTMENT HISTORY AND PHYSICAL EXAM      Date: 2/14/2024  Patient Name: Katerin Biswas    History of Presenting Illness     Chief Complaint   Patient presents with    Hypertension       History Provided By: Patient    HPI: Katerin Biswas, 83 y.o. female with PMHx as noted below presents the emergency department for evaluation of high blood pressure.  Patient states she woke up this morning generally feeling unwell with some lightheadedness.  Patient states that she has had difficulty controlling her blood pressure and notes at times will be low at other times will be extremely high.  Patient states that her blood pressure has been above 200 all day today this was, to the emergency department for evaluation.  Patient notes that recently her blood pressure medications have been adjusted.  Notes that her metoprolol been stopped and she had been started on olmesartan-hydrochlorothiazide.    Pt denies any other alleviating or exacerbating factors. Additionally, pt specifically denies any recent fever, chills, headache, nausea, vomiting, abdominal pain, CP, SOB  numbness, weakness, BLE swelling, heart palpitations, urinary sxs, diarrhea, constipation, melena, hematochezia, cough, or congestion.  PCP: Rosalina Flores APRN - NP    Current Facility-Administered Medications   Medication Dose Route Frequency Provider Last Rate Last Admin    sodium chloride 0.9 % bolus 500 mL  500 mL IntraVENous Once Deric Ny MD        labetalol (NORMODYNE;TRANDATE) injection 10 mg  10 mg IntraVENous Once Deric Ny MD         Current Outpatient Medications   Medication Sig Dispense Refill    hydrALAZINE (APRESOLINE) 50 MG tablet Take 1 tablet by mouth 3 times daily 90 tablet 3    amLODIPine (NORVASC) 5 MG tablet Take 1 tablet by mouth daily 90 tablet 1    sodium chloride 1 g tablet Take 1 tablet by mouth 3 times daily for 7 days 21 tablet 0    sertraline (ZOLOFT) 25 MG tablet Take 1 tablet by mouth

## 2024-02-15 NOTE — PROGRESS NOTES
Contacted [facility] and spoke with Martha to arrange ALS transport to [facility] bed 2322. Requested information provided (Dx, wt, demographics, chief complaint, isolation status that is negative, cardiac monitor, and insurance information). ETA of 10 minutes due to medica crew is on site. ED staff made aware.

## 2024-02-15 NOTE — H&P
extremities. No overt focal deficits appreciated     Psych:   Normal affect. Good insight. Cooperative     Other:    N/A             LAB DATA REVIEWED:    Recent Results (from the past 12 hour(s))   Urinalysis with Reflex to Culture    Collection Time: 02/14/24  5:14 PM    Specimen: Urine   Result Value Ref Range    Color, UA YELLOW/STRAW      Appearance CLEAR CLEAR      Specific Gravity, UA 1.010 1.003 - 1.030      pH, Urine 6.0 5.0 - 8.0      Protein, UA Negative NEG mg/dL    Glucose, UA Negative NEG mg/dL    Ketones, Urine Negative NEG mg/dL    Bilirubin Urine Negative NEG      Blood, Urine Negative NEG      Urobilinogen, Urine 0.2 0.2 - 1.0 EU/dL    Nitrite, Urine Negative NEG      Leukocyte Esterase, Urine SMALL (A) NEG      WBC, UA 5-10 0 - 4 /hpf    RBC, UA 0-5 0 - 5 /hpf    Epithelial Cells UA FEW FEW /lpf    BACTERIA, URINE Negative NEG /hpf    Urine Culture if Indicated CULTURE NOT INDICATED BY UA RESULT CNI     EKG 12 Lead    Collection Time: 02/14/24 10:09 PM   Result Value Ref Range    Ventricular Rate 66 BPM    Atrial Rate 66 BPM    P-R Interval 166 ms    QRS Duration 96 ms    Q-T Interval 444 ms    QTc Calculation (Bazett) 465 ms    P Axis 70 degrees    R Axis 38 degrees    T Axis 89 degrees    Diagnosis       Normal sinus rhythm  Nonspecific ST and T wave abnormality  Abnormal ECG  When compared with ECG of 14-FEB-2024 14:14,  MANUAL COMPARISON REQUIRED, DATA IS UNCONFIRMED     Troponin    Collection Time: 02/14/24 11:19 PM   Result Value Ref Range    Troponin, High Sensitivity 14 0 - 51 ng/L         CT Result (most recent):  CT CERVICAL SPINE WO CONTRAST 01/04/2024    Narrative  PROCEDURE INFORMATION:  Exam: CT Cervical Spine Without Contrast  Exam date and time: 1/4/2024 5:16 PM  Age: 83 years old  Clinical indication: Other: Hypertension/head cold/fell last week    TECHNIQUE:  Imaging protocol: Computed tomography of the cervical spine without contrast.  Axial, coronal and sagittal reformatted

## 2024-02-15 NOTE — ED NOTES
Report called to AMENA Cuevas at [facility]. All questions answered.

## 2024-02-15 NOTE — PROGRESS NOTES
Writer called to ED to assess respiratory status of patient. Patient sitting on side of bed. Nauseated. Breath sounds Diminished but clear. No respiratory intervention needed at present.  RR=15, RA O2 SaO2=97. HR 62

## 2024-02-15 NOTE — ED NOTES
2150 in to assess, patient states \"I feel funny\"  then because unresponsive. This RN immediatly called for MD. NiCARdipine was stopped- pulse was present at 44bpm, as shown on the monitor. Patient because arousable within a minute.    Current vitals as follows:   Vitals:    02/14/24 2203 02/14/24 2205   BP: (!) 137/58 (!) 159/87   Pulse: 78 71

## 2024-02-15 NOTE — CONSULTS
Cardiology Consult Note    CC: lightheadedness    PCP:Rosalina Flores, GABY - NP  Reason for consult:  BPs  Requesting MD:  Dr. Beltran  Admit Date: 2/15/2024   Today's Date: 2/15/2024   Cardiologist:  Dr Muñiz 2021.   Cardiac Assessment/Plan:   1) HTN: orthostatic Sx as noted below.  2) Bradycardia and symptomatic premature atrial beats previously  3) Dyslipidemia  4) GERD  5) HypoNa    Admitted w/ labile BP as noted below.  Current cardiac meds: asa; lipitor 40;     Orthostatics done per pt but not documented so far    Rec: orthostatic BP monitoring now and q shift; treat standing BP only. D/W nursing     Echo 2/15/24: EF 65-70%; Mod LVH; Normal RV; no valve dz.      ____________________________________________________________________  Katerin GARCÍA Zulay is a 83 y.o. female presents with Hyponatremia [E87.1].    As noted in H&P: \" 83 y.o.  female with PMHx as listed below presenting to the emergency department with for evaluation of persistently high pressure associated with some lightheadedness.  Notes that she has been experiencing increasing lightheadedness on positional change.  Has been working with her PCP to control her blood pressure and was recently started on olmesartan-hydrochlorothiazide and taken off of metoprolol.  She was found to have hyponatremia to 123 in setting of chronic hyponatremia ranging from 130-135 which she reports has never been evaluated nephrologist and she has been started on sodium tabs by her PCP.  Additionally, while in ED patient found to have marked orthostatic events and had profound response to nicardipine infusion going from systolics of 190s to 120s on 2.5 infusion rate with reported syncopal event.       On arrival, patient remains hypertensive with systolics 190s/60s, saturating mid 90s on room air.  Denies symptomatology.\"    ______________________________________________________________________    The patient reports no CP/pressure; no dyspnea;   Rare 
yes [] no   Liver enlargement  []  yes []  no                Spleen enlargement  []  yes []  no     distended []  yes [] no     bowel sound  [] hypoactive   [] hyperactive    LYMPH:    Neck []  yes []  no       Axillae []  yes []  no    SKIN:   Rashes []  yes   []  no    Ulcers []  yes   []  no               [] tight to palpitation    skin turgor []  good  [] poor  [] decreased               Cyanosis/clubbing []  yes []  no    NEUR:   [x] cranial nerves II-XII grossly intact       [] Cranial nerves deficit                 []  facial droop    []  slurred speech   [] aphasic     [] Strength normal     []  weakness  []  LUE  []   RUE/ []  LLE  []   RLE    follows commands  [x]  yes []  no           PSYCH:   insight [] poor [x] good   Alert and Oriented to  [x] person  [x] place  [x]  time                    [] depressed [] anxious [] agitated  [] lethargic [] stuporous  [] sedated     LAB DATA REVIEWED:    Recent Labs     02/14/24  1434 02/15/24  0549   WBC 7.3 6.8   HGB 12.6 11.6   HCT 35.9 34.2*    235     Recent Labs     02/14/24  1434 02/15/24  0549   * 126*   K 4.4 4.2   CL 87* 92*   CO2 25 24   BUN 22* 20   MG  --  1.9     No results for input(s): \"ALT\", \"ALB\", \"GLOB\", \"GGT\", \"AML\" in the last 72 hours.    Invalid input(s): \"SGOT\", \"GPT\", \"AP\", \"TBIL\", \"TBILI\", \"AMYP\", \"LPSE\", \"HLPSE\"  No results for input(s): \"INR\", \"APTT\" in the last 72 hours.    Invalid input(s): \"PTP\"   No results for input(s): \"TIBC\", \"FERR\" in the last 72 hours.    Invalid input(s): \"FE\", \"PSAT\"   No results for input(s): \"PH\", \"PCO2\", \"PO2\" in the last 72 hours.  No results for input(s): \"CPK\", \"CKMB\", \"TROPONINI\" in the last 72 hours.  No results found for: \"GLUCPOC\"    Procedures: see electronic medical records for all procedures/Xrays and details which were not copied into this note but were reviewed prior to creation of Plan.

## 2024-02-16 LAB
ANION GAP SERPL CALC-SCNC: 9 MMOL/L (ref 5–15)
BASOPHILS # BLD: 0.1 K/UL (ref 0–0.1)
BASOPHILS NFR BLD: 1 % (ref 0–1)
BUN SERPL-MCNC: 23 MG/DL (ref 6–20)
BUN/CREAT SERPL: 15 (ref 12–20)
CALCIUM SERPL-MCNC: 9.4 MG/DL (ref 8.5–10.1)
CHLORIDE SERPL-SCNC: 90 MMOL/L (ref 97–108)
CO2 SERPL-SCNC: 23 MMOL/L (ref 21–32)
CREAT SERPL-MCNC: 1.53 MG/DL (ref 0.55–1.02)
DIFFERENTIAL METHOD BLD: NORMAL
EOSINOPHIL # BLD: 0.3 K/UL (ref 0–0.4)
EOSINOPHIL NFR BLD: 4 % (ref 0–7)
ERYTHROCYTE [DISTWIDTH] IN BLOOD BY AUTOMATED COUNT: 12.7 % (ref 11.5–14.5)
GLUCOSE SERPL-MCNC: 99 MG/DL (ref 65–100)
HCT VFR BLD AUTO: 35 % (ref 35–47)
HGB BLD-MCNC: 11.8 G/DL (ref 11.5–16)
IMM GRANULOCYTES # BLD AUTO: 0 K/UL (ref 0–0.04)
IMM GRANULOCYTES NFR BLD AUTO: 0 % (ref 0–0.5)
LYMPHOCYTES # BLD: 2.1 K/UL (ref 0.8–3.5)
LYMPHOCYTES NFR BLD: 32 % (ref 12–49)
MCH RBC QN AUTO: 29.8 PG (ref 26–34)
MCHC RBC AUTO-ENTMCNC: 33.7 G/DL (ref 30–36.5)
MCV RBC AUTO: 88.4 FL (ref 80–99)
MONOCYTES # BLD: 0.7 K/UL (ref 0–1)
MONOCYTES NFR BLD: 11 % (ref 5–13)
NEUTS SEG # BLD: 3.5 K/UL (ref 1.8–8)
NEUTS SEG NFR BLD: 52 % (ref 32–75)
NRBC # BLD: 0 K/UL (ref 0–0.01)
NRBC BLD-RTO: 0 PER 100 WBC
PLATELET # BLD AUTO: 237 K/UL (ref 150–400)
PMV BLD AUTO: 9.2 FL (ref 8.9–12.9)
POTASSIUM SERPL-SCNC: 4.4 MMOL/L (ref 3.5–5.1)
RBC # BLD AUTO: 3.96 M/UL (ref 3.8–5.2)
SODIUM SERPL-SCNC: 122 MMOL/L (ref 136–145)
SODIUM SERPL-SCNC: 124 MMOL/L (ref 136–145)
WBC # BLD AUTO: 6.8 K/UL (ref 3.6–11)

## 2024-02-16 PROCEDURE — 6370000000 HC RX 637 (ALT 250 FOR IP): Performed by: STUDENT IN AN ORGANIZED HEALTH CARE EDUCATION/TRAINING PROGRAM

## 2024-02-16 PROCEDURE — 6360000002 HC RX W HCPCS: Performed by: STUDENT IN AN ORGANIZED HEALTH CARE EDUCATION/TRAINING PROGRAM

## 2024-02-16 PROCEDURE — 36415 COLL VENOUS BLD VENIPUNCTURE: CPT

## 2024-02-16 PROCEDURE — 6370000000 HC RX 637 (ALT 250 FOR IP): Performed by: INTERNAL MEDICINE

## 2024-02-16 PROCEDURE — 80048 BASIC METABOLIC PNL TOTAL CA: CPT

## 2024-02-16 PROCEDURE — 85025 COMPLETE CBC W/AUTO DIFF WBC: CPT

## 2024-02-16 PROCEDURE — 84295 ASSAY OF SERUM SODIUM: CPT

## 2024-02-16 PROCEDURE — 2580000003 HC RX 258: Performed by: STUDENT IN AN ORGANIZED HEALTH CARE EDUCATION/TRAINING PROGRAM

## 2024-02-16 PROCEDURE — 1100000003 HC PRIVATE W/ TELEMETRY

## 2024-02-16 RX ORDER — LOSARTAN POTASSIUM 25 MG/1
25 TABLET ORAL DAILY
Status: DISCONTINUED | OUTPATIENT
Start: 2024-02-16 | End: 2024-02-18 | Stop reason: HOSPADM

## 2024-02-16 RX ADMIN — SODIUM CHLORIDE, PRESERVATIVE FREE 10 ML: 5 INJECTION INTRAVENOUS at 09:42

## 2024-02-16 RX ADMIN — ENOXAPARIN SODIUM 40 MG: 100 INJECTION SUBCUTANEOUS at 09:41

## 2024-02-16 RX ADMIN — LOSARTAN POTASSIUM 25 MG: 25 TABLET, FILM COATED ORAL at 09:55

## 2024-02-16 RX ADMIN — SERTRALINE HYDROCHLORIDE 25 MG: 25 TABLET ORAL at 09:42

## 2024-02-16 RX ADMIN — PANTOPRAZOLE SODIUM 40 MG: 40 TABLET, DELAYED RELEASE ORAL at 09:42

## 2024-02-16 RX ADMIN — Medication 15 G: at 20:26

## 2024-02-16 RX ADMIN — Medication 15 G: at 09:55

## 2024-02-16 RX ADMIN — CHOLECALCIFEROL TAB 25 MCG (1000 UNIT) 2000 UNITS: 25 TAB at 09:42

## 2024-02-16 RX ADMIN — ASPIRIN 81 MG: 81 TABLET, COATED ORAL at 09:42

## 2024-02-16 RX ADMIN — ATORVASTATIN CALCIUM 40 MG: 40 TABLET, FILM COATED ORAL at 09:42

## 2024-02-16 RX ADMIN — SODIUM CHLORIDE, PRESERVATIVE FREE 10 ML: 5 INJECTION INTRAVENOUS at 20:26

## 2024-02-16 NOTE — CARE COORDINATION
Care Management Initial Assessment       RUR: 9%  Readmission? No  1st IM letter given? Yes    CM made room visit with patient to complete initial assessment. Patient's plan is to return home vs go to one of her son's home pending how she is feeling upon d/c.          02/16/24 0945   Service Assessment   Patient Orientation Alert and Oriented   Cognition Alert   History Provided By Patient   Primary Caregiver Self   Support Systems Children   Patient's Healthcare Decision Maker is: Legal Next of Kin   PCP Verified by CM Yes   Last Visit to PCP Within last 3 months   Prior Functional Level Independent in ADLs/IADLs   Can patient return to prior living arrangement Yes   Family able to assist with home care needs: Yes   Financial Resources Medicare   Social/Functional History   Lives With Alone   Type of Home House   Home Layout One level   Active  Yes   Discharge Planning   Patient expects to be discharged to: House       Advance Care Planning     General Advance Care Planning (ACP) Conversation    Date of Conversation: 2/16/2024  Conducted with: Patient with Decision Making Capacity    Healthcare Decision Maker:    Primary Decision Maker: Dwayne Biswas - Child - [phone]    Secondary Decision Maker: Johny Biswas - Child - [phone]  Click here to complete Healthcare Decision Makers including selection of the Healthcare Decision Maker Relationship (ie \"Primary\").   Today we documented Decision Maker(s) consistent with Legal Next of Kin hierarchy.    Content/Action Overview:  DECLINED ACP Conversation - will revisit periodically  Reviewed DNR/DNI and patient elects Full Code (Attempt Resuscitation)             PORTER Diaz, CM  [phone]

## 2024-02-17 LAB
ANION GAP SERPL CALC-SCNC: 11 MMOL/L (ref 5–15)
BASOPHILS # BLD: 0.1 K/UL (ref 0–0.1)
BASOPHILS NFR BLD: 1 % (ref 0–1)
BUN SERPL-MCNC: 50 MG/DL (ref 6–20)
BUN/CREAT SERPL: 30 (ref 12–20)
CALCIUM SERPL-MCNC: 9.1 MG/DL (ref 8.5–10.1)
CHLORIDE SERPL-SCNC: 95 MMOL/L (ref 97–108)
CO2 SERPL-SCNC: 23 MMOL/L (ref 21–32)
CREAT SERPL-MCNC: 1.65 MG/DL (ref 0.55–1.02)
DIFFERENTIAL METHOD BLD: ABNORMAL
EOSINOPHIL # BLD: 0.3 K/UL (ref 0–0.4)
EOSINOPHIL NFR BLD: 5 % (ref 0–7)
ERYTHROCYTE [DISTWIDTH] IN BLOOD BY AUTOMATED COUNT: 12.7 % (ref 11.5–14.5)
GLUCOSE SERPL-MCNC: 107 MG/DL (ref 65–100)
HCT VFR BLD AUTO: 34.5 % (ref 35–47)
HGB BLD-MCNC: 11.5 G/DL (ref 11.5–16)
IMM GRANULOCYTES # BLD AUTO: 0 K/UL (ref 0–0.04)
IMM GRANULOCYTES NFR BLD AUTO: 0 % (ref 0–0.5)
LYMPHOCYTES # BLD: 2.2 K/UL (ref 0.8–3.5)
LYMPHOCYTES NFR BLD: 31 % (ref 12–49)
MCH RBC QN AUTO: 29.7 PG (ref 26–34)
MCHC RBC AUTO-ENTMCNC: 33.3 G/DL (ref 30–36.5)
MCV RBC AUTO: 89.1 FL (ref 80–99)
MONOCYTES # BLD: 0.8 K/UL (ref 0–1)
MONOCYTES NFR BLD: 11 % (ref 5–13)
NEUTS SEG # BLD: 3.7 K/UL (ref 1.8–8)
NEUTS SEG NFR BLD: 52 % (ref 32–75)
NRBC # BLD: 0 K/UL (ref 0–0.01)
NRBC BLD-RTO: 0 PER 100 WBC
PLATELET # BLD AUTO: 237 K/UL (ref 150–400)
PMV BLD AUTO: 9.3 FL (ref 8.9–12.9)
POTASSIUM SERPL-SCNC: 4.4 MMOL/L (ref 3.5–5.1)
RBC # BLD AUTO: 3.87 M/UL (ref 3.8–5.2)
SODIUM SERPL-SCNC: 129 MMOL/L (ref 136–145)
WBC # BLD AUTO: 7.2 K/UL (ref 3.6–11)

## 2024-02-17 PROCEDURE — 36415 COLL VENOUS BLD VENIPUNCTURE: CPT

## 2024-02-17 PROCEDURE — 6370000000 HC RX 637 (ALT 250 FOR IP): Performed by: STUDENT IN AN ORGANIZED HEALTH CARE EDUCATION/TRAINING PROGRAM

## 2024-02-17 PROCEDURE — 6370000000 HC RX 637 (ALT 250 FOR IP): Performed by: INTERNAL MEDICINE

## 2024-02-17 PROCEDURE — 80048 BASIC METABOLIC PNL TOTAL CA: CPT

## 2024-02-17 PROCEDURE — 1100000003 HC PRIVATE W/ TELEMETRY

## 2024-02-17 PROCEDURE — 6360000002 HC RX W HCPCS: Performed by: STUDENT IN AN ORGANIZED HEALTH CARE EDUCATION/TRAINING PROGRAM

## 2024-02-17 PROCEDURE — 2580000003 HC RX 258: Performed by: STUDENT IN AN ORGANIZED HEALTH CARE EDUCATION/TRAINING PROGRAM

## 2024-02-17 PROCEDURE — 85025 COMPLETE CBC W/AUTO DIFF WBC: CPT

## 2024-02-17 RX ORDER — ENOXAPARIN SODIUM 100 MG/ML
30 INJECTION SUBCUTANEOUS DAILY
Status: DISCONTINUED | OUTPATIENT
Start: 2024-02-18 | End: 2024-02-18 | Stop reason: HOSPADM

## 2024-02-17 RX ADMIN — ASPIRIN 81 MG: 81 TABLET, COATED ORAL at 09:12

## 2024-02-17 RX ADMIN — ATORVASTATIN CALCIUM 40 MG: 40 TABLET, FILM COATED ORAL at 09:12

## 2024-02-17 RX ADMIN — SERTRALINE HYDROCHLORIDE 25 MG: 25 TABLET ORAL at 09:12

## 2024-02-17 RX ADMIN — Medication 15 G: at 09:12

## 2024-02-17 RX ADMIN — Medication 15 G: at 20:37

## 2024-02-17 RX ADMIN — PANTOPRAZOLE SODIUM 40 MG: 40 TABLET, DELAYED RELEASE ORAL at 09:12

## 2024-02-17 RX ADMIN — LOSARTAN POTASSIUM 25 MG: 25 TABLET, FILM COATED ORAL at 09:12

## 2024-02-17 RX ADMIN — CHOLECALCIFEROL TAB 25 MCG (1000 UNIT) 2000 UNITS: 25 TAB at 09:12

## 2024-02-17 RX ADMIN — ENOXAPARIN SODIUM 40 MG: 100 INJECTION SUBCUTANEOUS at 09:12

## 2024-02-17 RX ADMIN — SODIUM CHLORIDE, PRESERVATIVE FREE 10 ML: 5 INJECTION INTRAVENOUS at 20:37

## 2024-02-17 RX ADMIN — SODIUM CHLORIDE, PRESERVATIVE FREE 10 ML: 5 INJECTION INTRAVENOUS at 09:13

## 2024-02-17 ASSESSMENT — PAIN SCALES - GENERAL: PAINLEVEL_OUTOF10: 0

## 2024-02-18 VITALS
RESPIRATION RATE: 20 BRPM | OXYGEN SATURATION: 97 % | TEMPERATURE: 97.6 F | DIASTOLIC BLOOD PRESSURE: 68 MMHG | BODY MASS INDEX: 28.91 KG/M2 | WEIGHT: 169.31 LBS | HEIGHT: 64 IN | HEART RATE: 83 BPM | SYSTOLIC BLOOD PRESSURE: 143 MMHG

## 2024-02-18 LAB
ANION GAP SERPL CALC-SCNC: 4 MMOL/L (ref 5–15)
BACTERIA SPEC CULT: ABNORMAL
BACTERIA SPEC CULT: ABNORMAL
BASOPHILS # BLD: 0.1 K/UL (ref 0–0.1)
BASOPHILS NFR BLD: 1 % (ref 0–1)
BUN SERPL-MCNC: 65 MG/DL (ref 6–20)
BUN/CREAT SERPL: 41 (ref 12–20)
CALCIUM SERPL-MCNC: 9.2 MG/DL (ref 8.5–10.1)
CC UR VC: ABNORMAL
CHLORIDE SERPL-SCNC: 100 MMOL/L (ref 97–108)
CO2 SERPL-SCNC: 27 MMOL/L (ref 21–32)
CREAT SERPL-MCNC: 1.6 MG/DL (ref 0.55–1.02)
DIFFERENTIAL METHOD BLD: ABNORMAL
EOSINOPHIL # BLD: 0.3 K/UL (ref 0–0.4)
EOSINOPHIL NFR BLD: 5 % (ref 0–7)
ERYTHROCYTE [DISTWIDTH] IN BLOOD BY AUTOMATED COUNT: 12.8 % (ref 11.5–14.5)
GLUCOSE SERPL-MCNC: 102 MG/DL (ref 65–100)
HCT VFR BLD AUTO: 34.6 % (ref 35–47)
HGB BLD-MCNC: 11.5 G/DL (ref 11.5–16)
IMM GRANULOCYTES # BLD AUTO: 0 K/UL (ref 0–0.04)
IMM GRANULOCYTES NFR BLD AUTO: 0 % (ref 0–0.5)
LYMPHOCYTES # BLD: 2.1 K/UL (ref 0.8–3.5)
LYMPHOCYTES NFR BLD: 33 % (ref 12–49)
MAGNESIUM SERPL-MCNC: 2.3 MG/DL (ref 1.6–2.4)
MCH RBC QN AUTO: 30 PG (ref 26–34)
MCHC RBC AUTO-ENTMCNC: 33.2 G/DL (ref 30–36.5)
MCV RBC AUTO: 90.3 FL (ref 80–99)
MONOCYTES # BLD: 0.7 K/UL (ref 0–1)
MONOCYTES NFR BLD: 11 % (ref 5–13)
NEUTS SEG # BLD: 3.1 K/UL (ref 1.8–8)
NEUTS SEG NFR BLD: 50 % (ref 32–75)
NRBC # BLD: 0 K/UL (ref 0–0.01)
NRBC BLD-RTO: 0 PER 100 WBC
PHOSPHATE SERPL-MCNC: 3.4 MG/DL (ref 2.6–4.7)
PLATELET # BLD AUTO: 224 K/UL (ref 150–400)
PMV BLD AUTO: 9.1 FL (ref 8.9–12.9)
POTASSIUM SERPL-SCNC: 4.6 MMOL/L (ref 3.5–5.1)
RBC # BLD AUTO: 3.83 M/UL (ref 3.8–5.2)
SERVICE CMNT-IMP: ABNORMAL
SODIUM SERPL-SCNC: 131 MMOL/L (ref 136–145)
WBC # BLD AUTO: 6.4 K/UL (ref 3.6–11)

## 2024-02-18 PROCEDURE — 51798 US URINE CAPACITY MEASURE: CPT

## 2024-02-18 PROCEDURE — 84100 ASSAY OF PHOSPHORUS: CPT

## 2024-02-18 PROCEDURE — 6370000000 HC RX 637 (ALT 250 FOR IP): Performed by: INTERNAL MEDICINE

## 2024-02-18 PROCEDURE — 85025 COMPLETE CBC W/AUTO DIFF WBC: CPT

## 2024-02-18 PROCEDURE — 80048 BASIC METABOLIC PNL TOTAL CA: CPT

## 2024-02-18 PROCEDURE — 2580000003 HC RX 258: Performed by: STUDENT IN AN ORGANIZED HEALTH CARE EDUCATION/TRAINING PROGRAM

## 2024-02-18 PROCEDURE — 36415 COLL VENOUS BLD VENIPUNCTURE: CPT

## 2024-02-18 PROCEDURE — 83735 ASSAY OF MAGNESIUM: CPT

## 2024-02-18 PROCEDURE — 6370000000 HC RX 637 (ALT 250 FOR IP): Performed by: STUDENT IN AN ORGANIZED HEALTH CARE EDUCATION/TRAINING PROGRAM

## 2024-02-18 RX ORDER — LOSARTAN POTASSIUM 25 MG/1
25 TABLET ORAL DAILY
Qty: 30 TABLET | Refills: 0 | Status: SHIPPED | OUTPATIENT
Start: 2024-02-19 | End: 2024-02-23 | Stop reason: SDUPTHER

## 2024-02-18 RX ADMIN — CHOLECALCIFEROL TAB 25 MCG (1000 UNIT) 2000 UNITS: 25 TAB at 09:18

## 2024-02-18 RX ADMIN — SERTRALINE HYDROCHLORIDE 25 MG: 25 TABLET ORAL at 09:19

## 2024-02-18 RX ADMIN — ASPIRIN 81 MG: 81 TABLET, COATED ORAL at 09:18

## 2024-02-18 RX ADMIN — SODIUM CHLORIDE, PRESERVATIVE FREE 10 ML: 5 INJECTION INTRAVENOUS at 09:20

## 2024-02-18 RX ADMIN — Medication 15 G: at 09:15

## 2024-02-18 RX ADMIN — ATORVASTATIN CALCIUM 40 MG: 40 TABLET, FILM COATED ORAL at 09:15

## 2024-02-18 RX ADMIN — LOSARTAN POTASSIUM 25 MG: 25 TABLET, FILM COATED ORAL at 09:18

## 2024-02-18 RX ADMIN — PANTOPRAZOLE SODIUM 40 MG: 40 TABLET, DELAYED RELEASE ORAL at 09:19

## 2024-02-18 NOTE — PLAN OF CARE
Problem: Discharge Planning  Goal: Discharge to home or other facility with appropriate resources  Outcome: Progressing     Problem: ABCDS Injury Assessment  Goal: Absence of physical injury  Outcome: Progressing     Problem: Safety - Adult  Goal: Free from fall injury  Outcome: Progressing     Problem: Pain  Goal: Verbalizes/displays adequate comfort level or baseline comfort level  Outcome: Progressing     
  Problem: Discharge Planning  Goal: Discharge to home or other facility with appropriate resources  Outcome: Progressing     Problem: ABCDS Injury Assessment  Goal: Absence of physical injury  Outcome: Progressing     Problem: Safety - Adult  Goal: Free from fall injury  Outcome: Progressing     Problem: Pain  Goal: Verbalizes/displays adequate comfort level or baseline comfort level  Outcome: Progressing     
  Problem: Discharge Planning  Goal: Discharge to home or other facility with appropriate resources  Outcome: Progressing     Problem: ABCDS Injury Assessment  Goal: Absence of physical injury  Outcome: Progressing     Problem: Safety - Adult  Goal: Free from fall injury  Outcome: Progressing     Problem: Pain  Goal: Verbalizes/displays adequate comfort level or baseline comfort level  Outcome: Progressing     Problem: Chronic Conditions and Co-morbidities  Goal: Patient's chronic conditions and co-morbidity symptoms are monitored and maintained or improved  Outcome: Progressing     
  Problem: Safety - Adult  Goal: Free from fall injury  Outcome: Progressing  Flowsheets (Taken 2/17/2024 0252)  Free From Fall Injury:   Instruct family/caregiver on patient safety   Based on caregiver fall risk screen, instruct family/caregiver to ask for assistance with transferring infant if caregiver noted to have fall risk factors     Problem: ABCDS Injury Assessment  Goal: Absence of physical injury  Outcome: Progressing  Flowsheets (Taken 2/17/2024 0252)  Absence of Physical Injury: Implement safety measures based on patient assessment     Problem: Discharge Planning  Goal: Discharge to home or other facility with appropriate resources  Outcome: Progressing  Flowsheets (Taken 2/17/2024 0252)  Discharge to home or other facility with appropriate resources:   Identify barriers to discharge with patient and caregiver   Identify discharge learning needs (meds, wound care, etc)   Refer to discharge planning if patient needs post-hospital services based on physician order or complex needs related to functional status, cognitive ability or social support system     
0700. Bedside shift change report given to AMENA Crandall (oncoming nurse) by AMENA Parra (offgoing nurse). Report included the following information: Nurse Handoff Report, Index, Intake/Output, MAR, Recent Labs and Imaging Results, and Cardiac Rhythm NSR.      Problem: Chronic Conditions and Co-morbidities  Goal: Patient's chronic conditions and co-morbidity symptoms are monitored and maintained or improved  Outcome: Progressing  Flowsheets (Taken 2/17/2024 0648)  Care Plan - Patient's Chronic Conditions and Co-Morbidity Symptoms are Monitored and Maintained or Improved:   Monitor and assess patient's chronic conditions and comorbid symptoms for stability, deterioration, or improvement   Collaborate with multidisciplinary team to address chronic and comorbid conditions and prevent exacerbation or deterioration   Update acute care plan with appropriate goals if chronic or comorbid symptoms are exacerbated and prevent overall improvement and discharge     Problem: Safety - Adult  Goal: Free from fall injury  2/17/2024 0648 by Eduard Paula RN  Outcome: Progressing  Flowsheets (Taken 2/17/2024 0252)  Free From Fall Injury:   Instruct family/caregiver on patient safety   Based on caregiver fall risk screen, instruct family/caregiver to ask for assistance with transferring infant if caregiver noted to have fall risk factors  2/17/2024 0647 by Eduard Paula RN  Outcome: Progressing  2/17/2024 0252 by Eduard Paula RN  Outcome: Progressing  Flowsheets (Taken 2/17/2024 0252)  Free From Fall Injury:   Instruct family/caregiver on patient safety   Based on caregiver fall risk screen, instruct family/caregiver to ask for assistance with transferring infant if caregiver noted to have fall risk factors     Problem: Discharge Planning  Goal: Discharge to home or other facility with appropriate resources  2/17/2024 0648 by Eduard Paula RN  Outcome: Progressing  Flowsheets (Taken 2/17/2024 
César James Sandra D. Passek, Jason Anderson, Issa Bowen, -PAC “6-Clicks” Functional Assessment Scores Predict Acute Care Hospital Discharge Destination, Physical Therapy, Volume 94, Issue 9, 1 September 2014, Pages 9339-4676, [email or web address]    Pain Rating:  Denies pain, head \"pressure.\"     Pain Intervention(s):   rest and repositioning    Activity Tolerance:   Fair     After treatment:   Patient left in no apparent distress sitting up in chair, Call bell within reach, and Bed/ chair alarm activated    COMMUNICATION/EDUCATION:   The patient's plan of care was discussed with: physical therapist and registered nurse    Patient Education  Education Given To: Patient  Education Provided: Role of Therapy;Plan of Care;ADL Adaptive Strategies;Transfer Training;Energy Conservation;Fall Prevention Strategies;Equipment  Education Method: Verbal;Demonstration  Barriers to Learning: None  Education Outcome: Verbalized understanding    Thank you for this referral.  Macey Roger OT  Minutes: 44    Occupational Therapy Evaluation Charge Determination   History Examination Decision-Making   LOW Complexity : Brief history review  LOW Complexity: 1-3 Performance deficits relating to physical, cognitive, or psychosocial skills that result in activity limitations and/or participation restrictions LOW Complexity: No comorbidities that affect functional and  no verbal  or physical assist needed to complete eval tasks   Based on the above components, the patient evaluation is determined to be of the following complexity level: Low

## 2024-02-18 NOTE — DISCHARGE INSTRUCTIONS
Hyponatremia: Care Instructions  Your Care Instructions     Hyponatremia (say \"ym-ze-mbl-TREE-tara-uh\") means that you don't have enough sodium in your blood. It can cause nausea, vomiting, and headaches. Or you may not feel hungry. In serious cases, it can cause seizures, a coma, or even death.  Hyponatremia is not a disease. It is a problem caused by something else, such as medicines or exercising for a long time in hot weather.  You can get hyponatremia if you lose a lot of fluids and then you drink a lot of water or other liquids that don't have much sodium. You can also get it if you have kidney, liver, heart, or other health problems.  Treatment is focused on getting your sodium levels back to normal.  Follow-up care is a key part of your treatment and safety. Be sure to make and go to all appointments, and call your doctor if you are having problems. It's also a good idea to know your test results and keep a list of the medicines you take.  How can you care for yourself at home?  If your doctor recommends it, drink fluids that have sodium. Sports drinks are a good choice. Or you can eat salty foods.  If your doctor recommends it, limit the amount of water you drink. And limit fluids that are mostly water. These include tea, coffee, and juice.  Take your medicines exactly as prescribed. Call your doctor if you have any problems with your medicine.  Get your sodium levels tested when your doctor tells you to.  When should you call for help?   Call 911 anytime you think you may need emergency care. For example, call if:    You have a seizure.     You passed out (lost consciousness).   Call your doctor now or seek immediate medical care if:    You are confused or it is hard to focus.     You have little or no appetite.     You feel sick to your stomach or you vomit.     You have a headache.     You have mood changes.     You feel more tired than usual.   Watch closely for changes in your health, and be sure

## 2024-02-18 NOTE — DISCHARGE SUMMARY
Patient was discharge from [facility] this shift. Patient was discharge home with family care. Patient and son were notified of discharge from nurse.   Patient's IV was removed and the patient kept her own clothing on. The nurse went over the discharge paperwork with the patient and her son. Both of them stated understanding. The patient was propelled to the front door of the hospital via wheelchair by staff. The patient had private transportation home.

## 2024-02-18 NOTE — PROGRESS NOTES
Hospitalist Progress Note    NAME:   Katerin Biswas   : 1940   MRN: [id number]     Date/Time: 2024 3:43 PM  Patient PCP: Rosalina Flores APRN - NP    Estimated discharge date:   Barriers: Improvement of sodium, blood pressure      Assessment / Plan:    Hyponatremia acute on chronic  -Most likely due to combination of hydrochlorothiazide and SIADH  -Sodium is trending down and is 122.  -Start fluid restriction.  Started on urea.  Renal following and appreciate recommendations.  Check BMP this evening.  -Discontinue hydrochlorothiazide at discharge    Labile blood pressure  -Patient noted to have wide variations in her blood pressure  -She is not orthostatic  -Continue added losartan.  Recheck orthostatic vitals.  Noted recommendations from cardiology  -Consider increasing the dose of losartan based on blood pressure    CKD stage III  Depression  Anxiety  Dyslipidemia  -Creatinine is close to baseline of around 1.5  -Continue Zoloft, Lipitor, PPI      Medical Decision Making:   I personally reviewed labs: CBC, BMP  I personally reviewed imaging:  I personally reviewed EKG: Telemetry monitoring  Toxic drug monitoring: Monitor sodium while on urea  Discussed case with: Nephrology, pharmacy,         Code Status: Full code  DVT Prophylaxis: Lovenox  GI Prophylaxis:    Subjective:     Chief Complaint / Reason for Physician Visit  No nausea or vomiting.  No confusion.  No chest pain or shortness of breath  No dizziness  Overnight events noted      Objective:     VITALS:   Last 24hrs VS reviewed since prior progress note. Most recent are:  Patient Vitals for the past 24 hrs:   BP Temp Temp src Pulse Resp SpO2 Weight   24 0955 (!) 159/60 -- -- -- -- -- --   24 0808 (!) 163/65 97.8 °F (36.6 °C) Oral 75 25 97 % --   24 0600 -- -- -- -- -- -- 76.9 kg (169 lb 8.5 oz)   24 0242 (!) 167/72 98.4 °F (36.9 °C) Oral 63 12 -- --   24 0000 (!) 174/76 98 °F (36.7 °C) 
      Hospitalist Progress Note    NAME:   Katerin Biswas   : 1940   MRN: [id number]     Date/Time: 2024 11:30 AM  Patient PCP: Rosalina Flores APRN - NP    Estimated discharge date:   Barriers: Improvement of sodium, blood pressure      Assessment / Plan:    Hyponatremia acute on chronic  -Most likely due to combination of hydrochlorothiazide and SIADH  -Sodium is trending down and is 122.  -Start fluid restriction.  Started on urea.  Renal following and appreciate recommendations.  Check BMP this evening.  -Discontinue hydrochlorothiazide at discharge    Labile blood pressure  -Patient noted to have wide variations in her blood pressure  -She is not orthostatic  -Continue added losartan.  Recheck orthostatic vitals.  Noted recommendations from cardiology  -Consider increasing the dose of losartan based on blood pressure  -Patient complaining from dizziness, will check orthostatic    CKD stage III  Depression  Anxiety  Dyslipidemia  -Creatinine is close to baseline of around 1.5  -Continue Zoloft, Lipitor, PPI      Medical Decision Making:   I personally reviewed labs: CBC, BMP  I personally reviewed imaging:  I personally reviewed EKG: Telemetry monitoring  Toxic drug monitoring: Monitor sodium while on urea  Discussed case with: Nephrology, pharmacy,         Code Status: Full code  DVT Prophylaxis: Lovenox  GI Prophylaxis:    Subjective:     Chief Complaint / Reason for Physician Visit  No nausea or vomiting.  No confusion.  No chest pain or shortness of breath  No dizziness  Overnight events noted      Objective:     VITALS:   Last 24hrs VS reviewed since prior progress note. Most recent are:  Patient Vitals for the past 24 hrs:   BP Temp Temp src Pulse Resp SpO2 Weight   24 0729 (!) 151/64 98.5 °F (36.9 °C) Oral 69 17 97 % --   24 0600 -- -- -- -- -- -- 76.8 kg (169 lb 5 oz)   24 0254 (!) 124/53 98 °F (36.7 °C) Oral 72 -- 98 % --   24 2305 139/71 98.6 
  Physician Progress Note      PATIENT:               HARRY ROSA  CSN #:                  [id number]  :                       1940  ADMIT DATE:       2/15/2024 3:28 AM  DISCH DATE:  RESPONDING  PROVIDER #:        Jared Beltran MD          QUERY TEXT:    Pt admitted, Pt noted to have elevated Blood pressures, lightheadedness. If   possible, please document in progress notes and discharge summary if you are   evaluating and/or treating any of the following:    The medical record reflects the following:  Risk Factors: Hx HTN, elevated BP, lightheadedness.    Clinical Indicators: H&P: \"On arrival, patient remains hypertensive with   systolic 190s/60s, saturating mid 90s on room air\".  Essential hypertension versus pseudo hypertension    Treatment: manual BP checks, Cardiology consult, orthostatic Blood pressure,   telemetry monitoring, vitals per unit protocol.    Hypertensive Urgency: Defined as SBP of >180 OR DBP >120 w/o associated organ   damage. S/s may or may not be present, but can include severe headache, SOB,   epistaxis, severe anxiety. Tx: adjustment of oral BP medication; IV meds not   usually required.    Hypertensive Emergency: SBP of >180 OR DBP >120 w/ associated organ damage   (CVA, MI, acute CHF, AURORA, encephalopathy, pulmonary edema, and unstable   angina). Documentation should include specific organ affected.  Requires   immediate treatment, usually with IV meds.    Hypertensive Crisis, unspecified: SBP of > 180 OR DBP > 120 and includes   damage to blood vessels, including inflammation, leakage of fluid or blood and   can cause stroke, headache, heart failure and eclampsia.    Source: [employer] Training Guide and Quick Reference Guide  Options provided:  -- Hypertensive Crisis  -- Hypertensive Emergency  -- Hypertensive Urgency  -- Other - I will add my own diagnosis  -- Disagree - Not applicable / Not valid  -- Disagree - Clinically unable to determine / Unknown  -- Refer to Clinical 
1900. Bedside shift change report given to Octavio RN (oncoming nurse) by Raz RN (offgoing nurse). Report included the following information Nurse Handoff Report, Index, Intake/Output, MAR, Recent Results, and Cardiac Rhythm NSR .     2300. Bedside shift change report given to Fidel BECKWITH (oncoming nurse) by Octavio RN (offgoing nurse). Report included the following information Nurse Handoff Report, Index, Intake/Output, MAR, Recent Results, and Cardiac Rhythm NSR .     
Anticoagulation Dosing    Indication:  DVT ppx    Estimated Creatinine Clearance: 27 mL/min (A) (based on SCr of 1.65 mg/dL (H)).    Recent Labs     02/15/24  0549 02/16/24  0609 02/17/24  0326   HGB 11.6 11.8 11.5   HCT 34.2* 35.0 34.5*    237 237       Wt Readings from Last 1 Encounters:   02/17/24 76.8 kg (169 lb 5 oz)     Ht Readings from Last 1 Encounters:   02/15/24 1.62 m (5' 3.78\")     Body mass index is 29.26 kg/m².    Plan:  Lovenox adjusted to 30 mg sq q24h due to patient renal function and weight.       Thank you,  JAMESON REYES [facility]          
Bedside and Verbal shift change report given to Zayda (oncoming nurse) by Malika (offgoing nurse). Report included the following information Nurse Handoff Report, Index, MAR, Recent Results, and Cardiac Rhythm NSR .         End of Shift Note    Bedside shift change report given to  (oncoming nurse) by ZAYDA LOPES RN (offgoing nurse).  Report included the following information SBAR, Kardex, MAR, Recent Results, and Cardiac Rhythm NSR    Shift worked:  3a-7a     Shift summary and any significant changes:     Uneventful shift       Concerns for physician to address:       Zone phone for oncoming shift:            ZAYDA LOPES, RN                            
Cardiology Progress Note      2/17/2024 2:11 PM    Admit Date: 2/15/2024          Subjective:  Feeling better. Was able to ambulate without too much dizziness          BP (!) 154/55   Pulse 73   Temp 98 °F (36.7 °C) (Oral)   Resp 18   Ht 1.62 m (5' 3.78\")   Wt 76.8 kg (169 lb 5 oz)   SpO2 97%   BMI 29.26 kg/m²   02/15 1901 - 02/17 0700  In: 976 [P.O.:976]  Out: 2050 [Urine:2050]        Objective:      Physical Exam:  VS as above  Chest CTA  Card RRR no gallop     Data Review:   Labs:    Na 129  BUN 50  Creat 1.65   Hgb 11.5     Telemetry: SR       Assessment:          1) HTN: orthostatic Sx - improved   2) Bradycardia and symptomatic premature atrial beats previously  3) Dyslipidemia  4) GERD  5) HypoNa  6) CKD IIIb; Cr 1.3/gfr 40s.                Plan:  No additional cardiac recc. Cont current Rx. Will see back on request              
Nursing contacted Nocturnist/cross cover provider and notified patient lab result of na 124, up from 122, nephrology following hyponatremia, per dayshift hospitalist and nephrology notes reviewed likely 2/2 thiazide diuretics op top of siadh. Reported asymptomatic at this time. VSS. No other reported concerns at this time. Asked nurse to please obtain already ordered am labs around 0400. Patient denies any further complaints or concerns. No acute distress reported. Nursing to notify Hospitalist for further/continued concerns. Will remain available overnight for further concerns if nursing/patient needs. Will defer further evaluation/management to the day shift primary care team.    Non-billable note.    
PHYSICAL THERAPY EVALUATION/DISCHARGE    Patient: Katerin Biswas (83 y.o. female)  Date: 2/15/2024  Primary Diagnosis: Hyponatremia [E87.1]       Precautions: Fall Risk                      ASSESSMENT AND RECOMMENDATIONS:  Based on the objective data below, the patient was received for PT session seated in chair, agreeable to therapy. Seated /60. She performed sit <>Stand with Supervision, no device. Standing /51, pt asymptomatic. Seated rest break in chair for pt to adjust socks prior to gait. Upon returning to sitting upright pt reporting dizziness requiring 1min rest break to resolve. She returned to standing with Supervision, no device. She walked >250ft with SBA, no device. No buckling or major LOB noted, small path deviations but pt correcting without assist. Pt returned to room with BP 140s/60s standing at chair. She was left end of session up in chair, all needs met, call bell in reach and chair alarm on. B LE elevated for pt comfort. She is near her mobility baseline and has cleared acute care PT at this time. Recommend OP PT upon DC to address balance and endurance training. Will sign off.    Functional Outcome Measure:  The patient scored 22/24 on the [facility] outcome measure which is indicative of likely need of continued therapy at DC.          Further skilled acute physical therapy is not indicated at this time.       PLAN :  Recommendation for discharge: (in order for the patient to meet his/her long term goals): OP PT    Other factors to consider for discharge: lives alone    IF patient discharges home will need the following DME: none - pt has RW and education provided on use if she feels off balance or weaker days.        SUBJECTIVE:   Patient stated “I hope I can go home soon.”    OBJECTIVE DATA SUMMARY:     Past Medical History:   Diagnosis Date    Edema     Hypercholesterolemia     Hypertension     Insomnia, unspecified     Osteopenia     Prediabetes     Unspecified vitamin D 
              Consultant:          Comments   >50% of visit spent in counseling and coordination of care       ________________________________________________________________________  Dago Arechiga MD     Procedures: see electronic medical records for all procedures/Xrays and details which  were not copied into this note but were reviewed prior to creation of Plan.      LABS:  Recent Labs     02/16/24  0609 02/17/24  0326   WBC 6.8 7.2   HGB 11.8 11.5   HCT 35.0 34.5*    237       Recent Labs     02/15/24  0549 02/16/24  0609 02/16/24 2028 02/17/24  0326   * 122* 124* 129*   K 4.2 4.4  --  4.4   CL 92* 90*  --  95*   CO2 24 23  --  23   BUN 20 23*  --  50*   MG 1.9  --   --   --        No results for input(s): \"TP\", \"ALB\", \"GLOB\", \"GGT\", \"AML\" in the last 72 hours.    Invalid input(s): \"SGOT\", \"GPT\", \"AP\", \"TBIL\", \"AMYP\", \"LPSE\", \"HLPSE\"  No results for input(s): \"INR\", \"APTT\" in the last 72 hours.    Invalid input(s): \"PTP\"   No results for input(s): \"TIBC\", \"FERR\" in the last 72 hours.    Invalid input(s): \"FE\", \"PSAT\"   No results found for: \"FOL\", \"RBCF\"   No results for input(s): \"PH\", \"PCO2\", \"PO2\" in the last 72 hours.  No results for input(s): \"CPK\", \"CKMB\", \"TROPONINI\" in the last 72 hours.  No components found for: \"GLPOC\"  @labua@    MEDICATIONS:  Current Facility-Administered Medications   Medication Dose Route Frequency    losartan (COZAAR) tablet 25 mg  25 mg Oral Daily    urea (URE-NA) packet 15 g  15 g Oral BID    aspirin EC tablet 81 mg  81 mg Oral Daily    atorvastatin (LIPITOR) tablet 40 mg  40 mg Oral Daily    hydrOXYzine HCl (ATARAX) tablet 25 mg  25 mg Oral TID PRN    pantoprazole (PROTONIX) tablet 40 mg  40 mg Oral Daily    sertraline (ZOLOFT) tablet 25 mg  25 mg Oral Daily    Vitamin D (CHOLECALCIFEROL) tablet 2,000 Units  2,000 Units Oral Daily    sodium chloride flush 0.9 % injection 5-40 mL  5-40 mL IntraVENous 2 times per day    sodium chloride flush 0.9 % injection 5-40 mL  
Plan discussed with:  Patient Y    Family      RN     Care Manager                    Consultant:          Comments   >50% of visit spent in counseling and coordination of care       ________________________________________________________________________  Dago Arechiga MD     Procedures: see electronic medical records for all procedures/Xrays and details which  were not copied into this note but were reviewed prior to creation of Plan.      LABS:  Recent Labs     02/17/24  0326 02/18/24  0657   WBC 7.2 6.4   HGB 11.5 11.5   HCT 34.5* 34.6*    224       Recent Labs     02/16/24  0609 02/16/24 2028 02/17/24  0326 02/18/24  0657   * 124* 129* 131*   K 4.4  --  4.4 4.6   CL 90*  --  95* 100   CO2 23  --  23 27   BUN 23*  --  50* 65*   MG  --   --   --  2.3   PHOS  --   --   --  3.4       No results for input(s): \"TP\", \"ALB\", \"GLOB\", \"GGT\", \"AML\" in the last 72 hours.    Invalid input(s): \"SGOT\", \"GPT\", \"AP\", \"TBIL\", \"AMYP\", \"LPSE\", \"HLPSE\"  No results for input(s): \"INR\", \"APTT\" in the last 72 hours.    Invalid input(s): \"PTP\"   No results for input(s): \"TIBC\", \"FERR\" in the last 72 hours.    Invalid input(s): \"FE\", \"PSAT\"   No results found for: \"FOL\", \"RBCF\"   No results for input(s): \"PH\", \"PCO2\", \"PO2\" in the last 72 hours.  No results for input(s): \"CPK\", \"CKMB\", \"TROPONINI\" in the last 72 hours.  No components found for: \"GLPOC\"  @labua@    MEDICATIONS:  Current Facility-Administered Medications   Medication Dose Route Frequency    enoxaparin Sodium (LOVENOX) injection 30 mg  30 mg SubCUTAneous Daily    losartan (COZAAR) tablet 25 mg  25 mg Oral Daily    urea (URE-NA) packet 15 g  15 g Oral BID    aspirin EC tablet 81 mg  81 mg Oral Daily    atorvastatin (LIPITOR) tablet 40 mg  40 mg Oral Daily    hydrOXYzine HCl (ATARAX) tablet 25 mg  25 mg Oral TID PRN    pantoprazole (PROTONIX) tablet 40 mg  40 mg Oral Daily    sertraline (ZOLOFT) tablet 25 mg  25 mg Oral Daily    Vitamin D (CHOLECALCIFEROL) tablet 
call before getting OOB, stay with me (per policy), and gait belt       Length of Stay:  Expected LOS:   Actual LOS: 0      Mason Loera RN                            
(ZOFRAN) injection 4 mg  4 mg IntraVENous Q6H PRN    polyethylene glycol (GLYCOLAX) packet 17 g  17 g Oral Daily PRN    acetaminophen (TYLENOL) tablet 650 mg  650 mg Oral Q6H PRN    Or    acetaminophen (TYLENOL) suppository 650 mg  650 mg Rectal Q6H PRN      
pantoprazole (PROTONIX) tablet 40 mg  40 mg Oral Daily    sertraline (ZOLOFT) tablet 25 mg  25 mg Oral Daily    Vitamin D (CHOLECALCIFEROL) tablet 2,000 Units  2,000 Units Oral Daily    sodium chloride flush 0.9 % injection 5-40 mL  5-40 mL IntraVENous 2 times per day    sodium chloride flush 0.9 % injection 5-40 mL  5-40 mL IntraVENous PRN    0.9 % sodium chloride infusion   IntraVENous PRN    enoxaparin (LOVENOX) injection 40 mg  40 mg SubCUTAneous Daily    ondansetron (ZOFRAN-ODT) disintegrating tablet 4 mg  4 mg Oral Q8H PRN    Or    ondansetron (ZOFRAN) injection 4 mg  4 mg IntraVENous Q6H PRN    polyethylene glycol (GLYCOLAX) packet 17 g  17 g Oral Daily PRN    acetaminophen (TYLENOL) tablet 650 mg  650 mg Oral Q6H PRN    Or    acetaminophen (TYLENOL) suppository 650 mg  650 mg Rectal Q6H PRN        Roddy Garcia MD

## 2024-02-18 NOTE — DISCHARGE SUMMARY
Discharge Summary    Name: Katerin Biswas  [id number]  YOB: 1940 (Age: 83 y.o.)   Date of Admission: 2/15/2024  Date of Discharge: 2/18/2024  Attending Physician: Misael Romero MD    Discharge Diagnosis:   Hyponatremia acute on chronic   Labile blood pressure   CKD stage III  Depression  Anxiety  Dyslipidemia    Consultations:  IP CONSULT TO NEPHROLOGY  IP CONSULT TO CARDIOLOGY      Brief Admission History/Reason for Admission Per Johny Sharma, DO:     Katerin Biswas is a 83 y.o.  female with PMHx as listed below presenting to the emergency department with for evaluation of persistently high pressure associated with some lightheadedness.  Notes that she has been experiencing increasing lightheadedness on positional change.  Has been working with her PCP to control her blood pressure and was recently started on olmesartan-hydrochlorothiazide and taken off of metoprolol.  She was found to have hyponatremia to 123 in setting of chronic hyponatremia ranging from 130-135 which she reports has never been evaluated nephrologist and she has been started on sodium tabs by her PCP.  Additionally, while in ED patient found to have marked orthostatic events and had profound response to nicardipine infusion going from systolics of 190s to 120s on 2.5 infusion rate with reported syncopal event.       On arrival, patient remains hypertensive with systolics 190s/60s, saturating mid 90s on room air.  Denies symptomatology.     We were asked to admit for work up and evaluation of the above problems.           Brief Hospital Course by Main Problems:     Hyponatremia acute on chronic  -Most likely due to combination of hydrochlorothiazide and SIADH  -Sodium better   -Start fluid restriction.  Started on urea.  Renal following and appreciate recommendations.  Check BMP this evening.  -Discontinue hydrochlorothiazide at discharge     Labile blood pressure  -Patient noted to have

## 2024-02-18 NOTE — CARE COORDINATION
02/18/24 1546   Services At/After Discharge   Transition of Care Consult (CM Consult) N/A   Services At/After Discharge None   [place] Resource Information Provided? No   Mode of Transport at Discharge   (Pt's son will transport at d/c.)   Confirm Follow Up Transport Family   Condition of Participation: Discharge Planning   The Plan for Transition of Care is related to the following treatment goals: Home       No further CM needs identified. CM notified pt's nurse of d/c.    Veronica Baker MS  Weekend Care Manager-[facility]  [phone]

## 2024-02-23 ENCOUNTER — OFFICE VISIT (OUTPATIENT)
Age: 84
End: 2024-02-23

## 2024-02-23 VITALS
OXYGEN SATURATION: 96 % | HEIGHT: 64 IN | HEART RATE: 78 BPM | WEIGHT: 168 LBS | DIASTOLIC BLOOD PRESSURE: 80 MMHG | SYSTOLIC BLOOD PRESSURE: 220 MMHG | BODY MASS INDEX: 28.68 KG/M2 | RESPIRATION RATE: 17 BRPM | TEMPERATURE: 98.4 F

## 2024-02-23 DIAGNOSIS — E22.2 SIADH (SYNDROME OF INAPPROPRIATE ADH PRODUCTION) (HCC): ICD-10-CM

## 2024-02-23 DIAGNOSIS — N18.32 STAGE 3B CHRONIC KIDNEY DISEASE (HCC): ICD-10-CM

## 2024-02-23 DIAGNOSIS — F41.9 ANXIETY: ICD-10-CM

## 2024-02-23 DIAGNOSIS — I10 PRIMARY HYPERTENSION: ICD-10-CM

## 2024-02-23 DIAGNOSIS — Z09 HOSPITAL DISCHARGE FOLLOW-UP: Primary | ICD-10-CM

## 2024-02-23 LAB
ANION GAP SERPL CALC-SCNC: 6 MMOL/L (ref 5–15)
BUN SERPL-MCNC: 18 MG/DL (ref 6–20)
BUN/CREAT SERPL: 14 (ref 12–20)
CALCIUM SERPL-MCNC: 10 MG/DL (ref 8.5–10.1)
CHLORIDE SERPL-SCNC: 103 MMOL/L (ref 97–108)
CO2 SERPL-SCNC: 26 MMOL/L (ref 21–32)
CREAT SERPL-MCNC: 1.32 MG/DL (ref 0.55–1.02)
GLUCOSE SERPL-MCNC: 102 MG/DL (ref 65–100)
POTASSIUM SERPL-SCNC: 4.5 MMOL/L (ref 3.5–5.1)
SODIUM SERPL-SCNC: 135 MMOL/L (ref 136–145)

## 2024-02-23 RX ORDER — CLONAZEPAM 0.5 MG/1
TABLET ORAL
Qty: 15 TABLET | Refills: 0 | Status: SHIPPED | OUTPATIENT
Start: 2024-02-23 | End: 2024-03-23

## 2024-02-23 RX ORDER — LOSARTAN POTASSIUM 25 MG/1
25 TABLET ORAL 2 TIMES DAILY
Qty: 30 TABLET | Refills: 0
Start: 2024-02-23

## 2024-02-23 ASSESSMENT — PATIENT HEALTH QUESTIONNAIRE - PHQ9
1. LITTLE INTEREST OR PLEASURE IN DOING THINGS: 0
SUM OF ALL RESPONSES TO PHQ QUESTIONS 1-9: 0
SUM OF ALL RESPONSES TO PHQ9 QUESTIONS 1 & 2: 0
SUM OF ALL RESPONSES TO PHQ QUESTIONS 1-9: 0
2. FEELING DOWN, DEPRESSED OR HOPELESS: 0

## 2024-02-23 NOTE — PROGRESS NOTES
Chief Complaint   Patient presents with    Follow-Up from Hospital       ASSESSMENT AND PLAN:      Diagnosis Orders   1. Hospital discharge follow-up        2. Primary hypertension  losartan (COZAAR) 25 MG tablet    [place] COLLECTION VENOUS BLOOD VENIPUNCTURE    Basic Metabolic Panel    Basic Metabolic Panel      3. Anxiety  clonazePAM (KLONOPIN) 0.5 MG tablet      4. Stage 3b chronic kidney disease (HCC)          Reviewed labs, notes from the hospital. Meds reconciled. Increase losartan to 25 mg BID and monitor BP BID as they've been doing. Discussed anxiety as a contribution to her labile BP. She just started sertraline daily. Reviewed MOA, must be taken daily to work and may take 7-10 days to notice a difference. Rx low dose clonazepam to use sparingly, ok to take 1/2-1 tablet. The first time she takes this, I'd like her to be at her daughter in law's house. Check labs today, will send to Dr. Lowery for upcoming appt.    Patient aware of plan of care and verbalized understanding. Questions answered. RTC 1 week, or sooner if needed.    HPI:     is a 83 y.o. female in today for a hospital follow up.    Anxiety/depression - On sertraline, taking it daily since hospital d/c.     Hyponatremia - Brief hyponatremia in 2019, resolved with med adjustments, sodium tablets. Recurred Feb 2024 after starting thiazide diuretic.      Hypertension - See below. Released from Dr. Samson's care.     Venous insufficiency - follows with vascular through [facility]. Leaky valves, stable.     New issues: Hospitalized at [facility] 2/15-2/18 for labile HTN, hyponatremia. Off of metoprolol, olmesartan-hctz and on losartan 25 mg. She had wide variations in BP during the inpatient setting, difficult to control. Sodium was down to 123 on admission, was normal in January at 138. Renal function up during hospital stay, Cr 1.25-->1.6. She will see nephrology as an outpatient, Dr. ASTRID Arechiga.     She's felt okay since she's come home, no more \"giddy headed\"

## 2024-02-23 NOTE — PROGRESS NOTES
\"Have you been to the ER, urgent care clinic since your last visit?  Hospitalized since your last visit?\"    YES [facility]    “Have you seen or consulted any other health care providers outside of [facility] since your last visit?”    NO

## 2024-02-23 NOTE — PATIENT INSTRUCTIONS
Increase losartan 25 mg to twice a day dosing. You will take 25 mg in the morning and 25 mg in the evening. I want you to watch for dizziness, instability.

## 2024-03-01 ENCOUNTER — OFFICE VISIT (OUTPATIENT)
Age: 84
End: 2024-03-01
Payer: MEDICARE

## 2024-03-01 VITALS
SYSTOLIC BLOOD PRESSURE: 190 MMHG | HEART RATE: 71 BPM | DIASTOLIC BLOOD PRESSURE: 76 MMHG | WEIGHT: 173.2 LBS | BODY MASS INDEX: 29.57 KG/M2 | OXYGEN SATURATION: 98 % | RESPIRATION RATE: 16 BRPM | HEIGHT: 64 IN | TEMPERATURE: 98.3 F

## 2024-03-01 DIAGNOSIS — E22.2 SIADH (SYNDROME OF INAPPROPRIATE ADH PRODUCTION) (HCC): ICD-10-CM

## 2024-03-01 DIAGNOSIS — F41.9 ANXIETY: ICD-10-CM

## 2024-03-01 DIAGNOSIS — F41.8 ANXIETY ABOUT HEALTH: ICD-10-CM

## 2024-03-01 DIAGNOSIS — I10 PRIMARY HYPERTENSION: Primary | ICD-10-CM

## 2024-03-01 PROCEDURE — 1090F PRES/ABSN URINE INCON ASSESS: CPT | Performed by: NURSE PRACTITIONER

## 2024-03-01 PROCEDURE — G8399 PT W/DXA RESULTS DOCUMENT: HCPCS | Performed by: NURSE PRACTITIONER

## 2024-03-01 PROCEDURE — 1123F ACP DISCUSS/DSCN MKR DOCD: CPT | Performed by: NURSE PRACTITIONER

## 2024-03-01 PROCEDURE — G8484 FLU IMMUNIZE NO ADMIN: HCPCS | Performed by: NURSE PRACTITIONER

## 2024-03-01 PROCEDURE — 3077F SYST BP >= 140 MM HG: CPT | Performed by: NURSE PRACTITIONER

## 2024-03-01 PROCEDURE — 1036F TOBACCO NON-USER: CPT | Performed by: NURSE PRACTITIONER

## 2024-03-01 PROCEDURE — 3078F DIAST BP <80 MM HG: CPT | Performed by: NURSE PRACTITIONER

## 2024-03-01 PROCEDURE — G8427 DOCREV CUR MEDS BY ELIG CLIN: HCPCS | Performed by: NURSE PRACTITIONER

## 2024-03-01 PROCEDURE — 1111F DSCHRG MED/CURRENT MED MERGE: CPT | Performed by: NURSE PRACTITIONER

## 2024-03-01 PROCEDURE — G8417 CALC BMI ABV UP PARAM F/U: HCPCS | Performed by: NURSE PRACTITIONER

## 2024-03-01 PROCEDURE — 99214 OFFICE O/P EST MOD 30 MIN: CPT | Performed by: NURSE PRACTITIONER

## 2024-03-01 RX ORDER — CLONAZEPAM 0.5 MG/1
TABLET ORAL
Qty: 15 TABLET | Refills: 0 | Status: SHIPPED | OUTPATIENT
Start: 2024-03-01 | End: 2024-03-30

## 2024-03-01 RX ORDER — LOSARTAN POTASSIUM 50 MG/1
50 TABLET ORAL 2 TIMES DAILY
COMMUNITY
Start: 2024-02-29

## 2024-03-01 ASSESSMENT — PATIENT HEALTH QUESTIONNAIRE - PHQ9
SUM OF ALL RESPONSES TO PHQ9 QUESTIONS 1 & 2: 0
SUM OF ALL RESPONSES TO PHQ QUESTIONS 1-9: 0
1. LITTLE INTEREST OR PLEASURE IN DOING THINGS: 0
SUM OF ALL RESPONSES TO PHQ QUESTIONS 1-9: 0
2. FEELING DOWN, DEPRESSED OR HOPELESS: 0
SUM OF ALL RESPONSES TO PHQ QUESTIONS 1-9: 0
SUM OF ALL RESPONSES TO PHQ QUESTIONS 1-9: 0

## 2024-03-01 NOTE — PROGRESS NOTES
\"Have you been to the ER, urgent care clinic since your last visit?  Hospitalized since your last visit?\"    NO    “Have you seen or consulted any other health care providers outside of [facility] since your last visit?”    NO           
Provider   losartan (COZAAR) 50 MG tablet Take 1 tablet by mouth in the morning and at bedtime Yes ProviderGreg MD   clonazePAM (KLONOPIN) 0.5 MG tablet 1/2-1 tab PO QHS PRN Yes Rosalina Flores APRN - NP   sertraline (ZOLOFT) 50 MG tablet Take 1 tablet by mouth daily Yes Rosalina Flores APRN - NP   atorvastatin (LIPITOR) 40 MG tablet TAKE 1 TABLET BY MOUTH DAILY Yes Rosalina Flores APRN - NP   polyethylene glycol (GLYCOLAX) 17 GM/SCOOP powder Take 17 g by mouth daily Yes ProviderGreg MD   aspirin 81 MG EC tablet Take by mouth daily Yes Automatic Reconciliation, Ar   vitamin D 25 MCG (1000 UT) CAPS Take 2 capsules by mouth daily Yes Automatic Reconciliation, Ar   pantoprazole (PROTONIX) 40 MG tablet Take 1 tablet by mouth daily Yes Automatic Reconciliation, Ar       Past Medical History:   Diagnosis Date    Edema     Hypercholesterolemia     Hypertension     Insomnia, unspecified     Osteopenia     Prediabetes     Unspecified vitamin D deficiency        Family History   Problem Relation Age of Onset    Hypertension Mother     Hypertension Father     Diabetes Brother     Diabetes Sister     Kidney Disease Father     Diabetes Mother          Review of Systems    A comprehensive review of systems was negative except for that written in the HPI.    Patient is not experiencing chest pain radiating to the jaw and/or down the arms.    Physical Examination:    Vitals:    03/01/24 1009 03/01/24 1012   BP: (!) 190/76 (!) 190/76   Site:  Left Upper Arm   Position:  Sitting   Cuff Size:  Medium Adult   Pulse:  71   Resp: 16 16   Temp:  98.3 °F (36.8 °C)   TempSrc:  Oral   SpO2: 98% 98%   Weight: 78.6 kg (173 lb 3.2 oz) 78.6 kg (173 lb 3.2 oz)   Height: 1.62 m (5' 3.78\") 1.62 m (5' 3.78\")          Body mass index is 29.94 kg/m².     Wt Readings from Last 3 Encounters:   03/01/24 78.6 kg (173 lb 3.2 oz)   02/23/24 76.2 kg (168 lb)   02/17/24 76.8 kg (169 lb 5 oz)       Constitutional: WDWN

## 2024-05-01 RX ORDER — LOSARTAN POTASSIUM 50 MG/1
50 TABLET ORAL 2 TIMES DAILY
Qty: 180 TABLET | Refills: 1 | Status: SHIPPED | OUTPATIENT
Start: 2024-05-01

## 2024-05-01 NOTE — TELEPHONE ENCOUNTER
Medication Refill Request    Katerin Biswas is requesting a refill of the following medication(s):   losartan (COZAAR) 50 MG tablet   Please send refill to:     [facility] - [place], [place] - [address] -  [phone] - F [phone]  [address] [address]  Phone: [phone] Fax: [phone]

## 2024-05-01 NOTE — TELEPHONE ENCOUNTER
Patient requesting refill on     Requested Prescriptions     Pending Prescriptions Disp Refills    losartan (COZAAR) 50 MG tablet 90 tablet 1     Sig: Take 1 tablet by mouth in the morning and at bedtime        Last OV 3/1/2024

## 2024-05-13 DIAGNOSIS — R45.89 ANXIETY ABOUT HEALTH: ICD-10-CM

## 2024-05-13 NOTE — TELEPHONE ENCOUNTER
Katerin states that she didn't know that the RX for sertraline was called in 3-1. She never picked up the medication. Katerin wants to know if another RX can be called in.    Medication Refill Request    Katerin Biswas is requesting a refill of the following medication(s):   sertraline (ZOLOFT) 50 MG tablet   Please send refill to:       [facility] [address] [place] [address] [address] [facility] - P [phone] - F [phone]  [address] [place] [id number]  Phone: [phone] Fax: [phone]

## 2024-05-31 ENCOUNTER — OFFICE VISIT (OUTPATIENT)
Age: 84
End: 2024-05-31
Payer: MEDICARE

## 2024-05-31 VITALS
HEART RATE: 64 BPM | TEMPERATURE: 98 F | DIASTOLIC BLOOD PRESSURE: 90 MMHG | SYSTOLIC BLOOD PRESSURE: 210 MMHG | RESPIRATION RATE: 17 BRPM | WEIGHT: 169.6 LBS | HEIGHT: 64 IN | OXYGEN SATURATION: 97 % | BODY MASS INDEX: 28.95 KG/M2

## 2024-05-31 DIAGNOSIS — I10 PRIMARY HYPERTENSION: ICD-10-CM

## 2024-05-31 DIAGNOSIS — E78.2 MIXED HYPERLIPIDEMIA: ICD-10-CM

## 2024-05-31 DIAGNOSIS — N18.32 STAGE 3B CHRONIC KIDNEY DISEASE (HCC): ICD-10-CM

## 2024-05-31 DIAGNOSIS — E22.2 SIADH (SYNDROME OF INAPPROPRIATE ADH PRODUCTION) (HCC): Primary | ICD-10-CM

## 2024-05-31 DIAGNOSIS — R73.03 PREDIABETES: ICD-10-CM

## 2024-05-31 LAB
COMMENT:: NORMAL
SPECIMEN HOLD: NORMAL

## 2024-05-31 PROCEDURE — 1090F PRES/ABSN URINE INCON ASSESS: CPT | Performed by: NURSE PRACTITIONER

## 2024-05-31 PROCEDURE — 1123F ACP DISCUSS/DSCN MKR DOCD: CPT | Performed by: NURSE PRACTITIONER

## 2024-05-31 PROCEDURE — G8399 PT W/DXA RESULTS DOCUMENT: HCPCS | Performed by: NURSE PRACTITIONER

## 2024-05-31 PROCEDURE — G8427 DOCREV CUR MEDS BY ELIG CLIN: HCPCS | Performed by: NURSE PRACTITIONER

## 2024-05-31 PROCEDURE — 1036F TOBACCO NON-USER: CPT | Performed by: NURSE PRACTITIONER

## 2024-05-31 PROCEDURE — 3077F SYST BP >= 140 MM HG: CPT | Performed by: NURSE PRACTITIONER

## 2024-05-31 PROCEDURE — 36415 COLL VENOUS BLD VENIPUNCTURE: CPT | Performed by: NURSE PRACTITIONER

## 2024-05-31 PROCEDURE — G8417 CALC BMI ABV UP PARAM F/U: HCPCS | Performed by: NURSE PRACTITIONER

## 2024-05-31 PROCEDURE — 3079F DIAST BP 80-89 MM HG: CPT | Performed by: NURSE PRACTITIONER

## 2024-05-31 PROCEDURE — 99214 OFFICE O/P EST MOD 30 MIN: CPT | Performed by: NURSE PRACTITIONER

## 2024-05-31 RX ORDER — NIFEDIPINE 30 MG/1
30 TABLET, EXTENDED RELEASE ORAL NIGHTLY
Qty: 30 TABLET | Refills: 0 | Status: SHIPPED | OUTPATIENT
Start: 2024-05-31

## 2024-05-31 ASSESSMENT — PATIENT HEALTH QUESTIONNAIRE - PHQ9
2. FEELING DOWN, DEPRESSED OR HOPELESS: NOT AT ALL
SUM OF ALL RESPONSES TO PHQ QUESTIONS 1-9: 0
SUM OF ALL RESPONSES TO PHQ9 QUESTIONS 1 & 2: 0
SUM OF ALL RESPONSES TO PHQ QUESTIONS 1-9: 0
1. LITTLE INTEREST OR PLEASURE IN DOING THINGS: NOT AT ALL
SUM OF ALL RESPONSES TO PHQ QUESTIONS 1-9: 0
SUM OF ALL RESPONSES TO PHQ QUESTIONS 1-9: 0

## 2024-05-31 NOTE — PROGRESS NOTES
Chief Complaint   Patient presents with    3 Month Follow-Up       ASSESSMENT AND PLAN:      Diagnosis Orders   1. SIADH (syndrome of inappropriate ADH production) (HCC)        2. Stage 3b chronic kidney disease (HCC)  TX COLLECTION VENOUS BLOOD VENIPUNCTURE    CBC with Auto Differential    Comprehensive Metabolic Panel    Vitamin D 25 Hydroxy    Magnesium    PTH, Intact    Microalbumin / Creatinine Urine Ratio    Microalbumin / Creatinine Urine Ratio    PTH, Intact    Magnesium    Vitamin D 25 Hydroxy    Comprehensive Metabolic Panel    CBC with Auto Differential      3. Mixed hyperlipidemia  TX COLLECTION VENOUS BLOOD VENIPUNCTURE    Lipid Panel    Lipid Panel      4. Prediabetes  TX COLLECTION VENOUS BLOOD VENIPUNCTURE    Hemoglobin A1C    Hemoglobin A1C      5. Primary hypertension  TX COLLECTION VENOUS BLOOD VENIPUNCTURE    CBC with Auto Differential    Comprehensive Metabolic Panel    Hemoglobin A1C    Vitamin D 25 Hydroxy    TSH    Magnesium    PTH, Intact    Microalbumin / Creatinine Urine Ratio    Lipid Panel    Lipid Panel    Microalbumin / Creatinine Urine Ratio    PTH, Intact    Magnesium    TSH    Vitamin D 25 Hydroxy    Hemoglobin A1C    Comprehensive Metabolic Panel    CBC with Auto Differential          Home BP log with average BP 150s systolic. Add nifedipine QHS. Start low at 30 mg. Continue losartan BID. She has a hx of sensitivity to BP medications across the classes so I want her to check in with me in 2-3 weeks in person or via phone with updated readings and how she is feeling. Check up labs drawn today, will send these to Dr. Lowery.     Patient aware of plan of care and verbalized understanding. Questions answered. RTC October, or sooner if needed.    HPI:     is a 83 y.o. female in today for follow up.    Anxiety/depression - On sertraline, taking it daily since hospital d/c.     Hyponatremia - Brief hyponatremia in 2019, resolved with med adjustments, sodium tablets. Recurred Feb

## 2024-06-01 LAB
25(OH)D3 SERPL-MCNC: 55.5 NG/ML (ref 30–100)
ALBUMIN SERPL-MCNC: 4.1 G/DL (ref 3.5–5)
ALBUMIN/GLOB SERPL: 1.2 (ref 1.1–2.2)
ALP SERPL-CCNC: 93 U/L (ref 45–117)
ALT SERPL-CCNC: 28 U/L (ref 12–78)
ANION GAP SERPL CALC-SCNC: 4 MMOL/L (ref 5–15)
AST SERPL-CCNC: 22 U/L (ref 15–37)
BASOPHILS # BLD: 0 K/UL (ref 0–0.1)
BASOPHILS NFR BLD: 1 % (ref 0–1)
BILIRUB SERPL-MCNC: 0.6 MG/DL (ref 0.2–1)
BUN SERPL-MCNC: 18 MG/DL (ref 6–20)
BUN/CREAT SERPL: 14 (ref 12–20)
CALCIUM SERPL-MCNC: 10.2 MG/DL (ref 8.5–10.1)
CALCIUM SERPL-MCNC: 9.6 MG/DL (ref 8.5–10.1)
CHLORIDE SERPL-SCNC: 99 MMOL/L (ref 97–108)
CHOLEST SERPL-MCNC: 123 MG/DL
CO2 SERPL-SCNC: 26 MMOL/L (ref 21–32)
CREAT SERPL-MCNC: 1.32 MG/DL (ref 0.55–1.02)
CREAT UR-MCNC: 79.5 MG/DL
DIFFERENTIAL METHOD BLD: ABNORMAL
EOSINOPHIL # BLD: 0.3 K/UL (ref 0–0.4)
EOSINOPHIL NFR BLD: 5 % (ref 0–7)
ERYTHROCYTE [DISTWIDTH] IN BLOOD BY AUTOMATED COUNT: 13.2 % (ref 11.5–14.5)
EST. AVERAGE GLUCOSE BLD GHB EST-MCNC: 120 MG/DL
GLOBULIN SER CALC-MCNC: 3.4 G/DL (ref 2–4)
GLUCOSE SERPL-MCNC: 98 MG/DL (ref 65–100)
HBA1C MFR BLD: 5.8 % (ref 4–5.6)
HCT VFR BLD AUTO: 34.8 % (ref 35–47)
HDLC SERPL-MCNC: 41 MG/DL
HDLC SERPL: 3 (ref 0–5)
HGB BLD-MCNC: 11.4 G/DL (ref 11.5–16)
IMM GRANULOCYTES # BLD AUTO: 0 K/UL (ref 0–0.04)
IMM GRANULOCYTES NFR BLD AUTO: 0 % (ref 0–0.5)
LDLC SERPL CALC-MCNC: 44.8 MG/DL (ref 0–100)
LYMPHOCYTES # BLD: 1.8 K/UL (ref 0.8–3.5)
LYMPHOCYTES NFR BLD: 24 % (ref 12–49)
MAGNESIUM SERPL-MCNC: 2 MG/DL (ref 1.6–2.4)
MCH RBC QN AUTO: 29.1 PG (ref 26–34)
MCHC RBC AUTO-ENTMCNC: 32.8 G/DL (ref 30–36.5)
MCV RBC AUTO: 88.8 FL (ref 80–99)
MICROALBUMIN UR-MCNC: 4.01 MG/DL
MICROALBUMIN/CREAT UR-RTO: 50 MG/G (ref 0–30)
MONOCYTES # BLD: 0.6 K/UL (ref 0–1)
MONOCYTES NFR BLD: 8 % (ref 5–13)
NEUTS SEG # BLD: 4.6 K/UL (ref 1.8–8)
NEUTS SEG NFR BLD: 62 % (ref 32–75)
NRBC # BLD: 0 K/UL (ref 0–0.01)
NRBC BLD-RTO: 0 PER 100 WBC
PLATELET # BLD AUTO: 232 K/UL (ref 150–400)
PMV BLD AUTO: 9.2 FL (ref 8.9–12.9)
POTASSIUM SERPL-SCNC: 5.4 MMOL/L (ref 3.5–5.1)
PROT SERPL-MCNC: 7.5 G/DL (ref 6.4–8.2)
PTH-INTACT SERPL-MCNC: 62.4 PG/ML (ref 18.4–88)
RBC # BLD AUTO: 3.92 M/UL (ref 3.8–5.2)
SODIUM SERPL-SCNC: 129 MMOL/L (ref 136–145)
TRIGL SERPL-MCNC: 186 MG/DL
TSH SERPL DL<=0.05 MIU/L-ACNC: 2.65 UIU/ML (ref 0.36–3.74)
VLDLC SERPL CALC-MCNC: 37.2 MG/DL
WBC # BLD AUTO: 7.3 K/UL (ref 3.6–11)

## 2024-06-21 ENCOUNTER — OFFICE VISIT (OUTPATIENT)
Age: 84
End: 2024-06-21

## 2024-06-21 VITALS
HEIGHT: 64 IN | BODY MASS INDEX: 28.89 KG/M2 | TEMPERATURE: 97.4 F | HEART RATE: 75 BPM | OXYGEN SATURATION: 97 % | RESPIRATION RATE: 17 BRPM | DIASTOLIC BLOOD PRESSURE: 70 MMHG | WEIGHT: 169.2 LBS | SYSTOLIC BLOOD PRESSURE: 180 MMHG

## 2024-06-21 DIAGNOSIS — N18.32 STAGE 3B CHRONIC KIDNEY DISEASE (HCC): ICD-10-CM

## 2024-06-21 DIAGNOSIS — I10 PRIMARY HYPERTENSION: ICD-10-CM

## 2024-06-21 DIAGNOSIS — E87.5 HYPERKALEMIA: ICD-10-CM

## 2024-06-21 DIAGNOSIS — E22.2 SIADH (SYNDROME OF INAPPROPRIATE ADH PRODUCTION) (HCC): Primary | ICD-10-CM

## 2024-06-21 ASSESSMENT — PATIENT HEALTH QUESTIONNAIRE - PHQ9
SUM OF ALL RESPONSES TO PHQ QUESTIONS 1-9: 0
SUM OF ALL RESPONSES TO PHQ9 QUESTIONS 1 & 2: 0
2. FEELING DOWN, DEPRESSED OR HOPELESS: NOT AT ALL
SUM OF ALL RESPONSES TO PHQ QUESTIONS 1-9: 0

## 2024-06-21 NOTE — PROGRESS NOTES
Chief Complaint   Patient presents with    Other     labs       ASSESSMENT AND PLAN:      Diagnosis Orders   1. SIADH (syndrome of inappropriate ADH production) ([facility])        2. Stage 3b chronic kidney disease (HCC)        3. Primary hypertension  UT COLLECTION VENOUS BLOOD VENIPUNCTURE    Basic Metabolic Panel      4. Hyperkalemia  UT COLLECTION VENOUS BLOOD VENIPUNCTURE    Basic Metabolic Panel          BP log is excellent but she's cutting her nifedipine in half. Retry a whole dose and back it up to dinner time. If she feels poorly, resume cutting it in half. Check K+ today. Reviewed notes from Dr. Lowery, pt to start Ure-NA packets.     Patient aware of plan of care and verbalized understanding. Questions answered. RTC October, or sooner if needed.    HPI:     is a 84 y.o. female in today for follow up.    Anxiety/depression - On sertraline, taking it daily since hospital d/c.     Hyponatremia - Brief hyponatremia in 2019, resolved with med adjustments, sodium tablets. Recurred Feb 2024 after starting thiazide diuretic. Follows with Dr. Lowery, CKD stage IIIb.     Hypertension - See below. Released from Dr. Samson's care.     Venous insufficiency - follows with vascular through [facility]. Leaky valves, stable.     New issues: Home BP is much improved! She's cutting her nifedipine in half. She feels well.    Allergies   Allergen Reactions    Sulfa Antibiotics Nausea Only and Other (See Comments)     Blisters On  Lip     Betamethasone      Other reaction(s): red face    Cortisone Other (See Comments)     Hot flash, flush    Etodolac      Other reaction(s): GI upset    Ciprofloxacin Hives    Lisinopril Other (See Comments)     Felt bad    Egg Shells      Allergy testing done showing sensitivity to eggs-pt eats eggs on a regular basis without any reactions       Prior to Visit Medications    Medication Sig Taking? Authorizing Provider   NIFEdipine (PROCARDIA XL) 30 MG extended release tablet Take 1 tablet by mouth at

## 2024-06-22 LAB
ANION GAP SERPL CALC-SCNC: 5 MMOL/L (ref 5–15)
BUN SERPL-MCNC: 21 MG/DL (ref 6–20)
BUN/CREAT SERPL: 16 (ref 12–20)
CALCIUM SERPL-MCNC: 9.5 MG/DL (ref 8.5–10.1)
CHLORIDE SERPL-SCNC: 101 MMOL/L (ref 97–108)
CO2 SERPL-SCNC: 26 MMOL/L (ref 21–32)
CREAT SERPL-MCNC: 1.33 MG/DL (ref 0.55–1.02)
GLUCOSE SERPL-MCNC: 99 MG/DL (ref 65–100)
POTASSIUM SERPL-SCNC: 5.5 MMOL/L (ref 3.5–5.1)
SODIUM SERPL-SCNC: 132 MMOL/L (ref 136–145)

## 2024-07-01 NOTE — TELEPHONE ENCOUNTER
Medication Refill Request    Katerin Biswas is requesting a refill of the following medication(s):    pt is back to taking whole pill now fyi    NIFEdipine (PROCARDIA XL) 30 MG extended release tablet     Please send refill to:       [id number] - [place], [place] - [address] [facility] -  [phone] - F [phone] [address] [facility] [address]  Phone: [phone] Fax: [phone]

## 2024-07-02 RX ORDER — NIFEDIPINE 30 MG/1
30 TABLET, EXTENDED RELEASE ORAL NIGHTLY
Qty: 90 TABLET | Refills: 3 | Status: SHIPPED | OUTPATIENT
Start: 2024-07-02

## 2024-08-09 ENCOUNTER — OFFICE VISIT (OUTPATIENT)
Age: 84
End: 2024-08-09
Payer: MEDICARE

## 2024-08-09 VITALS
DIASTOLIC BLOOD PRESSURE: 80 MMHG | RESPIRATION RATE: 18 BRPM | SYSTOLIC BLOOD PRESSURE: 178 MMHG | BODY MASS INDEX: 29.41 KG/M2 | OXYGEN SATURATION: 99 % | HEART RATE: 68 BPM | TEMPERATURE: 97.3 F | WEIGHT: 170 LBS

## 2024-08-09 DIAGNOSIS — I10 PRIMARY HYPERTENSION: ICD-10-CM

## 2024-08-09 DIAGNOSIS — E87.5 HYPERKALEMIA: ICD-10-CM

## 2024-08-09 DIAGNOSIS — J06.9 VIRAL URI: Primary | ICD-10-CM

## 2024-08-09 DIAGNOSIS — E22.2 SIADH (SYNDROME OF INAPPROPRIATE ADH PRODUCTION) (HCC): ICD-10-CM

## 2024-08-09 PROCEDURE — 1090F PRES/ABSN URINE INCON ASSESS: CPT | Performed by: NURSE PRACTITIONER

## 2024-08-09 PROCEDURE — 1036F TOBACCO NON-USER: CPT | Performed by: NURSE PRACTITIONER

## 2024-08-09 PROCEDURE — 3077F SYST BP >= 140 MM HG: CPT | Performed by: NURSE PRACTITIONER

## 2024-08-09 PROCEDURE — 3079F DIAST BP 80-89 MM HG: CPT | Performed by: NURSE PRACTITIONER

## 2024-08-09 PROCEDURE — 1123F ACP DISCUSS/DSCN MKR DOCD: CPT | Performed by: NURSE PRACTITIONER

## 2024-08-09 PROCEDURE — G8417 CALC BMI ABV UP PARAM F/U: HCPCS | Performed by: NURSE PRACTITIONER

## 2024-08-09 PROCEDURE — 99214 OFFICE O/P EST MOD 30 MIN: CPT | Performed by: NURSE PRACTITIONER

## 2024-08-09 PROCEDURE — G8427 DOCREV CUR MEDS BY ELIG CLIN: HCPCS | Performed by: NURSE PRACTITIONER

## 2024-08-09 PROCEDURE — G8399 PT W/DXA RESULTS DOCUMENT: HCPCS | Performed by: NURSE PRACTITIONER

## 2024-08-09 PROCEDURE — 36415 COLL VENOUS BLD VENIPUNCTURE: CPT | Performed by: NURSE PRACTITIONER

## 2024-08-09 NOTE — PROGRESS NOTES
\"Have you been to the ER, urgent care clinic since your last visit?  Hospitalized since your last visit?\"    NO    “Have you seen or consulted any other health care providers outside of [facility] since your last visit?”    NO    Chief Complaint   Patient presents with    Follow-up     Sinus congestion/pressure x 3 days    Otalgia     Left started this morning        Vitals:    08/09/24 1002   BP: (!) 178/80   Pulse: 68   Resp: 18   Temp: 97.3 °F (36.3 °C)   SpO2: 99%           Click Here for Release of Records Request

## 2024-08-09 NOTE — PROGRESS NOTES
Chief Complaint   Patient presents with    Follow-up     Sinus congestion/pressure x 3 days    Otalgia     Left started this morning        ASSESSMENT AND PLAN:      Diagnosis Orders   1. Viral URI        2. Primary hypertension        3. SIADH (syndrome of inappropriate ADH production) ([facility])  [place] COLLECTION VENOUS BLOOD VENIPUNCTURE    Comprehensive Metabolic Panel      4. Hyperkalemia  PA COLLECTION VENOUS BLOOD VENIPUNCTURE    Comprehensive Metabolic Panel        Discussed BP, continue current regimen and re-check K+ and Na. Discussed URI. Recommended continued antihistamine, ibuprofen through the weekend. Take a home COVID test and report the results via telephone.    Patient aware of plan of care and verbalized understanding. Questions answered. RTC as scheduled, or sooner if needed.    HPI:     is a 84 y.o. female in today for follow up.    Anxiety/depression - On sertraline, taking it daily since hospital d/c.     Hyponatremia - Brief hyponatremia in 2019, resolved with med adjustments, sodium tablets. Recurred Feb 2024 after starting thiazide diuretic. Follows with Dr. Lowery, CKD stage IIIb.     Hypertension - See below. Released from Dr. Samson's care.     Venous insufficiency - follows with vascular through [facility]. Leaky valves, stable.     New issues: She did not get the message to decrease her losartan to once a day due to hyperkalemia 6 weeks ago. She's maintained on BID dosing as well as a full tab of nifedipine. Home BP excellent, see scanned log. She reports R sided ear pain and sinus congestion. Ear pain started today, sinus issues started three days ago. She is on an OTC antihistamine.    Allergies   Allergen Reactions    Sulfa Antibiotics Nausea Only and Other (See Comments)     Blisters On  Lip     Betamethasone      Other reaction(s): red face    Cortisone Other (See Comments)     Hot flash, flush    Etodolac      Other reaction(s): GI upset    Ciprofloxacin Hives    Lisinopril Other (See

## 2024-08-09 NOTE — PROGRESS NOTES
\"Have you been to the ER, urgent care clinic since your last visit?  Hospitalized since your last visit?\"    NO    “Have you seen or consulted any other health care providers outside of [facility] since your last visit?”    NO            Click Here for Release of Records Request

## 2024-08-10 LAB
ALBUMIN SERPL-MCNC: 4.2 G/DL (ref 3.5–5)
ALBUMIN/GLOB SERPL: 1.3 (ref 1.1–2.2)
ALP SERPL-CCNC: 91 U/L (ref 45–117)
ALT SERPL-CCNC: 30 U/L (ref 12–78)
ANION GAP SERPL CALC-SCNC: 7 MMOL/L (ref 5–15)
AST SERPL-CCNC: 20 U/L (ref 15–37)
BILIRUB SERPL-MCNC: 0.5 MG/DL (ref 0.2–1)
BUN SERPL-MCNC: 24 MG/DL (ref 6–20)
BUN/CREAT SERPL: 14 (ref 12–20)
CALCIUM SERPL-MCNC: 9.4 MG/DL (ref 8.5–10.1)
CHLORIDE SERPL-SCNC: 100 MMOL/L (ref 97–108)
CO2 SERPL-SCNC: 27 MMOL/L (ref 21–32)
CREAT SERPL-MCNC: 1.67 MG/DL (ref 0.55–1.02)
GLOBULIN SER CALC-MCNC: 3.3 G/DL (ref 2–4)
GLUCOSE SERPL-MCNC: 82 MG/DL (ref 65–100)
POTASSIUM SERPL-SCNC: 5.2 MMOL/L (ref 3.5–5.1)
PROT SERPL-MCNC: 7.5 G/DL (ref 6.4–8.2)
SODIUM SERPL-SCNC: 134 MMOL/L (ref 136–145)

## 2024-09-03 RX ORDER — ATORVASTATIN CALCIUM 40 MG/1
TABLET, FILM COATED ORAL DAILY
Qty: 90 TABLET | Refills: 3 | Status: SHIPPED | OUTPATIENT
Start: 2024-09-03

## 2024-09-18 RX ORDER — LOSARTAN POTASSIUM 50 MG/1
50 TABLET ORAL 2 TIMES DAILY
Qty: 180 TABLET | Refills: 3 | Status: SHIPPED | OUTPATIENT
Start: 2024-09-18

## 2024-11-12 ENCOUNTER — OFFICE VISIT (OUTPATIENT)
Age: 84
End: 2024-11-12
Payer: MEDICARE

## 2024-11-12 ENCOUNTER — TELEPHONE (OUTPATIENT)
Age: 84
End: 2024-11-12

## 2024-11-12 VITALS
OXYGEN SATURATION: 97 % | TEMPERATURE: 98.6 F | RESPIRATION RATE: 17 BRPM | HEART RATE: 70 BPM | WEIGHT: 169.8 LBS | DIASTOLIC BLOOD PRESSURE: 60 MMHG | BODY MASS INDEX: 28.99 KG/M2 | HEIGHT: 64 IN | SYSTOLIC BLOOD PRESSURE: 138 MMHG

## 2024-11-12 DIAGNOSIS — N18.32 STAGE 3B CHRONIC KIDNEY DISEASE (HCC): ICD-10-CM

## 2024-11-12 DIAGNOSIS — Z91.81 AT HIGH RISK FOR FALLS: ICD-10-CM

## 2024-11-12 DIAGNOSIS — E55.9 VITAMIN D DEFICIENCY: ICD-10-CM

## 2024-11-12 DIAGNOSIS — R53.82 CHRONIC FATIGUE: ICD-10-CM

## 2024-11-12 DIAGNOSIS — E22.2 SIADH (SYNDROME OF INAPPROPRIATE ADH PRODUCTION) (HCC): ICD-10-CM

## 2024-11-12 DIAGNOSIS — Z00.00 MEDICARE ANNUAL WELLNESS VISIT, SUBSEQUENT: Primary | ICD-10-CM

## 2024-11-12 DIAGNOSIS — R73.03 PREDIABETES: ICD-10-CM

## 2024-11-12 DIAGNOSIS — I10 PRIMARY HYPERTENSION: ICD-10-CM

## 2024-11-12 DIAGNOSIS — R45.89 ANXIETY ABOUT HEALTH: ICD-10-CM

## 2024-11-12 DIAGNOSIS — R92.8 ABNORMAL MAMMOGRAM: ICD-10-CM

## 2024-11-12 PROCEDURE — 1036F TOBACCO NON-USER: CPT | Performed by: NURSE PRACTITIONER

## 2024-11-12 PROCEDURE — G8427 DOCREV CUR MEDS BY ELIG CLIN: HCPCS | Performed by: NURSE PRACTITIONER

## 2024-11-12 PROCEDURE — 1125F AMNT PAIN NOTED PAIN PRSNT: CPT | Performed by: NURSE PRACTITIONER

## 2024-11-12 PROCEDURE — G8484 FLU IMMUNIZE NO ADMIN: HCPCS | Performed by: NURSE PRACTITIONER

## 2024-11-12 PROCEDURE — G8417 CALC BMI ABV UP PARAM F/U: HCPCS | Performed by: NURSE PRACTITIONER

## 2024-11-12 PROCEDURE — 1160F RVW MEDS BY RX/DR IN RCRD: CPT | Performed by: NURSE PRACTITIONER

## 2024-11-12 PROCEDURE — 1090F PRES/ABSN URINE INCON ASSESS: CPT | Performed by: NURSE PRACTITIONER

## 2024-11-12 PROCEDURE — 3078F DIAST BP <80 MM HG: CPT | Performed by: NURSE PRACTITIONER

## 2024-11-12 PROCEDURE — 3075F SYST BP GE 130 - 139MM HG: CPT | Performed by: NURSE PRACTITIONER

## 2024-11-12 PROCEDURE — G0439 PPPS, SUBSEQ VISIT: HCPCS | Performed by: NURSE PRACTITIONER

## 2024-11-12 PROCEDURE — 36415 COLL VENOUS BLD VENIPUNCTURE: CPT | Performed by: NURSE PRACTITIONER

## 2024-11-12 PROCEDURE — 99214 OFFICE O/P EST MOD 30 MIN: CPT | Performed by: NURSE PRACTITIONER

## 2024-11-12 PROCEDURE — 1159F MED LIST DOCD IN RCRD: CPT | Performed by: NURSE PRACTITIONER

## 2024-11-12 PROCEDURE — 1123F ACP DISCUSS/DSCN MKR DOCD: CPT | Performed by: NURSE PRACTITIONER

## 2024-11-12 PROCEDURE — G8399 PT W/DXA RESULTS DOCUMENT: HCPCS | Performed by: NURSE PRACTITIONER

## 2024-11-12 RX ORDER — NIFEDIPINE 30 MG/1
30 TABLET, EXTENDED RELEASE ORAL NIGHTLY
Qty: 90 TABLET | Refills: 3 | Status: SHIPPED | OUTPATIENT
Start: 2024-11-12

## 2024-11-12 SDOH — SOCIAL STABILITY: SOCIAL INSECURITY: WITHIN THE LAST YEAR, HAVE YOU BEEN AFRAID OF YOUR PARTNER OR EX-PARTNER?: NO

## 2024-11-12 SDOH — HEALTH STABILITY: MENTAL HEALTH
STRESS IS WHEN SOMEONE FEELS TENSE, NERVOUS, ANXIOUS, OR CAN'T SLEEP AT NIGHT BECAUSE THEIR MIND IS TROUBLED. HOW STRESSED ARE YOU?: NOT AT ALL

## 2024-11-12 SDOH — SOCIAL STABILITY: SOCIAL NETWORK: HOW OFTEN DO YOU ATTENT MEETINGS OF THE CLUB OR ORGANIZATION YOU BELONG TO?: NEVER

## 2024-11-12 SDOH — HEALTH STABILITY: PHYSICAL HEALTH: ON AVERAGE, HOW MANY MINUTES DO YOU ENGAGE IN EXERCISE AT THIS LEVEL?: 90 MIN

## 2024-11-12 SDOH — ECONOMIC STABILITY: FOOD INSECURITY: WITHIN THE PAST 12 MONTHS, YOU WORRIED THAT YOUR FOOD WOULD RUN OUT BEFORE YOU GOT MONEY TO BUY MORE.: NEVER TRUE

## 2024-11-12 SDOH — SOCIAL STABILITY: SOCIAL NETWORK
DO YOU BELONG TO ANY CLUBS OR ORGANIZATIONS SUCH AS CHURCH GROUPS UNIONS, FRATERNAL OR ATHLETIC GROUPS, OR SCHOOL GROUPS?: PATIENT UNABLE TO ANSWER

## 2024-11-12 SDOH — ECONOMIC STABILITY: FOOD INSECURITY: WITHIN THE PAST 12 MONTHS, THE FOOD YOU BOUGHT JUST DIDN'T LAST AND YOU DIDN'T HAVE MONEY TO GET MORE.: NEVER TRUE

## 2024-11-12 SDOH — HEALTH STABILITY: PHYSICAL HEALTH: ON AVERAGE, HOW MANY DAYS PER WEEK DO YOU ENGAGE IN MODERATE TO STRENUOUS EXERCISE (LIKE A BRISK WALK)?: 7 DAYS

## 2024-11-12 SDOH — SOCIAL STABILITY: SOCIAL NETWORK: HOW OFTEN DO YOU GET TOGETHER WITH FRIENDS OR RELATIVES?: MORE THAN THREE TIMES A WEEK

## 2024-11-12 SDOH — SOCIAL STABILITY: SOCIAL INSECURITY: WITHIN THE LAST YEAR, HAVE YOU BEEN HUMILIATED OR EMOTIONALLY ABUSED IN OTHER WAYS BY YOUR PARTNER OR EX-PARTNER?: NO

## 2024-11-12 SDOH — SOCIAL STABILITY: SOCIAL INSECURITY
WITHIN THE LAST YEAR, HAVE YOU BEEN KICKED, HIT, SLAPPED, OR OTHERWISE PHYSICALLY HURT BY YOUR PARTNER OR EX-PARTNER?: NO

## 2024-11-12 SDOH — ECONOMIC STABILITY: TRANSPORTATION INSECURITY
IN THE PAST 12 MONTHS, HAS THE LACK OF TRANSPORTATION KEPT YOU FROM MEDICAL APPOINTMENTS OR FROM GETTING MEDICATIONS?: NO

## 2024-11-12 SDOH — HEALTH STABILITY: MENTAL HEALTH: HOW OFTEN DO YOU HAVE A DRINK CONTAINING ALCOHOL?: NEVER

## 2024-11-12 SDOH — ECONOMIC STABILITY: INCOME INSECURITY: IN THE LAST 12 MONTHS, WAS THERE A TIME WHEN YOU WERE NOT ABLE TO PAY THE MORTGAGE OR RENT ON TIME?: NO

## 2024-11-12 SDOH — HEALTH STABILITY: MENTAL HEALTH: HOW MANY STANDARD DRINKS CONTAINING ALCOHOL DO YOU HAVE ON A TYPICAL DAY?: PATIENT DOES NOT DRINK

## 2024-11-12 SDOH — ECONOMIC STABILITY: INCOME INSECURITY: HOW HARD IS IT FOR YOU TO PAY FOR THE VERY BASICS LIKE FOOD, HOUSING, MEDICAL CARE, AND HEATING?: NOT HARD AT ALL

## 2024-11-12 SDOH — ECONOMIC STABILITY: TRANSPORTATION INSECURITY
IN THE PAST 12 MONTHS, HAS LACK OF TRANSPORTATION KEPT YOU FROM MEETINGS, WORK, OR FROM GETTING THINGS NEEDED FOR DAILY LIVING?: NO

## 2024-11-12 SDOH — SOCIAL STABILITY: SOCIAL NETWORK: ARE YOU MARRIED, WIDOWED, DIVORCED, SEPARATED, NEVER MARRIED, OR LIVING WITH A PARTNER?: WIDOWED

## 2024-11-12 SDOH — SOCIAL STABILITY: SOCIAL INSECURITY
WITHIN THE LAST YEAR, HAVE TO BEEN RAPED OR FORCED TO HAVE ANY KIND OF SEXUAL ACTIVITY BY YOUR PARTNER OR EX-PARTNER?: NO

## 2024-11-12 SDOH — SOCIAL STABILITY: SOCIAL NETWORK
IN A TYPICAL WEEK, HOW MANY TIMES DO YOU TALK ON THE PHONE WITH FAMILY, FRIENDS, OR NEIGHBORS?: MORE THAN THREE TIMES A WEEK

## 2024-11-12 SDOH — SOCIAL STABILITY: SOCIAL NETWORK: HOW OFTEN DO YOU ATTEND CHURCH OR RELIGIOUS SERVICES?: NEVER

## 2024-11-12 ASSESSMENT — PATIENT HEALTH QUESTIONNAIRE - PHQ9
2. FEELING DOWN, DEPRESSED OR HOPELESS: NOT AT ALL
SUM OF ALL RESPONSES TO PHQ9 QUESTIONS 1 & 2: 0
1. LITTLE INTEREST OR PLEASURE IN DOING THINGS: NOT AT ALL
SUM OF ALL RESPONSES TO PHQ QUESTIONS 1-9: 0

## 2024-11-12 NOTE — TELEPHONE ENCOUNTER
MERVIN Burr requesting Shingles vaccine information per Hailey.  Per Leno, done 09/16/2024, will fax over document.

## 2024-11-12 NOTE — PROGRESS NOTES
\"Have you been to the ER, urgent care clinic since your last visit?  Hospitalized since your last visit?\"    NO    “Have you seen or consulted any other health care providers outside our system since your last visit?”    NO           
losartan 50 mg BID as well as nifedipine QHS. Released from Dr. Samson's care.      Venous insufficiency - follows with vascular through [facility]. Leaky valves, stable.     New issues: In today for her check up. BP has been excellent. She [facility], asymmetry noted in the R breast and she will be called back for a diagnostic mammogram and USN. She reports some mild fatigue. She sleeps well at night but wakes up x2 to void, which is chronic for her. She occasionally takes a nap. She denies depression. She wonders if there is something she can take to boost her energy.     Allergies   Allergen Reactions    Sulfa Antibiotics Nausea Only and Other (See Comments)     Blisters On  Lip     Betamethasone      Other reaction(s): red face    Cortisone Other (See Comments)     Hot flash, flush    Etodolac      Other reaction(s): GI upset    Ciprofloxacin Hives    Lisinopril Other (See Comments)     Felt bad    Egg Shells      Allergy testing done showing sensitivity to eggs-pt eats eggs on a regular basis without any reactions       Prior to Visit Medications    Medication Sig Taking? Authorizing Provider   sertraline (ZOLOFT) 50 MG tablet Take 1 tablet by mouth daily Yes Rosalina Flores APRN - NP   NIFEdipine (PROCARDIA XL) 30 MG extended release tablet Take 1 tablet by mouth at bedtime Yes Rosalina Flores APRN - NP   losartan (COZAAR) 50 MG tablet TAKE 1 TABLET BY MOUTH IN THE  MORNING AND AT BEDTIME Yes Rosalina Flores APRN - NP   atorvastatin (LIPITOR) 40 MG tablet TAKE 1 TABLET BY MOUTH DAILY Yes Rosalina Flores APRN - NP   polyethylene glycol (GLYCOLAX) 17 GM/SCOOP powder Take 17 g by mouth daily Yes Provider, MD Greg   aspirin 81 MG EC tablet Take by mouth daily Yes Automatic Reconciliation, Ar   vitamin D 25 MCG (1000 UT) CAPS Take 2 capsules by mouth daily Yes Automatic Reconciliation, Ar   pantoprazole (PROTONIX) 40 MG tablet Take 1 tablet by mouth daily Yes

## 2024-11-12 NOTE — PATIENT INSTRUCTIONS
problems such as diabetes, high blood pressure, and high cholesterol. If you think you may have a problem with alcohol or drug use, talk to your doctor.   Medicines    Take your medicines exactly as prescribed. Call your doctor if you think you are having a problem with your medicine.     If your doctor recommends aspirin, take the amount directed each day. Make sure you take aspirin and not another kind of pain reliever, such as acetaminophen (Tylenol).   When should you call for help?   Call 911 if you have symptoms of a heart attack. These may include:    Chest pain or pressure, or a strange feeling in the chest.     Sweating.     Shortness of breath.     Pain, pressure, or a strange feeling in the back, neck, jaw, or upper belly or in one or both shoulders or arms.     Lightheadedness or sudden weakness.     A fast or irregular heartbeat.   After you call 911, the  may tell you to chew 1 adult-strength or 2 to 4 low-dose aspirin. Wait for an ambulance. Do not try to drive yourself.  Watch closely for changes in your health, and be sure to contact your doctor if you have any problems.  Where can you learn more?  Go to https://www.[employer].net/patientEd and enter F075 to learn more about \"A Healthy Heart: Care Instructions.\"  Current as of: June 24, 2023  Content Version: 14.2  © 2024 [employer].   Care instructions adapted under license by [employer]. If you have questions about a medical condition or this instruction, always ask your healthcare professional. [employer] disclaims any warranty or liability for your use of this information.      Personalized Preventive Plan for Katerin Biswas - 11/12/2024  Medicare offers a range of preventive health benefits. Some of the tests and screenings are paid in full while other may be subject to a deductible, co-insurance, and/or copay.    Some of these benefits include a comprehensive review of your medical history including

## 2024-11-13 LAB
25(OH)D3 SERPL-MCNC: 50.2 NG/ML (ref 30–100)
ALBUMIN SERPL-MCNC: 4.2 G/DL (ref 3.5–5)
ALBUMIN/GLOB SERPL: 1.3 (ref 1.1–2.2)
ALP SERPL-CCNC: 89 U/L (ref 45–117)
ALT SERPL-CCNC: 19 U/L (ref 12–78)
ANION GAP SERPL CALC-SCNC: 5 MMOL/L (ref 2–12)
AST SERPL-CCNC: 16 U/L (ref 15–37)
BASOPHILS # BLD: 0.1 K/UL (ref 0–0.1)
BASOPHILS NFR BLD: 1 % (ref 0–1)
BILIRUB SERPL-MCNC: 0.5 MG/DL (ref 0.2–1)
BUN SERPL-MCNC: 22 MG/DL (ref 6–20)
BUN/CREAT SERPL: 15 (ref 12–20)
CALCIUM SERPL-MCNC: 9.6 MG/DL (ref 8.5–10.1)
CHLORIDE SERPL-SCNC: 105 MMOL/L (ref 97–108)
CHOLEST SERPL-MCNC: 161 MG/DL
CO2 SERPL-SCNC: 25 MMOL/L (ref 21–32)
CREAT SERPL-MCNC: 1.48 MG/DL (ref 0.55–1.02)
DIFFERENTIAL METHOD BLD: ABNORMAL
EOSINOPHIL # BLD: 0.4 K/UL (ref 0–0.4)
EOSINOPHIL NFR BLD: 5 % (ref 0–7)
ERYTHROCYTE [DISTWIDTH] IN BLOOD BY AUTOMATED COUNT: 13.1 % (ref 11.5–14.5)
EST. AVERAGE GLUCOSE BLD GHB EST-MCNC: 120 MG/DL
FERRITIN SERPL-MCNC: 48 NG/ML (ref 26–388)
FOLATE SERPL-MCNC: 48.5 NG/ML (ref 5–21)
GLOBULIN SER CALC-MCNC: 3.2 G/DL (ref 2–4)
GLUCOSE SERPL-MCNC: 103 MG/DL (ref 65–100)
HBA1C MFR BLD: 5.8 % (ref 4–5.6)
HCT VFR BLD AUTO: 35.2 % (ref 35–47)
HDLC SERPL-MCNC: 48 MG/DL
HDLC SERPL: 3.4 (ref 0–5)
HGB BLD-MCNC: 11.3 G/DL (ref 11.5–16)
IMM GRANULOCYTES # BLD AUTO: 0 K/UL (ref 0–0.04)
IMM GRANULOCYTES NFR BLD AUTO: 0 % (ref 0–0.5)
IRON SATN MFR SERPL: 18 % (ref 20–50)
IRON SERPL-MCNC: 63 UG/DL (ref 35–150)
LDLC SERPL CALC-MCNC: 80.6 MG/DL (ref 0–100)
LYMPHOCYTES # BLD: 1.8 K/UL (ref 0.8–3.5)
LYMPHOCYTES NFR BLD: 26 % (ref 12–49)
MCH RBC QN AUTO: 30.3 PG (ref 26–34)
MCHC RBC AUTO-ENTMCNC: 32.1 G/DL (ref 30–36.5)
MCV RBC AUTO: 94.4 FL (ref 80–99)
MONOCYTES # BLD: 0.5 K/UL (ref 0–1)
MONOCYTES NFR BLD: 7 % (ref 5–13)
NEUTS SEG # BLD: 4.3 K/UL (ref 1.8–8)
NEUTS SEG NFR BLD: 61 % (ref 32–75)
NRBC # BLD: 0 K/UL (ref 0–0.01)
NRBC BLD-RTO: 0 PER 100 WBC
PLATELET # BLD AUTO: 251 K/UL (ref 150–400)
PMV BLD AUTO: 9.7 FL (ref 8.9–12.9)
POTASSIUM SERPL-SCNC: 4.9 MMOL/L (ref 3.5–5.1)
PROT SERPL-MCNC: 7.4 G/DL (ref 6.4–8.2)
RBC # BLD AUTO: 3.73 M/UL (ref 3.8–5.2)
SODIUM SERPL-SCNC: 135 MMOL/L (ref 136–145)
T4 FREE SERPL-MCNC: 0.9 NG/DL (ref 0.8–1.5)
TIBC SERPL-MCNC: 359 UG/DL (ref 250–450)
TRIGL SERPL-MCNC: 162 MG/DL
TSH SERPL DL<=0.05 MIU/L-ACNC: 2.98 UIU/ML (ref 0.36–3.74)
VIT B12 SERPL-MCNC: 282 PG/ML (ref 193–986)
VLDLC SERPL CALC-MCNC: 32.4 MG/DL
WBC # BLD AUTO: 6.9 K/UL (ref 3.6–11)

## 2024-11-18 DIAGNOSIS — R92.8 ABNORMAL MAMMOGRAM: ICD-10-CM

## 2025-01-10 ENCOUNTER — OFFICE VISIT (OUTPATIENT)
Age: 85
End: 2025-01-10

## 2025-01-10 VITALS
HEART RATE: 82 BPM | OXYGEN SATURATION: 96 % | TEMPERATURE: 97.6 F | SYSTOLIC BLOOD PRESSURE: 140 MMHG | HEIGHT: 64 IN | DIASTOLIC BLOOD PRESSURE: 76 MMHG | WEIGHT: 173.2 LBS | BODY MASS INDEX: 29.57 KG/M2 | RESPIRATION RATE: 16 BRPM

## 2025-01-10 DIAGNOSIS — E53.8 VITAMIN B12 DEFICIENCY: ICD-10-CM

## 2025-01-10 DIAGNOSIS — I10 PRIMARY HYPERTENSION: Primary | ICD-10-CM

## 2025-01-10 DIAGNOSIS — N18.32 STAGE 3B CHRONIC KIDNEY DISEASE (HCC): ICD-10-CM

## 2025-01-10 DIAGNOSIS — D50.9 IRON DEFICIENCY ANEMIA, UNSPECIFIED IRON DEFICIENCY ANEMIA TYPE: ICD-10-CM

## 2025-01-10 ASSESSMENT — PATIENT HEALTH QUESTIONNAIRE - PHQ9
SUM OF ALL RESPONSES TO PHQ QUESTIONS 1-9: 0
SUM OF ALL RESPONSES TO PHQ9 QUESTIONS 1 & 2: 0
2. FEELING DOWN, DEPRESSED OR HOPELESS: NOT AT ALL
1. LITTLE INTEREST OR PLEASURE IN DOING THINGS: NOT AT ALL

## 2025-01-10 NOTE — PROGRESS NOTES
Chief Complaint   Patient presents with    Hypertension       ASSESSMENT AND PLAN:      Diagnosis Orders   1. Primary hypertension  CBC with Auto Differential    Comprehensive Metabolic Panel    Vitamin B12    Iron and TIBC    Ferritin    COLLECTION VENOUS BLOOD,VENIPUNCTURE    Ferritin    Iron and TIBC    Vitamin B12    Comprehensive Metabolic Panel    CBC with Auto Differential      2. Stage 3b chronic kidney disease (HCC)        3. Vitamin B12 deficiency  Vitamin B12    COLLECTION VENOUS BLOOD,VENIPUNCTURE    Vitamin B12      4. Iron deficiency anemia, unspecified iron deficiency anemia type  CBC with Auto Differential    Iron and TIBC    Ferritin    COLLECTION VENOUS BLOOD,VENIPUNCTURE    Ferritin    Iron and TIBC    CBC with Auto Differential          Repeat labs as above. May adjust dosing based on results. No changes to BP regimen despite borderline BP today. Her BP is very fickle and I hesitate to change a regimen that has been working well in the past.     Patient aware of plan of care and verbalized understanding. Questions answered. RTC pending results, or sooner if needed.    HPI:     is a 84 y.o. female in today for follow up.    Anxiety/depression - On sertraline, taking it daily. Well controlled.     Hyponatremia - Brief hyponatremia in 2019, resolved with med adjustments, sodium tablets. Recurred Feb 2024 after starting thiazide diuretic, no longer taking. Follows with Dr. Lowery, CKD stage IIIb.     Hypertension: Remains on losartan 50 mg BID as well as nifedipine QHS. Released from Dr. Samson's care.      Venous insufficiency - follows with vascular through [facility]. Leaky valves, stable.     New issues: In for a 2 month follow up. She presented at that time for her check up with a CC of fatigue. Her B12 was low in November and she's started taking supplements. Her iron levels were also low. She is in for a re-check. Her energy has improved. BP at home runs typically in the 120 systolic.

## 2025-01-11 LAB
ALBUMIN SERPL-MCNC: 4.3 G/DL (ref 3.5–5)
ALBUMIN/GLOB SERPL: 1.5 (ref 1.1–2.2)
ALP SERPL-CCNC: 84 U/L (ref 45–117)
ALT SERPL-CCNC: 17 U/L (ref 12–78)
ANION GAP SERPL CALC-SCNC: 7 MMOL/L (ref 2–12)
AST SERPL-CCNC: 16 U/L (ref 15–37)
BASOPHILS # BLD: 0.06 K/UL (ref 0–0.1)
BASOPHILS NFR BLD: 0.8 % (ref 0–1)
BILIRUB SERPL-MCNC: 0.4 MG/DL (ref 0.2–1)
BUN SERPL-MCNC: 17 MG/DL (ref 6–20)
BUN/CREAT SERPL: 13 (ref 12–20)
CALCIUM SERPL-MCNC: 9.6 MG/DL (ref 8.5–10.1)
CHLORIDE SERPL-SCNC: 100 MMOL/L (ref 97–108)
CO2 SERPL-SCNC: 25 MMOL/L (ref 21–32)
CREAT SERPL-MCNC: 1.35 MG/DL (ref 0.55–1.02)
DIFFERENTIAL METHOD BLD: ABNORMAL
EOSINOPHIL # BLD: 0.31 K/UL (ref 0–0.4)
EOSINOPHIL NFR BLD: 4 % (ref 0–7)
ERYTHROCYTE [DISTWIDTH] IN BLOOD BY AUTOMATED COUNT: 12.8 % (ref 11.5–14.5)
FERRITIN SERPL-MCNC: 56 NG/ML (ref 26–388)
GLOBULIN SER CALC-MCNC: 2.9 G/DL (ref 2–4)
GLUCOSE SERPL-MCNC: 90 MG/DL (ref 65–100)
HCT VFR BLD AUTO: 35.3 % (ref 35–47)
HGB BLD-MCNC: 11.4 G/DL (ref 11.5–16)
IMM GRANULOCYTES # BLD AUTO: 0.02 K/UL (ref 0–0.04)
IMM GRANULOCYTES NFR BLD AUTO: 0.3 % (ref 0–0.5)
IRON SATN MFR SERPL: 42 % (ref 20–50)
IRON SERPL-MCNC: 141 UG/DL (ref 35–150)
LYMPHOCYTES # BLD: 1.76 K/UL (ref 0.8–3.5)
LYMPHOCYTES NFR BLD: 22.7 % (ref 12–49)
MCH RBC QN AUTO: 30.2 PG (ref 26–34)
MCHC RBC AUTO-ENTMCNC: 32.3 G/DL (ref 30–36.5)
MCV RBC AUTO: 93.6 FL (ref 80–99)
MONOCYTES # BLD: 0.67 K/UL (ref 0–1)
MONOCYTES NFR BLD: 8.7 % (ref 5–13)
NEUTS SEG # BLD: 4.92 K/UL (ref 1.8–8)
NEUTS SEG NFR BLD: 63.5 % (ref 32–75)
NRBC # BLD: 0 K/UL (ref 0–0.01)
NRBC BLD-RTO: 0 PER 100 WBC
PLATELET # BLD AUTO: 249 K/UL (ref 150–400)
PMV BLD AUTO: 9.6 FL (ref 8.9–12.9)
POTASSIUM SERPL-SCNC: 5 MMOL/L (ref 3.5–5.1)
PROT SERPL-MCNC: 7.2 G/DL (ref 6.4–8.2)
RBC # BLD AUTO: 3.77 M/UL (ref 3.8–5.2)
SODIUM SERPL-SCNC: 132 MMOL/L (ref 136–145)
TIBC SERPL-MCNC: 338 UG/DL (ref 250–450)
VIT B12 SERPL-MCNC: 698 PG/ML (ref 193–986)
WBC # BLD AUTO: 7.7 K/UL (ref 3.6–11)

## 2025-06-13 ENCOUNTER — OFFICE VISIT (OUTPATIENT)
Age: 85
End: 2025-06-13

## 2025-06-13 VITALS
WEIGHT: 171.6 LBS | OXYGEN SATURATION: 97 % | HEIGHT: 64 IN | TEMPERATURE: 97.6 F | SYSTOLIC BLOOD PRESSURE: 160 MMHG | DIASTOLIC BLOOD PRESSURE: 70 MMHG | RESPIRATION RATE: 17 BRPM | HEART RATE: 68 BPM | BODY MASS INDEX: 29.3 KG/M2

## 2025-06-13 DIAGNOSIS — E55.9 VITAMIN D DEFICIENCY: ICD-10-CM

## 2025-06-13 DIAGNOSIS — N18.32 STAGE 3B CHRONIC KIDNEY DISEASE (HCC): ICD-10-CM

## 2025-06-13 DIAGNOSIS — Z23 IMMUNIZATION DUE: ICD-10-CM

## 2025-06-13 DIAGNOSIS — E22.2 SIADH (SYNDROME OF INAPPROPRIATE ADH PRODUCTION): ICD-10-CM

## 2025-06-13 DIAGNOSIS — I10 PRIMARY HYPERTENSION: ICD-10-CM

## 2025-06-13 DIAGNOSIS — R73.03 PREDIABETES: ICD-10-CM

## 2025-06-13 DIAGNOSIS — E53.8 B12 DEFICIENCY: ICD-10-CM

## 2025-06-13 DIAGNOSIS — R10.9 RIGHT FLANK PAIN: Primary | ICD-10-CM

## 2025-06-13 SDOH — SOCIAL STABILITY: SOCIAL INSECURITY
WITHIN THE LAST YEAR, HAVE YOU BEEN RAPED OR FORCED TO HAVE ANY KIND OF SEXUAL ACTIVITY BY YOUR PARTNER OR EX-PARTNER?: NO

## 2025-06-13 SDOH — HEALTH STABILITY: MENTAL HEALTH: HOW OFTEN DO YOU HAVE A DRINK CONTAINING ALCOHOL?: NEVER

## 2025-06-13 SDOH — HEALTH STABILITY: PHYSICAL HEALTH: ON AVERAGE, HOW MANY MINUTES DO YOU ENGAGE IN EXERCISE AT THIS LEVEL?: 150+ MIN

## 2025-06-13 SDOH — ECONOMIC STABILITY: FOOD INSECURITY: HOW HARD IS IT FOR YOU TO PAY FOR THE VERY BASICS LIKE FOOD, HOUSING, MEDICAL CARE, AND HEATING?: NOT HARD AT ALL

## 2025-06-13 SDOH — SOCIAL STABILITY: SOCIAL INSECURITY: ARE YOU MARRIED, WIDOWED, DIVORCED, SEPARATED, NEVER MARRIED, OR LIVING WITH A PARTNER?: WIDOWED

## 2025-06-13 SDOH — HEALTH STABILITY: MENTAL HEALTH
DO YOU FEEL STRESS - TENSE, RESTLESS, NERVOUS, OR ANXIOUS, OR UNABLE TO SLEEP AT NIGHT BECAUSE YOUR MIND IS TROUBLED ALL THE TIME - THESE DAYS?: NOT AT ALL

## 2025-06-13 SDOH — ECONOMIC STABILITY: FOOD INSECURITY: WITHIN THE PAST 12 MONTHS, THE FOOD YOU BOUGHT JUST DIDN'T LAST AND YOU DIDN'T HAVE MONEY TO GET MORE.: NEVER TRUE

## 2025-06-13 SDOH — HEALTH STABILITY: PHYSICAL HEALTH: ON AVERAGE, HOW MANY DAYS PER WEEK DO YOU ENGAGE IN MODERATE TO STRENUOUS EXERCISE (LIKE A BRISK WALK)?: 7 DAYS

## 2025-06-13 SDOH — SOCIAL STABILITY: SOCIAL NETWORK
DO YOU BELONG TO ANY CLUBS OR ORGANIZATIONS SUCH AS CHURCH GROUPS, UNIONS, FRATERNAL OR ATHLETIC GROUPS, OR SCHOOL GROUPS?: NO

## 2025-06-13 SDOH — ECONOMIC STABILITY: FOOD INSECURITY: WITHIN THE PAST 12 MONTHS, YOU WORRIED THAT YOUR FOOD WOULD RUN OUT BEFORE YOU GOT THE MONEY TO BUY MORE.: NEVER TRUE

## 2025-06-13 SDOH — SOCIAL STABILITY: SOCIAL NETWORK: HOW OFTEN DO YOU GET TOGETHER WITH FRIENDS OR RELATIVES?: MORE THAN THREE TIMES A WEEK

## 2025-06-13 SDOH — SOCIAL STABILITY: SOCIAL INSECURITY: WITHIN THE LAST YEAR, HAVE YOU BEEN HUMILIATED OR EMOTIONALLY ABUSED IN OTHER WAYS BY YOUR PARTNER OR EX-PARTNER?: NO

## 2025-06-13 SDOH — SOCIAL STABILITY: SOCIAL NETWORK: HOW OFTEN DO YOU ATTEND CHURCH OR RELIGIOUS SERVICES?: NEVER

## 2025-06-13 SDOH — SOCIAL STABILITY: SOCIAL INSECURITY: WITHIN THE LAST YEAR, HAVE YOU BEEN AFRAID OF YOUR PARTNER OR EX-PARTNER?: NO

## 2025-06-13 SDOH — ECONOMIC STABILITY: HOUSING INSECURITY: IN THE LAST 12 MONTHS, WAS THERE A TIME WHEN YOU WERE NOT ABLE TO PAY THE MORTGAGE OR RENT ON TIME?: NO

## 2025-06-13 SDOH — ECONOMIC STABILITY: TRANSPORTATION INSECURITY: IN THE PAST 12 MONTHS, HAS LACK OF TRANSPORTATION KEPT YOU FROM MEDICAL APPOINTMENTS OR FROM GETTING MEDICATIONS?: NO

## 2025-06-13 SDOH — SOCIAL STABILITY: SOCIAL NETWORK: HOW OFTEN DO YOU ATTEND MEETINGS OF THE CLUBS OR ORGANIZATIONS YOU BELONG TO?: NEVER

## 2025-06-13 SDOH — HEALTH STABILITY: MENTAL HEALTH: HOW MANY DRINKS CONTAINING ALCOHOL DO YOU HAVE ON A TYPICAL DAY WHEN YOU ARE DRINKING?: PATIENT DOES NOT DRINK

## 2025-06-13 ASSESSMENT — PATIENT HEALTH QUESTIONNAIRE - PHQ9
SUM OF ALL RESPONSES TO PHQ QUESTIONS 1-9: 0
2. FEELING DOWN, DEPRESSED OR HOPELESS: NOT AT ALL
1. LITTLE INTEREST OR PLEASURE IN DOING THINGS: NOT AT ALL

## 2025-06-13 ASSESSMENT — ACTIVITIES OF DAILY LIVING (ADL): LACK_OF_TRANSPORTATION: NO

## 2025-06-13 NOTE — PROGRESS NOTES
Chief Complaint   Patient presents with    Hypertension       ASSESSMENT AND PLAN:       1. Right flank pain    - COLLECTION VENOUS BLOOD,VENIPUNCTURE  - CT ABDOMEN PELVIS WO CONTRAST Additional Contrast? None; Future    2. Immunization due    - Tdap, BOOSTRIX, (age 10 yrs+), IM  - COLLECTION VENOUS BLOOD,VENIPUNCTURE    3. SIADH (syndrome of inappropriate ADH production)    - COLLECTION VENOUS BLOOD,VENIPUNCTURE    4. Primary hypertension    - COLLECTION VENOUS BLOOD,VENIPUNCTURE  - CBC with Auto Differential; Future  - Comprehensive Metabolic Panel; Future  - Lipid Panel; Future  - TSH; Future  - TSH  - Lipid Panel  - Comprehensive Metabolic Panel  - CBC with Auto Differential    5. Stage 3b chronic kidney disease (HCC)    - COLLECTION VENOUS BLOOD,VENIPUNCTURE    6. Prediabetes    - COLLECTION VENOUS BLOOD,VENIPUNCTURE  - Hemoglobin A1C; Future  - Hemoglobin A1C    7. Vitamin D deficiency    - Vitamin D 25 Hydroxy; Future  - Vitamin D 25 Hydroxy    8. B12 deficiency    - Vitamin B12 & Folate; Future  - Vitamin B12 & Folate       Assessment & Plan  1. Right-sided pain: Chronic.  - Order CT abdomen to rule out kidney stones or liver conditions.  - If clear, consider physical therapy for muscular issues.  - Continue heat and Tylenol for pain.    2. Health maintenance.  - Administer tetanus booster today.  - Completed shingles vaccination series; update records.  - Fully immunized against pneumonia; no booster needed.  - Conduct comprehensive blood panel today; forward results to nephrologist.    3. Hypertension.  - BP elevated at 160/70 today.  - Typically well-controlled at home.  - Continue regular BP monitoring.    Follow-up  - Scheduled in 6 months.      Patient aware of plan of care and verbalized understanding. Questions answered. RTC 6 months, or sooner if needed.    HPI:      Anxiety/depression - On sertraline, taking it daily. Well controlled.     Hyponatremia - Brief hyponatremia in 2019, resolved with med

## 2025-06-14 LAB
25(OH)D3 SERPL-MCNC: 44.2 NG/ML (ref 30–100)
ALBUMIN SERPL-MCNC: 4.2 G/DL (ref 3.5–5)
ALBUMIN/GLOB SERPL: 1.2 (ref 1.1–2.2)
ALP SERPL-CCNC: 84 U/L (ref 45–117)
ALT SERPL-CCNC: 20 U/L (ref 12–78)
ANION GAP SERPL CALC-SCNC: 10 MMOL/L (ref 2–12)
AST SERPL-CCNC: 19 U/L (ref 15–37)
BASOPHILS # BLD: 0.06 K/UL (ref 0–0.1)
BASOPHILS NFR BLD: 0.9 % (ref 0–1)
BILIRUB SERPL-MCNC: 0.7 MG/DL (ref 0.2–1)
BUN SERPL-MCNC: 21 MG/DL (ref 6–20)
BUN/CREAT SERPL: 13 (ref 12–20)
CALCIUM SERPL-MCNC: 9.8 MG/DL (ref 8.5–10.1)
CHLORIDE SERPL-SCNC: 100 MMOL/L (ref 97–108)
CHOLEST SERPL-MCNC: 151 MG/DL
CO2 SERPL-SCNC: 21 MMOL/L (ref 21–32)
CREAT SERPL-MCNC: 1.56 MG/DL (ref 0.55–1.02)
DIFFERENTIAL METHOD BLD: ABNORMAL
EOSINOPHIL # BLD: 0.32 K/UL (ref 0–0.4)
EOSINOPHIL NFR BLD: 4.5 % (ref 0–7)
ERYTHROCYTE [DISTWIDTH] IN BLOOD BY AUTOMATED COUNT: 13.4 % (ref 11.5–14.5)
EST. AVERAGE GLUCOSE BLD GHB EST-MCNC: 123 MG/DL
FOLATE SERPL-MCNC: 45.9 NG/ML (ref 5–21)
GLOBULIN SER CALC-MCNC: 3.4 G/DL (ref 2–4)
GLUCOSE SERPL-MCNC: 103 MG/DL (ref 65–100)
HBA1C MFR BLD: 5.9 % (ref 4–5.6)
HCT VFR BLD AUTO: 35.3 % (ref 35–47)
HDLC SERPL-MCNC: 46 MG/DL
HDLC SERPL: 3.3 (ref 0–5)
HGB BLD-MCNC: 11.2 G/DL (ref 11.5–16)
IMM GRANULOCYTES # BLD AUTO: 0.02 K/UL (ref 0–0.04)
IMM GRANULOCYTES NFR BLD AUTO: 0.3 % (ref 0–0.5)
LDLC SERPL CALC-MCNC: 69 MG/DL (ref 0–100)
LYMPHOCYTES # BLD: 1.8 K/UL (ref 0.8–3.5)
LYMPHOCYTES NFR BLD: 25.5 % (ref 12–49)
MCH RBC QN AUTO: 29.6 PG (ref 26–34)
MCHC RBC AUTO-ENTMCNC: 31.7 G/DL (ref 30–36.5)
MCV RBC AUTO: 93.4 FL (ref 80–99)
MONOCYTES # BLD: 0.48 K/UL (ref 0–1)
MONOCYTES NFR BLD: 6.8 % (ref 5–13)
NEUTS SEG # BLD: 4.37 K/UL (ref 1.8–8)
NEUTS SEG NFR BLD: 62 % (ref 32–75)
NRBC # BLD: 0 K/UL (ref 0–0.01)
NRBC BLD-RTO: 0 PER 100 WBC
PLATELET # BLD AUTO: 259 K/UL (ref 150–400)
PMV BLD AUTO: 9.4 FL (ref 8.9–12.9)
POTASSIUM SERPL-SCNC: 5.3 MMOL/L (ref 3.5–5.1)
PROT SERPL-MCNC: 7.6 G/DL (ref 6.4–8.2)
RBC # BLD AUTO: 3.78 M/UL (ref 3.8–5.2)
SODIUM SERPL-SCNC: 131 MMOL/L (ref 136–145)
TRIGL SERPL-MCNC: 180 MG/DL
TSH SERPL DL<=0.05 MIU/L-ACNC: 2.53 UIU/ML (ref 0.36–3.74)
VIT B12 SERPL-MCNC: 1210 PG/ML (ref 193–986)
VLDLC SERPL CALC-MCNC: 36 MG/DL
WBC # BLD AUTO: 7.1 K/UL (ref 3.6–11)

## 2025-06-16 ENCOUNTER — RESULTS FOLLOW-UP (OUTPATIENT)
Age: 85
End: 2025-06-16

## 2025-06-20 ENCOUNTER — HOSPITAL ENCOUNTER (OUTPATIENT)
Facility: HOSPITAL | Age: 85
Discharge: HOME | End: 2025-06-23
Payer: MEDICARE

## 2025-06-20 DIAGNOSIS — R10.9 RIGHT FLANK PAIN: ICD-10-CM

## 2025-06-20 PROCEDURE — 74176 CT ABD & PELVIS W/O CONTRAST: CPT

## 2025-07-24 RX ORDER — ATORVASTATIN CALCIUM 40 MG/1
TABLET, FILM COATED ORAL DAILY
Qty: 90 TABLET | Refills: 3 | Status: SHIPPED | OUTPATIENT
Start: 2025-07-24

## 2025-07-31 RX ORDER — LOSARTAN POTASSIUM 50 MG/1
50 TABLET ORAL 2 TIMES DAILY
Qty: 180 TABLET | Refills: 3 | Status: SHIPPED | OUTPATIENT
Start: 2025-07-31

## 2025-09-03 DIAGNOSIS — R45.89 ANXIETY ABOUT HEALTH: ICD-10-CM
